# Patient Record
Sex: MALE | Race: WHITE | NOT HISPANIC OR LATINO | Employment: OTHER | URBAN - NONMETROPOLITAN AREA
[De-identification: names, ages, dates, MRNs, and addresses within clinical notes are randomized per-mention and may not be internally consistent; named-entity substitution may affect disease eponyms.]

---

## 2018-02-03 ENCOUNTER — HOSPITAL ENCOUNTER (EMERGENCY)
Facility: HOSPITAL | Age: 68
Discharge: HOME | End: 2018-02-03
Attending: EMERGENCY MEDICINE | Admitting: EMERGENCY MEDICINE

## 2018-02-03 VITALS
OXYGEN SATURATION: 98 % | WEIGHT: 160 LBS | DIASTOLIC BLOOD PRESSURE: 86 MMHG | HEIGHT: 67 IN | TEMPERATURE: 98.9 F | HEART RATE: 68 BPM | RESPIRATION RATE: 18 BRPM | BODY MASS INDEX: 25.11 KG/M2 | SYSTOLIC BLOOD PRESSURE: 127 MMHG

## 2018-02-03 DIAGNOSIS — F10.20 ALCOHOLISM (HCC): Primary | ICD-10-CM

## 2018-02-03 LAB
ALBUMIN SERPL-MCNC: 4 G/DL (ref 3.4–4.8)
ALBUMIN/GLOB SERPL: 1.5 G/DL (ref 1.5–2.5)
ALP SERPL-CCNC: 105 U/L (ref 40–129)
ALT SERPL W P-5'-P-CCNC: 87 U/L (ref 10–44)
ANION GAP SERPL CALCULATED.3IONS-SCNC: 12.9 MMOL/L (ref 3.6–11.2)
AST SERPL-CCNC: 142 U/L (ref 10–34)
BACTERIA UR QL AUTO: ABNORMAL /HPF
BASOPHILS # BLD AUTO: 0.03 10*3/MM3 (ref 0–0.3)
BASOPHILS NFR BLD AUTO: 0.5 % (ref 0–2)
BILIRUB SERPL-MCNC: 1.4 MG/DL (ref 0.2–1.8)
BILIRUB UR QL STRIP: ABNORMAL
BUN BLD-MCNC: 12 MG/DL (ref 7–21)
BUN/CREAT SERPL: 15.8 (ref 7–25)
CALCIUM SPEC-SCNC: 8.9 MG/DL (ref 7.7–10)
CHLORIDE SERPL-SCNC: 97 MMOL/L (ref 99–112)
CLARITY UR: ABNORMAL
CO2 SERPL-SCNC: 26.1 MMOL/L (ref 24.3–31.9)
COLOR UR: ABNORMAL
CREAT BLD-MCNC: 0.76 MG/DL (ref 0.43–1.29)
D-LACTATE SERPL-SCNC: 3 MMOL/L (ref 0.5–2)
DEPRECATED RDW RBC AUTO: 48.2 FL (ref 37–54)
EOSINOPHIL # BLD AUTO: 0.03 10*3/MM3 (ref 0–0.7)
EOSINOPHIL NFR BLD AUTO: 0.5 % (ref 0–7)
ERYTHROCYTE [DISTWIDTH] IN BLOOD BY AUTOMATED COUNT: 14.9 % (ref 11.5–14.5)
ETHANOL BLD-MCNC: 118 MG/DL
ETHANOL UR QL: 0.12 %
FLUAV AG NPH QL: NEGATIVE
FLUBV AG NPH QL IA: NEGATIVE
GFR SERPL CREATININE-BSD FRML MDRD: 102 ML/MIN/1.73
GLOBULIN UR ELPH-MCNC: 2.6 GM/DL
GLUCOSE BLD-MCNC: 104 MG/DL (ref 70–110)
GLUCOSE UR STRIP-MCNC: NEGATIVE MG/DL
HCT VFR BLD AUTO: 44.9 % (ref 42–52)
HGB BLD-MCNC: 15.7 G/DL (ref 14–18)
HGB UR QL STRIP.AUTO: ABNORMAL
HOLD SPECIMEN: NORMAL
HYALINE CASTS UR QL AUTO: ABNORMAL /LPF
IMM GRANULOCYTES # BLD: 0.01 10*3/MM3 (ref 0–0.03)
IMM GRANULOCYTES NFR BLD: 0.2 % (ref 0–0.5)
KETONES UR QL STRIP: ABNORMAL
LEUKOCYTE ESTERASE UR QL STRIP.AUTO: ABNORMAL
LYMPHOCYTES # BLD AUTO: 1.39 10*3/MM3 (ref 1–3)
LYMPHOCYTES NFR BLD AUTO: 25.3 % (ref 16–46)
MAGNESIUM SERPL-MCNC: 1.6 MG/DL (ref 1.7–2.6)
MCH RBC QN AUTO: 31.8 PG (ref 27–33)
MCHC RBC AUTO-ENTMCNC: 35 G/DL (ref 33–37)
MCV RBC AUTO: 90.9 FL (ref 80–94)
MONOCYTES # BLD AUTO: 0.7 10*3/MM3 (ref 0.1–0.9)
MONOCYTES NFR BLD AUTO: 12.8 % (ref 0–12)
NEUTROPHILS # BLD AUTO: 3.33 10*3/MM3 (ref 1.4–6.5)
NEUTROPHILS NFR BLD AUTO: 60.7 % (ref 40–75)
NITRITE UR QL STRIP: NEGATIVE
OSMOLALITY SERPL CALC.SUM OF ELEC: 272 MOSM/KG (ref 273–305)
PH UR STRIP.AUTO: 5.5 [PH] (ref 5–8)
PLATELET # BLD AUTO: 35 10*3/MM3 (ref 130–400)
PMV BLD AUTO: 13.2 FL (ref 6–10)
POTASSIUM BLD-SCNC: 3.5 MMOL/L (ref 3.5–5.3)
PROT SERPL-MCNC: 6.6 G/DL (ref 6–8)
PROT UR QL STRIP: ABNORMAL
RBC # BLD AUTO: 4.94 10*6/MM3 (ref 4.7–6.1)
RBC # UR: ABNORMAL /HPF
RBC MORPH BLD: NORMAL
REF LAB TEST METHOD: ABNORMAL
SMALL PLATELETS BLD QL SMEAR: NORMAL
SODIUM BLD-SCNC: 136 MMOL/L (ref 135–153)
SP GR UR STRIP: 1.02 (ref 1–1.03)
SQUAMOUS #/AREA URNS HPF: ABNORMAL /HPF
UROBILINOGEN UR QL STRIP: ABNORMAL
WBC NRBC COR # BLD: 5.49 10*3/MM3 (ref 4.5–12.5)
WBC UR QL AUTO: ABNORMAL /HPF

## 2018-02-03 PROCEDURE — 83735 ASSAY OF MAGNESIUM: CPT | Performed by: EMERGENCY MEDICINE

## 2018-02-03 PROCEDURE — 80053 COMPREHEN METABOLIC PANEL: CPT | Performed by: EMERGENCY MEDICINE

## 2018-02-03 PROCEDURE — 85025 COMPLETE CBC W/AUTO DIFF WBC: CPT | Performed by: EMERGENCY MEDICINE

## 2018-02-03 PROCEDURE — 99284 EMERGENCY DEPT VISIT MOD MDM: CPT

## 2018-02-03 PROCEDURE — 87804 INFLUENZA ASSAY W/OPTIC: CPT | Performed by: EMERGENCY MEDICINE

## 2018-02-03 PROCEDURE — 80307 DRUG TEST PRSMV CHEM ANLYZR: CPT | Performed by: EMERGENCY MEDICINE

## 2018-02-03 PROCEDURE — 96365 THER/PROPH/DIAG IV INF INIT: CPT

## 2018-02-03 PROCEDURE — 25010000002 THIAMINE PER 100 MG: Performed by: EMERGENCY MEDICINE

## 2018-02-03 PROCEDURE — 87040 BLOOD CULTURE FOR BACTERIA: CPT | Performed by: EMERGENCY MEDICINE

## 2018-02-03 PROCEDURE — 85007 BL SMEAR W/DIFF WBC COUNT: CPT | Performed by: EMERGENCY MEDICINE

## 2018-02-03 PROCEDURE — 83605 ASSAY OF LACTIC ACID: CPT | Performed by: EMERGENCY MEDICINE

## 2018-02-03 PROCEDURE — 87147 CULTURE TYPE IMMUNOLOGIC: CPT | Performed by: EMERGENCY MEDICINE

## 2018-02-03 PROCEDURE — 87077 CULTURE AEROBIC IDENTIFY: CPT | Performed by: EMERGENCY MEDICINE

## 2018-02-03 PROCEDURE — 25010000002 MAGNESIUM SULFATE PER 500 MG OF MAGNESIUM: Performed by: EMERGENCY MEDICINE

## 2018-02-03 PROCEDURE — 87186 SC STD MICRODIL/AGAR DIL: CPT | Performed by: EMERGENCY MEDICINE

## 2018-02-03 PROCEDURE — 81001 URINALYSIS AUTO W/SCOPE: CPT | Performed by: EMERGENCY MEDICINE

## 2018-02-03 RX ORDER — POTASSIUM CHLORIDE 20 MEQ/1
40 TABLET, EXTENDED RELEASE ORAL ONCE
Status: COMPLETED | OUTPATIENT
Start: 2018-02-03 | End: 2018-02-03

## 2018-02-03 RX ORDER — SODIUM CHLORIDE 0.9 % (FLUSH) 0.9 %
10 SYRINGE (ML) INJECTION AS NEEDED
Status: DISCONTINUED | OUTPATIENT
Start: 2018-02-03 | End: 2018-02-03 | Stop reason: HOSPADM

## 2018-02-03 RX ORDER — HYDROXYZINE HYDROCHLORIDE 25 MG/1
25 TABLET, FILM COATED ORAL ONCE
Status: COMPLETED | OUTPATIENT
Start: 2018-02-03 | End: 2018-02-03

## 2018-02-03 RX ADMIN — POTASSIUM CHLORIDE 40 MEQ: 1500 TABLET, EXTENDED RELEASE ORAL at 17:03

## 2018-02-03 RX ADMIN — HYDROXYZINE 25 MG: 25 TABLET, FILM COATED ORAL at 17:55

## 2018-02-03 RX ADMIN — THIAMINE HYDROCHLORIDE 1000 ML/HR: 100 INJECTION, SOLUTION INTRAMUSCULAR; INTRAVENOUS at 17:31

## 2018-02-03 NOTE — ED NOTES
Pt advised of the need for urine, given urinal and instructed to push call light when finished.      Temi Walker  02/03/18 1535

## 2018-02-03 NOTE — ED PROVIDER NOTES
Subjective   Patient is a 67 y.o. male presenting with flu symptoms.   Flu Symptoms   Presenting symptoms: fatigue and myalgias    Presenting symptoms: no fever    Severity:  Mild  Onset quality:  Gradual  Duration:  2 days  Chronicity:  New  Relieved by:  Nothing  Worsened by:  Movement  Ineffective treatments:  Certain positions  Associated symptoms: chills and nasal congestion        Review of Systems   Constitutional: Positive for chills and fatigue. Negative for fever.   HENT: Positive for congestion.    Respiratory: Negative.    Cardiovascular: Negative.  Negative for chest pain.   Gastrointestinal: Negative.  Negative for abdominal pain.   Endocrine: Negative.    Genitourinary: Negative.  Negative for dysuria.   Musculoskeletal: Positive for myalgias.   Skin: Negative.    Neurological: Negative.    Psychiatric/Behavioral: Negative.    All other systems reviewed and are negative.      History reviewed. No pertinent past medical history.    No Known Allergies    Past Surgical History:   Procedure Laterality Date   • BACK SURGERY         History reviewed. No pertinent family history.    Social History     Social History   • Marital status:      Spouse name: N/A   • Number of children: N/A   • Years of education: N/A     Social History Main Topics   • Smoking status: Current Every Day Smoker     Packs/day: 1.50   • Smokeless tobacco: None   • Alcohol use Yes   • Drug use: No   • Sexual activity: Defer     Other Topics Concern   • None     Social History Narrative   • None           Objective   Physical Exam   Constitutional: He is oriented to person, place, and time. He appears well-developed and well-nourished. No distress.   HENT:   Head: Normocephalic and atraumatic.   Right Ear: External ear normal.   Left Ear: External ear normal.   Nose: Nose normal.   Eyes: Conjunctivae and EOM are normal. Pupils are equal, round, and reactive to light.   Neck: Normal range of motion. Neck supple. No JVD present. No  tracheal deviation present.   Cardiovascular: Normal rate, regular rhythm and normal heart sounds.    No murmur heard.  Pulmonary/Chest: Effort normal and breath sounds normal. No respiratory distress. He has no wheezes.   Abdominal: Soft. Bowel sounds are normal. There is no tenderness.   Musculoskeletal: Normal range of motion. He exhibits no edema or deformity.   Neurological: He is alert and oriented to person, place, and time. No cranial nerve deficit.   Skin: Skin is warm and dry. No rash noted. He is not diaphoretic. No erythema. No pallor.   Psychiatric: He has a normal mood and affect. His behavior is normal. Thought content normal.   Nursing note and vitals reviewed.      Procedures         ED Course  ED Course                  MDM    Final diagnoses:   Alcoholism            Bang Castellon MD  02/03/18 8711

## 2018-02-09 LAB
BACTERIA SPEC AEROBE CULT: ABNORMAL
BACTERIA SPEC AEROBE CULT: ABNORMAL
GRAM STN SPEC: ABNORMAL
ISOLATED FROM: ABNORMAL

## 2019-05-06 ENCOUNTER — APPOINTMENT (OUTPATIENT)
Dept: GENERAL RADIOLOGY | Facility: HOSPITAL | Age: 69
End: 2019-05-06

## 2019-05-06 ENCOUNTER — HOSPITAL ENCOUNTER (INPATIENT)
Facility: HOSPITAL | Age: 69
LOS: 4 days | Discharge: HOME | End: 2019-05-10
Attending: EMERGENCY MEDICINE | Admitting: INTERNAL MEDICINE

## 2019-05-06 DIAGNOSIS — J18.9 PNEUMONIA OF BOTH LUNGS DUE TO INFECTIOUS ORGANISM, UNSPECIFIED PART OF LUNG: ICD-10-CM

## 2019-05-06 DIAGNOSIS — I48.91 ATRIAL FIBRILLATION WITH RVR (HCC): Primary | ICD-10-CM

## 2019-05-06 LAB
6-ACETYL MORPHINE: NEGATIVE
ACETONE BLD QL: NEGATIVE
ALBUMIN SERPL-MCNC: 3.59 G/DL (ref 3.5–5.2)
ALBUMIN/GLOB SERPL: 1.2 G/DL
ALP SERPL-CCNC: 81 U/L (ref 39–117)
ALT SERPL W P-5'-P-CCNC: 22 U/L (ref 1–41)
AMPHET+METHAMPHET UR QL: NEGATIVE
ANION GAP SERPL CALCULATED.3IONS-SCNC: 14.4 MMOL/L
APTT PPP: 33.8 SECONDS (ref 23.8–36.1)
APTT PPP: 48.7 SECONDS (ref 23.8–36.1)
AST SERPL-CCNC: 25 U/L (ref 1–40)
BARBITURATES UR QL SCN: NEGATIVE
BASOPHILS # BLD AUTO: 0.05 10*3/MM3 (ref 0–0.2)
BASOPHILS NFR BLD AUTO: 0.7 % (ref 0–1.5)
BENZODIAZ UR QL SCN: NEGATIVE
BILIRUB SERPL-MCNC: 2.3 MG/DL (ref 0.2–1.2)
BILIRUB UR QL STRIP: NEGATIVE
BUN BLD-MCNC: 17 MG/DL (ref 8–23)
BUN/CREAT SERPL: 23.6 (ref 7–25)
BUPRENORPHINE SERPL-MCNC: NEGATIVE NG/ML
CALCIUM SPEC-SCNC: 8.5 MG/DL (ref 8.6–10.5)
CANNABINOIDS SERPL QL: NEGATIVE
CHLORIDE SERPL-SCNC: 105 MMOL/L (ref 98–107)
CLARITY UR: CLEAR
CO2 SERPL-SCNC: 20.6 MMOL/L (ref 22–29)
COCAINE UR QL: NEGATIVE
COLOR UR: YELLOW
CREAT BLD-MCNC: 0.72 MG/DL (ref 0.76–1.27)
CRP SERPL-MCNC: 2.97 MG/DL (ref 0–0.5)
DEPRECATED RDW RBC AUTO: 46.1 FL (ref 37–54)
EOSINOPHIL # BLD AUTO: 0.16 10*3/MM3 (ref 0–0.4)
EOSINOPHIL NFR BLD AUTO: 2.2 % (ref 0.3–6.2)
ERYTHROCYTE [DISTWIDTH] IN BLOOD BY AUTOMATED COUNT: 14.2 % (ref 12.3–15.4)
ETHANOL BLD-MCNC: <10 MG/DL (ref 0–10)
ETHANOL UR QL: <0.01 %
GFR SERPL CREATININE-BSD FRML MDRD: 109 ML/MIN/1.73
GLOBULIN UR ELPH-MCNC: 2.9 GM/DL
GLUCOSE BLD-MCNC: 84 MG/DL (ref 65–99)
GLUCOSE UR STRIP-MCNC: NEGATIVE MG/DL
HCT VFR BLD AUTO: 46.2 % (ref 37.5–51)
HGB BLD-MCNC: 15.5 G/DL (ref 13–17.7)
HGB UR QL STRIP.AUTO: NEGATIVE
IMM GRANULOCYTES # BLD AUTO: 0.02 10*3/MM3 (ref 0–0.05)
IMM GRANULOCYTES NFR BLD AUTO: 0.3 % (ref 0–0.5)
INR PPP: 1.05 (ref 0.9–1.1)
INR PPP: 1.12 (ref 0.9–1.1)
KETONES UR QL STRIP: ABNORMAL
L PNEUMO1 AG UR QL IA: NEGATIVE
LEUKOCYTE ESTERASE UR QL STRIP.AUTO: NEGATIVE
LYMPHOCYTES # BLD AUTO: 1.75 10*3/MM3 (ref 0.7–3.1)
LYMPHOCYTES NFR BLD AUTO: 23.9 % (ref 19.6–45.3)
MAGNESIUM SERPL-MCNC: 1.7 MG/DL (ref 1.6–2.4)
MCH RBC QN AUTO: 30.8 PG (ref 26.6–33)
MCHC RBC AUTO-ENTMCNC: 33.5 G/DL (ref 31.5–35.7)
MCV RBC AUTO: 91.8 FL (ref 79–97)
METHADONE UR QL SCN: NEGATIVE
MONOCYTES # BLD AUTO: 1.05 10*3/MM3 (ref 0.1–0.9)
MONOCYTES NFR BLD AUTO: 14.3 % (ref 5–12)
NEUTROPHILS # BLD AUTO: 4.29 10*3/MM3 (ref 1.7–7)
NEUTROPHILS NFR BLD AUTO: 58.6 % (ref 42.7–76)
NITRITE UR QL STRIP: NEGATIVE
NT-PROBNP SERPL-MCNC: 4965 PG/ML (ref 5–900)
OPIATES UR QL: NEGATIVE
OXYCODONE UR QL SCN: NEGATIVE
PCP UR QL SCN: NEGATIVE
PH UR STRIP.AUTO: <=5 [PH] (ref 5–8)
PLATELET # BLD AUTO: 121 10*3/MM3 (ref 140–450)
PMV BLD AUTO: 12.2 FL (ref 6–12)
POTASSIUM BLD-SCNC: 4.1 MMOL/L (ref 3.5–5.2)
PROT SERPL-MCNC: 6.5 G/DL (ref 6–8.5)
PROT UR QL STRIP: NEGATIVE
PROTHROMBIN TIME: 14.2 SECONDS (ref 11–15.4)
PROTHROMBIN TIME: 15 SECONDS (ref 11–15.4)
RBC # BLD AUTO: 5.03 10*6/MM3 (ref 4.14–5.8)
SODIUM BLD-SCNC: 140 MMOL/L (ref 136–145)
SP GR UR STRIP: 1.03 (ref 1–1.03)
TROPONIN T SERPL-MCNC: <0.01 NG/ML (ref 0–0.03)
TROPONIN T SERPL-MCNC: <0.01 NG/ML (ref 0–0.03)
TSH SERPL DL<=0.05 MIU/L-ACNC: 3.15 MIU/ML (ref 0.27–4.2)
UROBILINOGEN UR QL STRIP: ABNORMAL
WBC NRBC COR # BLD: 7.32 10*3/MM3 (ref 3.4–10.8)

## 2019-05-06 PROCEDURE — 86738 MYCOPLASMA ANTIBODY: CPT | Performed by: NURSE PRACTITIONER

## 2019-05-06 PROCEDURE — 80307 DRUG TEST PRSMV CHEM ANLYZR: CPT | Performed by: PHYSICIAN ASSISTANT

## 2019-05-06 PROCEDURE — 25010000002 CEFTRIAXONE: Performed by: PHYSICIAN ASSISTANT

## 2019-05-06 PROCEDURE — 80053 COMPREHEN METABOLIC PANEL: CPT | Performed by: PHYSICIAN ASSISTANT

## 2019-05-06 PROCEDURE — 86140 C-REACTIVE PROTEIN: CPT | Performed by: NURSE PRACTITIONER

## 2019-05-06 PROCEDURE — 25010000002 AZITHROMYCIN: Performed by: PHYSICIAN ASSISTANT

## 2019-05-06 PROCEDURE — 83735 ASSAY OF MAGNESIUM: CPT | Performed by: INTERNAL MEDICINE

## 2019-05-06 PROCEDURE — 85610 PROTHROMBIN TIME: CPT | Performed by: INTERNAL MEDICINE

## 2019-05-06 PROCEDURE — 93005 ELECTROCARDIOGRAM TRACING: CPT | Performed by: PHYSICIAN ASSISTANT

## 2019-05-06 PROCEDURE — 80074 ACUTE HEPATITIS PANEL: CPT | Performed by: NURSE PRACTITIONER

## 2019-05-06 PROCEDURE — 87040 BLOOD CULTURE FOR BACTERIA: CPT | Performed by: PHYSICIAN ASSISTANT

## 2019-05-06 PROCEDURE — 85025 COMPLETE CBC W/AUTO DIFF WBC: CPT | Performed by: PHYSICIAN ASSISTANT

## 2019-05-06 PROCEDURE — 83880 ASSAY OF NATRIURETIC PEPTIDE: CPT | Performed by: PHYSICIAN ASSISTANT

## 2019-05-06 PROCEDURE — 71046 X-RAY EXAM CHEST 2 VIEWS: CPT | Performed by: RADIOLOGY

## 2019-05-06 PROCEDURE — 84443 ASSAY THYROID STIM HORMONE: CPT | Performed by: PHYSICIAN ASSISTANT

## 2019-05-06 PROCEDURE — 85730 THROMBOPLASTIN TIME PARTIAL: CPT | Performed by: INTERNAL MEDICINE

## 2019-05-06 PROCEDURE — 36415 COLL VENOUS BLD VENIPUNCTURE: CPT

## 2019-05-06 PROCEDURE — 85730 THROMBOPLASTIN TIME PARTIAL: CPT | Performed by: PHYSICIAN ASSISTANT

## 2019-05-06 PROCEDURE — 87899 AGENT NOS ASSAY W/OPTIC: CPT | Performed by: NURSE PRACTITIONER

## 2019-05-06 PROCEDURE — 93010 ELECTROCARDIOGRAM REPORT: CPT | Performed by: INTERNAL MEDICINE

## 2019-05-06 PROCEDURE — 85610 PROTHROMBIN TIME: CPT | Performed by: PHYSICIAN ASSISTANT

## 2019-05-06 PROCEDURE — 71046 X-RAY EXAM CHEST 2 VIEWS: CPT

## 2019-05-06 PROCEDURE — 99285 EMERGENCY DEPT VISIT HI MDM: CPT

## 2019-05-06 PROCEDURE — 25010000002 HEPARIN (PORCINE) PER 1000 UNITS: Performed by: PHYSICIAN ASSISTANT

## 2019-05-06 PROCEDURE — 84484 ASSAY OF TROPONIN QUANT: CPT | Performed by: PHYSICIAN ASSISTANT

## 2019-05-06 PROCEDURE — 81003 URINALYSIS AUTO W/O SCOPE: CPT | Performed by: PHYSICIAN ASSISTANT

## 2019-05-06 PROCEDURE — 99223 1ST HOSP IP/OBS HIGH 75: CPT | Performed by: INTERNAL MEDICINE

## 2019-05-06 PROCEDURE — 82009 KETONE BODYS QUAL: CPT | Performed by: NURSE PRACTITIONER

## 2019-05-06 RX ORDER — HEPARIN SODIUM 5000 [USP'U]/ML
60 INJECTION, SOLUTION INTRAVENOUS; SUBCUTANEOUS ONCE
Status: DISCONTINUED | OUTPATIENT
Start: 2019-05-07 | End: 2019-05-06 | Stop reason: SDUPTHER

## 2019-05-06 RX ORDER — HEPARIN SODIUM 10000 [USP'U]/100ML
12 INJECTION, SOLUTION INTRAVENOUS
Status: DISCONTINUED | OUTPATIENT
Start: 2019-05-07 | End: 2019-05-09

## 2019-05-06 RX ORDER — MAGNESIUM SULFATE HEPTAHYDRATE 40 MG/ML
2 INJECTION, SOLUTION INTRAVENOUS AS NEEDED
Status: DISCONTINUED | OUTPATIENT
Start: 2019-05-06 | End: 2019-05-10 | Stop reason: HOSPADM

## 2019-05-06 RX ORDER — DILTIAZEM HYDROCHLORIDE 5 MG/ML
10 INJECTION INTRAVENOUS ONCE
Status: COMPLETED | OUTPATIENT
Start: 2019-05-06 | End: 2019-05-06

## 2019-05-06 RX ORDER — SODIUM CHLORIDE 0.9 % (FLUSH) 0.9 %
3-10 SYRINGE (ML) INJECTION AS NEEDED
Status: DISCONTINUED | OUTPATIENT
Start: 2019-05-06 | End: 2019-05-10 | Stop reason: HOSPADM

## 2019-05-06 RX ORDER — HEPARIN SODIUM 5000 [USP'U]/ML
60 INJECTION, SOLUTION INTRAVENOUS; SUBCUTANEOUS AS NEEDED
Status: DISCONTINUED | OUTPATIENT
Start: 2019-05-06 | End: 2019-05-09

## 2019-05-06 RX ORDER — POTASSIUM CHLORIDE 7.45 MG/ML
10 INJECTION INTRAVENOUS
Status: DISCONTINUED | OUTPATIENT
Start: 2019-05-06 | End: 2019-05-10 | Stop reason: HOSPADM

## 2019-05-06 RX ORDER — GUAIFENESIN 600 MG/1
600 TABLET, EXTENDED RELEASE ORAL EVERY 12 HOURS SCHEDULED
Status: DISCONTINUED | OUTPATIENT
Start: 2019-05-07 | End: 2019-05-10 | Stop reason: HOSPADM

## 2019-05-06 RX ORDER — MAGNESIUM SULFATE HEPTAHYDRATE 40 MG/ML
4 INJECTION, SOLUTION INTRAVENOUS AS NEEDED
Status: DISCONTINUED | OUTPATIENT
Start: 2019-05-06 | End: 2019-05-10 | Stop reason: HOSPADM

## 2019-05-06 RX ORDER — METOPROLOL SUCCINATE 25 MG/1
25 TABLET, EXTENDED RELEASE ORAL
Status: DISCONTINUED | OUTPATIENT
Start: 2019-05-07 | End: 2019-05-07

## 2019-05-06 RX ORDER — POTASSIUM CHLORIDE 1.5 G/1.77G
40 POWDER, FOR SOLUTION ORAL AS NEEDED
Status: DISCONTINUED | OUTPATIENT
Start: 2019-05-06 | End: 2019-05-10 | Stop reason: HOSPADM

## 2019-05-06 RX ORDER — POTASSIUM CHLORIDE 20 MEQ/1
40 TABLET, EXTENDED RELEASE ORAL AS NEEDED
Status: DISCONTINUED | OUTPATIENT
Start: 2019-05-06 | End: 2019-05-10 | Stop reason: HOSPADM

## 2019-05-06 RX ORDER — HEPARIN SODIUM 5000 [USP'U]/ML
70 INJECTION, SOLUTION INTRAVENOUS; SUBCUTANEOUS ONCE
Status: COMPLETED | OUTPATIENT
Start: 2019-05-06 | End: 2019-05-06

## 2019-05-06 RX ORDER — SODIUM CHLORIDE 0.9 % (FLUSH) 0.9 %
3 SYRINGE (ML) INJECTION EVERY 12 HOURS SCHEDULED
Status: DISCONTINUED | OUTPATIENT
Start: 2019-05-07 | End: 2019-05-10 | Stop reason: HOSPADM

## 2019-05-06 RX ORDER — NITROGLYCERIN 0.4 MG/1
0.4 TABLET SUBLINGUAL
Status: DISCONTINUED | OUTPATIENT
Start: 2019-05-06 | End: 2019-05-10 | Stop reason: HOSPADM

## 2019-05-06 RX ORDER — HEPARIN SODIUM 10000 [USP'U]/100ML
12 INJECTION, SOLUTION INTRAVENOUS
Status: DISCONTINUED | OUTPATIENT
Start: 2019-05-06 | End: 2019-05-06

## 2019-05-06 RX ORDER — DILTIAZEM HCL/D5W 125 MG/125
5-15 PLASTIC BAG, INJECTION (ML) INTRAVENOUS
Status: DISCONTINUED | OUTPATIENT
Start: 2019-05-06 | End: 2019-05-06

## 2019-05-06 RX ORDER — HEPARIN SODIUM 5000 [USP'U]/ML
30 INJECTION, SOLUTION INTRAVENOUS; SUBCUTANEOUS AS NEEDED
Status: DISCONTINUED | OUTPATIENT
Start: 2019-05-06 | End: 2019-05-09

## 2019-05-06 RX ADMIN — AZITHROMYCIN MONOHYDRATE 500 MG: 500 INJECTION, POWDER, LYOPHILIZED, FOR SOLUTION INTRAVENOUS at 22:28

## 2019-05-06 RX ADMIN — HEPARIN SODIUM 5000 UNITS: 5000 INJECTION INTRAVENOUS; SUBCUTANEOUS at 18:01

## 2019-05-06 RX ADMIN — DILTIAZEM HYDROCHLORIDE 10 MG: 5 INJECTION INTRAVENOUS at 19:13

## 2019-05-06 RX ADMIN — HEPARIN SODIUM 12 UNITS/KG/HR: 10000 INJECTION, SOLUTION INTRAVENOUS at 18:02

## 2019-05-06 RX ADMIN — CEFTRIAXONE 1 G: 1 INJECTION, POWDER, FOR SOLUTION INTRAMUSCULAR; INTRAVENOUS at 21:07

## 2019-05-06 RX ADMIN — DILTIAZEM HYDROCHLORIDE 10 MG: 5 INJECTION INTRAVENOUS at 18:40

## 2019-05-06 NOTE — ED PROVIDER NOTES
Subjective   Patient has been having palpitations for 4 months, increased shortness of breath, and palpitations.  Patient states he is not having any chest pain.  Went to his family doctor today and they called an ambulance and had him come here.  No recent cardiac work-up no cardiac history.  Patient states he has not had any medical care in the last several years.        History provided by:  Patient   used: No    Palpitations   Palpitations quality:  Irregular  Onset quality:  Gradual  Duration:  12 weeks  Timing:  Intermittent  Progression:  Worsening  Chronicity:  New  Context: nicotine    Relieved by:  None tried  Worsened by:  Nothing  Ineffective treatments:  None tried  Associated symptoms: shortness of breath    Associated symptoms: no chest pain, no cough, no nausea and no vomiting    Risk factors: no hx of atrial fibrillation        Review of Systems   Constitutional: Negative for chills and fever.   HENT: Negative for congestion, ear pain and sore throat.    Respiratory: Positive for shortness of breath. Negative for cough and wheezing.    Cardiovascular: Positive for palpitations and leg swelling. Negative for chest pain.   Gastrointestinal: Negative for diarrhea, nausea and vomiting.   Genitourinary: Negative for dysuria and flank pain.   Skin: Negative for rash.   Neurological: Negative for headaches.   Psychiatric/Behavioral: The patient is not nervous/anxious.    All other systems reviewed and are negative.      History reviewed. No pertinent past medical history.    No Known Allergies    Past Surgical History:   Procedure Laterality Date   • BACK SURGERY         History reviewed. No pertinent family history.    Social History     Socioeconomic History   • Marital status:      Spouse name: Not on file   • Number of children: Not on file   • Years of education: Not on file   • Highest education level: Not on file   Tobacco Use   • Smoking status: Current Every Day Smoker      Packs/day: 1.50   • Smokeless tobacco: Never Used   Substance and Sexual Activity   • Alcohol use: Yes   • Drug use: No   • Sexual activity: Defer           Objective   Physical Exam   Constitutional: He is oriented to person, place, and time. He appears well-developed and well-nourished.   HENT:   Head: Normocephalic.   Mouth/Throat: Oropharynx is clear and moist.   Neck: Neck supple.   Cardiovascular: An irregularly irregular rhythm present. Tachycardia present.   Pulmonary/Chest: Effort normal and breath sounds normal.   Abdominal: Soft. Bowel sounds are normal. There is no tenderness.   Musculoskeletal: Normal range of motion.   Neurological: He is alert and oriented to person, place, and time.   Skin: Skin is warm and dry.   Psychiatric: He has a normal mood and affect. His behavior is normal. Judgment and thought content normal.   Nursing note and vitals reviewed.      Procedures           ED Course                  MDM      Final diagnoses:   None            Jeanne Castellon PA  05/06/19 1949

## 2019-05-07 ENCOUNTER — APPOINTMENT (OUTPATIENT)
Dept: CARDIOLOGY | Facility: HOSPITAL | Age: 69
End: 2019-05-07

## 2019-05-07 ENCOUNTER — APPOINTMENT (OUTPATIENT)
Dept: ULTRASOUND IMAGING | Facility: HOSPITAL | Age: 69
End: 2019-05-07

## 2019-05-07 LAB
ALBUMIN SERPL-MCNC: 3.27 G/DL (ref 3.5–5.2)
ALBUMIN/GLOB SERPL: 1.3 G/DL
ALP SERPL-CCNC: 77 U/L (ref 39–117)
ALT SERPL W P-5'-P-CCNC: 22 U/L (ref 1–41)
ANION GAP SERPL CALCULATED.3IONS-SCNC: 14.5 MMOL/L
APTT PPP: 60.3 SECONDS (ref 23.8–36.1)
APTT PPP: 72.8 SECONDS (ref 23.8–36.1)
AST SERPL-CCNC: 22 U/L (ref 1–40)
BASOPHILS # BLD AUTO: 0.04 10*3/MM3 (ref 0–0.2)
BASOPHILS NFR BLD AUTO: 0.5 % (ref 0–1.5)
BH CV ECHO MEAS - % IVS THICK: 66.4 %
BH CV ECHO MEAS - % LVPW THICK: 33.3 %
BH CV ECHO MEAS - ACS: 2.3 CM
BH CV ECHO MEAS - AI DEC SLOPE: 238.7 CM/SEC^2
BH CV ECHO MEAS - AI MAX PG: 53.5 MMHG
BH CV ECHO MEAS - AI MAX VEL: 365.8 CM/SEC
BH CV ECHO MEAS - AI P1/2T: 448.8 MSEC
BH CV ECHO MEAS - AO MAX PG: 3.9 MMHG
BH CV ECHO MEAS - AO MEAN PG: 2.4 MMHG
BH CV ECHO MEAS - AO ROOT AREA (BSA CORRECTED): 1.6
BH CV ECHO MEAS - AO ROOT AREA: 7 CM^2
BH CV ECHO MEAS - AO ROOT DIAM: 3 CM
BH CV ECHO MEAS - AO V2 MAX: 99.1 CM/SEC
BH CV ECHO MEAS - AO V2 MEAN: 71.6 CM/SEC
BH CV ECHO MEAS - AO V2 VTI: 17.1 CM
BH CV ECHO MEAS - BSA(HAYCOCK): 1.9 M^2
BH CV ECHO MEAS - BSA: 1.9 M^2
BH CV ECHO MEAS - BZI_BMI: 26 KILOGRAMS/M^2
BH CV ECHO MEAS - BZI_METRIC_HEIGHT: 170.2 CM
BH CV ECHO MEAS - BZI_METRIC_WEIGHT: 75.3 KG
BH CV ECHO MEAS - EDV(CUBED): 227.5 ML
BH CV ECHO MEAS - EDV(MOD-SP4): 100 ML
BH CV ECHO MEAS - EDV(TEICH): 187.3 ML
BH CV ECHO MEAS - EF(CUBED): 28.6 %
BH CV ECHO MEAS - EF(MOD-SP4): 35 %
BH CV ECHO MEAS - EF(TEICH): 22.7 %
BH CV ECHO MEAS - ESV(CUBED): 162.4 ML
BH CV ECHO MEAS - ESV(MOD-SP4): 65 ML
BH CV ECHO MEAS - ESV(TEICH): 144.7 ML
BH CV ECHO MEAS - FS: 10.6 %
BH CV ECHO MEAS - IVS/LVPW: 0.69
BH CV ECHO MEAS - IVSD: 0.68 CM
BH CV ECHO MEAS - IVSS: 1.1 CM
BH CV ECHO MEAS - LA DIMENSION: 3.8 CM
BH CV ECHO MEAS - LA/AO: 1.3
BH CV ECHO MEAS - LV DIASTOLIC VOL/BSA (35-75): 53.5 ML/M^2
BH CV ECHO MEAS - LV MASS(C)D: 200.2 GRAMS
BH CV ECHO MEAS - LV MASS(C)DI: 107.1 GRAMS/M^2
BH CV ECHO MEAS - LV MASS(C)S: 273.6 GRAMS
BH CV ECHO MEAS - LV MASS(C)SI: 146.5 GRAMS/M^2
BH CV ECHO MEAS - LV SYSTOLIC VOL/BSA (12-30): 34.8 ML/M^2
BH CV ECHO MEAS - LVIDD: 6.1 CM
BH CV ECHO MEAS - LVIDS: 5.5 CM
BH CV ECHO MEAS - LVLD AP4: 7.1 CM
BH CV ECHO MEAS - LVLS AP4: 6.7 CM
BH CV ECHO MEAS - LVOT AREA (M): 3.1 CM^2
BH CV ECHO MEAS - LVOT AREA: 3.3 CM^2
BH CV ECHO MEAS - LVOT DIAM: 2 CM
BH CV ECHO MEAS - LVPWD: 0.98 CM
BH CV ECHO MEAS - LVPWS: 1.3 CM
BH CV ECHO MEAS - MV A MAX VEL: 26.2 CM/SEC
BH CV ECHO MEAS - MV E MAX VEL: 97.7 CM/SEC
BH CV ECHO MEAS - MV E/A: 3.7
BH CV ECHO MEAS - PA ACC SLOPE: 1328 CM/SEC^2
BH CV ECHO MEAS - PA ACC TIME: 0.06 SEC
BH CV ECHO MEAS - PA PR(ACCEL): 50.5 MMHG
BH CV ECHO MEAS - RAP SYSTOLE: 10 MMHG
BH CV ECHO MEAS - RVSP: 43.4 MMHG
BH CV ECHO MEAS - SI(AO): 63.8 ML/M^2
BH CV ECHO MEAS - SI(CUBED): 34.9 ML/M^2
BH CV ECHO MEAS - SI(MOD-SP4): 18.7 ML/M^2
BH CV ECHO MEAS - SI(TEICH): 22.8 ML/M^2
BH CV ECHO MEAS - SV(AO): 119.2 ML
BH CV ECHO MEAS - SV(CUBED): 65.2 ML
BH CV ECHO MEAS - SV(MOD-SP4): 35 ML
BH CV ECHO MEAS - SV(TEICH): 42.6 ML
BH CV ECHO MEAS - TR MAX VEL: 288.8 CM/SEC
BILIRUB SERPL-MCNC: 1.8 MG/DL (ref 0.2–1.2)
BUN BLD-MCNC: 18 MG/DL (ref 8–23)
BUN/CREAT SERPL: 24 (ref 7–25)
CALCIUM SPEC-SCNC: 8.3 MG/DL (ref 8.6–10.5)
CHLORIDE SERPL-SCNC: 109 MMOL/L (ref 98–107)
CO2 SERPL-SCNC: 19.5 MMOL/L (ref 22–29)
CREAT BLD-MCNC: 0.75 MG/DL (ref 0.76–1.27)
DEPRECATED RDW RBC AUTO: 46.6 FL (ref 37–54)
EOSINOPHIL # BLD AUTO: 0.16 10*3/MM3 (ref 0–0.4)
EOSINOPHIL NFR BLD AUTO: 2.1 % (ref 0.3–6.2)
ERYTHROCYTE [DISTWIDTH] IN BLOOD BY AUTOMATED COUNT: 14.4 % (ref 12.3–15.4)
GFR SERPL CREATININE-BSD FRML MDRD: 104 ML/MIN/1.73
GLOBULIN UR ELPH-MCNC: 2.5 GM/DL
GLUCOSE BLD-MCNC: 94 MG/DL (ref 65–99)
HAV IGM SERPL QL IA: NORMAL
HBV CORE IGM SERPL QL IA: NORMAL
HBV SURFACE AG SERPL QL IA: NORMAL
HCT VFR BLD AUTO: 46.1 % (ref 37.5–51)
HCV AB SER DONR QL: NORMAL
HGB BLD-MCNC: 15.3 G/DL (ref 13–17.7)
IMM GRANULOCYTES # BLD AUTO: 0.03 10*3/MM3 (ref 0–0.05)
IMM GRANULOCYTES NFR BLD AUTO: 0.4 % (ref 0–0.5)
INR PPP: 1.13 (ref 0.9–1.1)
LYMPHOCYTES # BLD AUTO: 1.04 10*3/MM3 (ref 0.7–3.1)
LYMPHOCYTES NFR BLD AUTO: 13.3 % (ref 19.6–45.3)
M PNEUMO IGM SER QL: NEGATIVE
MAGNESIUM SERPL-MCNC: 1.7 MG/DL (ref 1.6–2.4)
MAXIMAL PREDICTED HEART RATE: 152 BPM
MCH RBC QN AUTO: 30.6 PG (ref 26.6–33)
MCHC RBC AUTO-ENTMCNC: 33.2 G/DL (ref 31.5–35.7)
MCV RBC AUTO: 92.2 FL (ref 79–97)
MONOCYTES # BLD AUTO: 0.94 10*3/MM3 (ref 0.1–0.9)
MONOCYTES NFR BLD AUTO: 12.1 % (ref 5–12)
NEUTROPHILS # BLD AUTO: 5.59 10*3/MM3 (ref 1.7–7)
NEUTROPHILS NFR BLD AUTO: 71.6 % (ref 42.7–76)
PHOSPHATE SERPL-MCNC: 3.6 MG/DL (ref 2.5–4.5)
PLATELET # BLD AUTO: 111 10*3/MM3 (ref 140–450)
PMV BLD AUTO: 12.6 FL (ref 6–12)
POTASSIUM BLD-SCNC: 3.9 MMOL/L (ref 3.5–5.2)
PROT SERPL-MCNC: 5.8 G/DL (ref 6–8.5)
PROTHROMBIN TIME: 15.1 SECONDS (ref 11–15.4)
RBC # BLD AUTO: 5 10*6/MM3 (ref 4.14–5.8)
S PNEUM AG SPEC QL LA: NEGATIVE
SODIUM BLD-SCNC: 143 MMOL/L (ref 136–145)
STRESS TARGET HR: 129 BPM
TSH SERPL DL<=0.05 MIU/L-ACNC: 4.83 MIU/ML (ref 0.27–4.2)
WBC NRBC COR # BLD: 7.8 10*3/MM3 (ref 3.4–10.8)

## 2019-05-07 PROCEDURE — 76705 ECHO EXAM OF ABDOMEN: CPT

## 2019-05-07 PROCEDURE — 94640 AIRWAY INHALATION TREATMENT: CPT

## 2019-05-07 PROCEDURE — 99233 SBSQ HOSP IP/OBS HIGH 50: CPT | Performed by: INTERNAL MEDICINE

## 2019-05-07 PROCEDURE — 93010 ELECTROCARDIOGRAM REPORT: CPT | Performed by: INTERNAL MEDICINE

## 2019-05-07 PROCEDURE — 93005 ELECTROCARDIOGRAM TRACING: CPT | Performed by: INTERNAL MEDICINE

## 2019-05-07 PROCEDURE — 93306 TTE W/DOPPLER COMPLETE: CPT | Performed by: INTERNAL MEDICINE

## 2019-05-07 PROCEDURE — 94799 UNLISTED PULMONARY SVC/PX: CPT

## 2019-05-07 PROCEDURE — 87899 AGENT NOS ASSAY W/OPTIC: CPT | Performed by: NURSE PRACTITIONER

## 2019-05-07 PROCEDURE — 85025 COMPLETE CBC W/AUTO DIFF WBC: CPT | Performed by: INTERNAL MEDICINE

## 2019-05-07 PROCEDURE — 84100 ASSAY OF PHOSPHORUS: CPT | Performed by: INTERNAL MEDICINE

## 2019-05-07 PROCEDURE — 99222 1ST HOSP IP/OBS MODERATE 55: CPT | Performed by: INTERNAL MEDICINE

## 2019-05-07 PROCEDURE — 76705 ECHO EXAM OF ABDOMEN: CPT | Performed by: RADIOLOGY

## 2019-05-07 PROCEDURE — 25010000002 HEPARIN (PORCINE) PER 1000 UNITS: Performed by: INTERNAL MEDICINE

## 2019-05-07 PROCEDURE — 92610 EVALUATE SWALLOWING FUNCTION: CPT

## 2019-05-07 PROCEDURE — 93005 ELECTROCARDIOGRAM TRACING: CPT | Performed by: NURSE PRACTITIONER

## 2019-05-07 PROCEDURE — 85610 PROTHROMBIN TIME: CPT | Performed by: INTERNAL MEDICINE

## 2019-05-07 PROCEDURE — 85730 THROMBOPLASTIN TIME PARTIAL: CPT | Performed by: INTERNAL MEDICINE

## 2019-05-07 PROCEDURE — 83735 ASSAY OF MAGNESIUM: CPT | Performed by: INTERNAL MEDICINE

## 2019-05-07 PROCEDURE — 25010000002 MAGNESIUM SULFATE IN D5W 1G/100ML (PREMIX) 1-5 GM/100ML-% SOLUTION: Performed by: INTERNAL MEDICINE

## 2019-05-07 PROCEDURE — 93306 TTE W/DOPPLER COMPLETE: CPT

## 2019-05-07 PROCEDURE — 84443 ASSAY THYROID STIM HORMONE: CPT | Performed by: INTERNAL MEDICINE

## 2019-05-07 PROCEDURE — 80053 COMPREHEN METABOLIC PANEL: CPT | Performed by: INTERNAL MEDICINE

## 2019-05-07 RX ORDER — ACETAMINOPHEN 325 MG/1
650 TABLET ORAL EVERY 6 HOURS PRN
Status: DISCONTINUED | OUTPATIENT
Start: 2019-05-07 | End: 2019-05-10 | Stop reason: HOSPADM

## 2019-05-07 RX ORDER — NICOTINE 21 MG/24HR
1 PATCH, TRANSDERMAL 24 HOURS TRANSDERMAL
Status: DISCONTINUED | OUTPATIENT
Start: 2019-05-07 | End: 2019-05-10 | Stop reason: HOSPADM

## 2019-05-07 RX ORDER — MAGNESIUM SULFATE 1 G/100ML
1 INJECTION INTRAVENOUS
Status: COMPLETED | OUTPATIENT
Start: 2019-05-07 | End: 2019-05-07

## 2019-05-07 RX ORDER — AMOXICILLIN AND CLAVULANATE POTASSIUM 875; 125 MG/1; MG/1
1 TABLET, FILM COATED ORAL EVERY 12 HOURS SCHEDULED
Status: DISCONTINUED | OUTPATIENT
Start: 2019-05-07 | End: 2019-05-10 | Stop reason: HOSPADM

## 2019-05-07 RX ADMIN — AMOXICILLIN AND CLAVULANATE POTASSIUM 1 TABLET: 875; 125 TABLET, FILM COATED ORAL at 20:23

## 2019-05-07 RX ADMIN — ACETAMINOPHEN 650 MG: 325 TABLET, FILM COATED ORAL at 17:45

## 2019-05-07 RX ADMIN — IPRATROPIUM BROMIDE 0.5 MG: 0.5 SOLUTION RESPIRATORY (INHALATION) at 12:55

## 2019-05-07 RX ADMIN — IPRATROPIUM BROMIDE 0.5 MG: 0.5 SOLUTION RESPIRATORY (INHALATION) at 18:55

## 2019-05-07 RX ADMIN — METOPROLOL SUCCINATE 25 MG: 25 TABLET, EXTENDED RELEASE ORAL at 01:52

## 2019-05-07 RX ADMIN — METRONIDAZOLE 500 MG: 500 INJECTION, SOLUTION INTRAVENOUS at 01:53

## 2019-05-07 RX ADMIN — MAGNESIUM SULFATE IN DEXTROSE 1 G: 10 INJECTION, SOLUTION INTRAVENOUS at 09:54

## 2019-05-07 RX ADMIN — HEPARIN SODIUM 2100 UNITS: 5000 INJECTION INTRAVENOUS; SUBCUTANEOUS at 00:20

## 2019-05-07 RX ADMIN — MAGNESIUM SULFATE IN DEXTROSE 1 G: 10 INJECTION, SOLUTION INTRAVENOUS at 15:21

## 2019-05-07 RX ADMIN — IPRATROPIUM BROMIDE 0.5 MG: 0.5 SOLUTION RESPIRATORY (INHALATION) at 00:28

## 2019-05-07 RX ADMIN — METOPROLOL TARTRATE 25 MG: 25 TABLET, FILM COATED ORAL at 11:57

## 2019-05-07 RX ADMIN — GUAIFENESIN 600 MG: 600 TABLET, EXTENDED RELEASE ORAL at 07:11

## 2019-05-07 RX ADMIN — MAGNESIUM SULFATE IN DEXTROSE 1 G: 10 INJECTION, SOLUTION INTRAVENOUS at 13:33

## 2019-05-07 RX ADMIN — METRONIDAZOLE 500 MG: 500 INJECTION, SOLUTION INTRAVENOUS at 07:11

## 2019-05-07 RX ADMIN — DOXYCYCLINE 100 MG: 100 INJECTION, POWDER, LYOPHILIZED, FOR SOLUTION INTRAVENOUS at 01:53

## 2019-05-07 RX ADMIN — SODIUM CHLORIDE, PRESERVATIVE FREE 3 ML: 5 INJECTION INTRAVENOUS at 20:23

## 2019-05-07 RX ADMIN — IPRATROPIUM BROMIDE 0.5 MG: 0.5 SOLUTION RESPIRATORY (INHALATION) at 06:28

## 2019-05-07 RX ADMIN — MAGNESIUM SULFATE IN DEXTROSE 1 G: 10 INJECTION, SOLUTION INTRAVENOUS at 11:40

## 2019-05-07 RX ADMIN — HEPARIN SODIUM 14.82 UNITS/KG/HR: 10000 INJECTION, SOLUTION INTRAVENOUS at 00:19

## 2019-05-07 RX ADMIN — GUAIFENESIN 600 MG: 600 TABLET, EXTENDED RELEASE ORAL at 20:23

## 2019-05-07 RX ADMIN — NICOTINE 1 PATCH: 21 PATCH, EXTENDED RELEASE TRANSDERMAL at 01:52

## 2019-05-07 RX ADMIN — HEPARIN SODIUM 14.83 UNITS/KG/HR: 10000 INJECTION, SOLUTION INTRAVENOUS at 22:26

## 2019-05-07 RX ADMIN — GUAIFENESIN 600 MG: 600 TABLET, EXTENDED RELEASE ORAL at 01:52

## 2019-05-07 RX ADMIN — METOPROLOL TARTRATE 25 MG: 25 TABLET, FILM COATED ORAL at 20:23

## 2019-05-07 NOTE — ED NOTES
Compatibility of Heparin and ceftriaxone verified on [employer]      Jace Lin, RN  05/06/19 0079

## 2019-05-07 NOTE — ED NOTES
Azithromycin and heparin compatibility verified on [employer]     Jace Lin, RN  05/06/19 3587

## 2019-05-07 NOTE — PLAN OF CARE
Problem: Fall Risk (Adult)  Goal: Identify Related Risk Factors and Signs and Symptoms  Outcome: Ongoing (interventions implemented as appropriate)    Goal: Absence of Fall  Outcome: Ongoing (interventions implemented as appropriate)      Problem: Patient Care Overview  Goal: Plan of Care Review  Outcome: Ongoing (interventions implemented as appropriate)    Goal: Individualization and Mutuality  Outcome: Ongoing (interventions implemented as appropriate)    Goal: Discharge Needs Assessment  Outcome: Ongoing (interventions implemented as appropriate)    Goal: Interprofessional Rounds/Family Conf  Outcome: Ongoing (interventions implemented as appropriate)      Problem: Arrhythmia/Dysrhythmia (Symptomatic) (Adult)  Goal: Signs and Symptoms of Listed Potential Problems Will be Absent, Minimized or Managed (Arrhythmia/Dysrhythmia)  Outcome: Ongoing (interventions implemented as appropriate)      Problem: Cardiac Output Decreased (Adult)  Goal: Identify Related Risk Factors and Signs and Symptoms  Outcome: Ongoing (interventions implemented as appropriate)    Goal: Effective Tissue Perfusion  Outcome: Ongoing (interventions implemented as appropriate)
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
## 2019-05-07 NOTE — PLAN OF CARE
Problem: Patient Care Overview  Goal: Plan of Care Review  Outcome: Outcome(s) achieved Date Met: 05/07/19 05/07/19 1412   Coping/Psychosocial   Plan of Care Reviewed With patient   Plan of Care Review   Progress improving   OTHER   Outcome Summary Clinical dysphagia assessment w/o overt s/s aspiration. No odynophagia reported. Recommend continue least restrictive po diet regular consistency, thin liquids. No further formal SLP f/u recommended.
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
## 2019-05-07 NOTE — PROGRESS NOTES
HOSPITALIST PROGRESS NOTE    Patient Identification:  Name:  Yared Mckeon  Age:  68 y.o.  Sex:  male  :  1950  MRN:  [id number]  Visit Number:  [id number]  Primary Care Provider:  Tiera Tan APRN    Length of stay:  1     HPI: 69 yo male being seen in follow up for newly diagnosed Afib    Subjective:  The patient was seen earlier today. He is resting in bed; no acute distress. Patient does report ongoing intermittent weakness/fatigue and dyspnea. The patient also complains of intermittent chest discomfort that is hard for him to further describe.     He denies any nausea or vomiting. Denies previous known history of CAD, DM, CHF.    Present during exam: ZAY Martinez and patient's significant other    Current Hospital Meds:    ceftriaxone 1 g Intravenous Q24H   doxycycline 100 mg Intravenous Q12H   guaiFENesin 600 mg Oral Q12H   ipratropium 0.5 mg Nebulization 4x Daily - RT   metoprolol tartrate 25 mg Oral Q12H   metroNIDAZOLE 500 mg Intravenous Q8H   nicotine 1 patch Transdermal Q24H   sodium chloride 3 mL Intravenous Q12H       heparin (porcine) 12 Units/kg/hr Last Rate: 14.83 Units/kg/hr (19 0711)       Vital Signs  Temp:  [97.1 °F (36.2 °C)-98.2 °F (36.8 °C)] 98 °F (36.7 °C)  Heart Rate:  [] 112  Resp:  [18-20] 20  BP: (104-132)/() 125/67      19  1634 19  2304   Weight: 70.8 kg (156 lb) 75.5 kg (166 lb 6.4 oz)     Body mass index is 26.06 kg/m².     Physical exam:  Physical Exam   Constitutional: He is oriented to person, place, and time. He appears well-developed and well-nourished. No distress.   HENT:   Head: Normocephalic and atraumatic.   Mouth/Throat: Oropharynx is clear and moist.   Eyes: Conjunctivae and EOM are normal. Pupils are equal, round, and reactive to light.   Neck: Neck supple. No tracheal deviation present. No thyromegaly present.   Cardiovascular: Normal rate. An irregularly irregular rhythm present. Exam reveals no gallop and no friction rub.    No murmur heard.  Pulses:       Dorsalis pedis pulses are 2+ on the right side, and 2+ on the left side.        Posterior tibial pulses are 2+ on the right side, and 2+ on the left side.   Pulmonary/Chest: Breath sounds normal. No respiratory distress. He has no wheezes. He has no rales.   Abdominal: Soft. Bowel sounds are normal. He exhibits no distension. There is no tenderness. There is no guarding.   Musculoskeletal: Normal range of motion. He exhibits no tenderness.   Neurological: He is alert and oriented to person, place, and time. No cranial nerve deficit.   Skin: Skin is warm and dry. No rash noted. No erythema.   Psychiatric: He has a normal mood and affect.      Telemetry: Atrial fibrillation 100s    Results Review:  Results from last 7 days   Lab Units 05/07/19  0655 05/06/19  1701   WBC 10*3/mm3 7.80 7.32   HEMOGLOBIN g/dL 15.3 15.5   HEMATOCRIT % 46.1 46.2   PLATELETS 10*3/mm3 111* 121*     Results from last 7 days   Lab Units 05/07/19  0655 05/06/19  1701   SODIUM mmol/L 143 140   POTASSIUM mmol/L 3.9 4.1   CHLORIDE mmol/L 109* 105   CO2 mmol/L 19.5* 20.6*   BUN mg/dL 18 17   CREATININE mg/dL 0.75* 0.72*   CALCIUM mg/dL 8.3* 8.5*   GLUCOSE mg/dL 94 84     Results from last 7 days   Lab Units 05/07/19  0655 05/06/19  1701   BILIRUBIN mg/dL 1.8* 2.3*   ALK PHOS U/L 77 81   AST (SGOT) U/L 22 25   ALT (SGPT) U/L 22 22     Results from last 7 days   Lab Units 05/07/19  0655 05/06/19 1957   MAGNESIUM mg/dL 1.7 1.7     Results from last 7 days   Lab Units 05/07/19  1133 05/06/19  2324 05/06/19  1753   INR  1.13* 1.12* 1.05     Results from last 7 days   Lab Units 05/06/19  1957 05/06/19  1701   TROPONIN T ng/mL <0.010 <0.010     Echocardiogram:  · Left ventricular systolic function is severely decreased. Estimated EF appears to be in the range of 26 - 30% with severe global hypokinesia.  · The left ventricular cavity is mildly dilated.  · Left ventricular diastolic dysfunction (grade II) consistent with  pseudonormalization.  · Mildly reduced right ventricular systolic function noted.  · Left atrial cavity size is mildly dilated.  · Moderate mitral valve regurgitation is present  · Moderate tricuspid valve regurgitation is present.  · Mild to moderate aortic valve regurgitation is present.  · Mild pulmonary hypertension is present  · There is no evidence of pericardial effusion.      Assessment/Plan     -New onset atrial fibrillation, presenting with rapid ventricular response: Continue heparin infusion and rate controlling agent. Patient to be NPO at MN for DARLING and possible cardioversion.     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 Continue heparin infusion. Patient was started on metoprolol succinate; this was changed to metoprolol tartrate by Cardiology; continue to monitor closely on telemetry.    -Prolonged QTc: May be due to electrolyte abnormalities. Continue to monitor on telemetry.     -SIRS criteria; chest xray negative for pneumonia: Will plan to convert antibiotics to PO to decrease volume. Continue scheduled atrovent.     -Mild thrombocytopenia: Will monitor closely on heparin infusion.     -Tobacco abuse: Continue nicotine patch;  regarding cessation.     -Acute hypomagnesemia: Continue to supplement per protocol.     The patient is high risk due to the following diagnoses/reasons:  New onset atrial fibrillation with combined CHF.    I discussed the patients findings and my recommendations with patient and nursing staff.    Disposition  Home when medically stable.    Rose Cervantes DO  05/07/19  1:38 PM
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
## 2019-05-08 ENCOUNTER — APPOINTMENT (OUTPATIENT)
Dept: NUCLEAR MEDICINE | Facility: HOSPITAL | Age: 69
End: 2019-05-08

## 2019-05-08 ENCOUNTER — APPOINTMENT (OUTPATIENT)
Dept: CARDIOLOGY | Facility: HOSPITAL | Age: 69
End: 2019-05-08

## 2019-05-08 LAB
ALBUMIN SERPL-MCNC: 3.28 G/DL (ref 3.5–5.2)
ALBUMIN/GLOB SERPL: 1.3 G/DL
ALP SERPL-CCNC: 74 U/L (ref 39–117)
ALT SERPL W P-5'-P-CCNC: 20 U/L (ref 1–41)
ANION GAP SERPL CALCULATED.3IONS-SCNC: 10.3 MMOL/L
APTT PPP: 48 SECONDS (ref 23.8–36.1)
APTT PPP: 57.2 SECONDS (ref 23.8–36.1)
APTT PPP: 71.9 SECONDS (ref 23.8–36.1)
APTT PPP: 87 SECONDS (ref 23.8–36.1)
AST SERPL-CCNC: 19 U/L (ref 1–40)
BASOPHILS # BLD AUTO: 0.08 10*3/MM3 (ref 0–0.2)
BASOPHILS NFR BLD AUTO: 1 % (ref 0–1.5)
BH CV NUCLEAR PRIOR STUDY: 3
BH CV STRESS BP STAGE 1: NORMAL
BH CV STRESS BP STAGE 2: NORMAL
BH CV STRESS COMMENTS STAGE 1: NORMAL
BH CV STRESS COMMENTS STAGE 2: NORMAL
BH CV STRESS DOSE REGADENOSON STAGE 1: 0.4
BH CV STRESS DURATION MIN STAGE 1: 0
BH CV STRESS DURATION MIN STAGE 2: 4
BH CV STRESS DURATION SEC STAGE 1: 10
BH CV STRESS DURATION SEC STAGE 2: 0
BH CV STRESS HR STAGE 1: 115
BH CV STRESS HR STAGE 2: 119
BH CV STRESS PROTOCOL 1: NORMAL
BH CV STRESS RECOVERY BP: NORMAL MMHG
BH CV STRESS RECOVERY HR: 119 BPM
BH CV STRESS STAGE 1: 1
BH CV STRESS STAGE 2: 2
BILIRUB SERPL-MCNC: 1.6 MG/DL (ref 0.2–1.2)
BUN BLD-MCNC: 21 MG/DL (ref 8–23)
BUN/CREAT SERPL: 23.9 (ref 7–25)
CALCIUM SPEC-SCNC: 8.2 MG/DL (ref 8.6–10.5)
CHLORIDE SERPL-SCNC: 110 MMOL/L (ref 98–107)
CO2 SERPL-SCNC: 19.7 MMOL/L (ref 22–29)
CREAT BLD-MCNC: 0.88 MG/DL (ref 0.76–1.27)
CRP SERPL-MCNC: 2.44 MG/DL (ref 0–0.5)
DEPRECATED RDW RBC AUTO: 47.1 FL (ref 37–54)
EOSINOPHIL # BLD AUTO: 0.28 10*3/MM3 (ref 0–0.4)
EOSINOPHIL NFR BLD AUTO: 3.5 % (ref 0.3–6.2)
ERYTHROCYTE [DISTWIDTH] IN BLOOD BY AUTOMATED COUNT: 14.5 % (ref 12.3–15.4)
GFR SERPL CREATININE-BSD FRML MDRD: 86 ML/MIN/1.73
GLOBULIN UR ELPH-MCNC: 2.6 GM/DL
GLUCOSE BLD-MCNC: 101 MG/DL (ref 65–99)
HCT VFR BLD AUTO: 44 % (ref 37.5–51)
HGB BLD-MCNC: 14.3 G/DL (ref 13–17.7)
IMM GRANULOCYTES # BLD AUTO: 0.03 10*3/MM3 (ref 0–0.05)
IMM GRANULOCYTES NFR BLD AUTO: 0.4 % (ref 0–0.5)
LV EF NUC BP: 30 %
LYMPHOCYTES # BLD AUTO: 2.15 10*3/MM3 (ref 0.7–3.1)
LYMPHOCYTES NFR BLD AUTO: 26.9 % (ref 19.6–45.3)
MAGNESIUM SERPL-MCNC: 2.4 MG/DL (ref 1.6–2.4)
MAXIMAL PREDICTED HEART RATE: 152 BPM
MCH RBC QN AUTO: 30.4 PG (ref 26.6–33)
MCHC RBC AUTO-ENTMCNC: 32.5 G/DL (ref 31.5–35.7)
MCV RBC AUTO: 93.4 FL (ref 79–97)
MONOCYTES # BLD AUTO: 1.05 10*3/MM3 (ref 0.1–0.9)
MONOCYTES NFR BLD AUTO: 13.1 % (ref 5–12)
NEUTROPHILS # BLD AUTO: 4.4 10*3/MM3 (ref 1.7–7)
NEUTROPHILS NFR BLD AUTO: 55.1 % (ref 42.7–76)
PERCENT MAX PREDICTED HR: 75.66 %
PLATELET # BLD AUTO: 117 10*3/MM3 (ref 140–450)
PMV BLD AUTO: 12.2 FL (ref 6–12)
POTASSIUM BLD-SCNC: 4.5 MMOL/L (ref 3.5–5.2)
PROT SERPL-MCNC: 5.9 G/DL (ref 6–8.5)
RBC # BLD AUTO: 4.71 10*6/MM3 (ref 4.14–5.8)
SODIUM BLD-SCNC: 140 MMOL/L (ref 136–145)
STRESS BASELINE BP: NORMAL MMHG
STRESS BASELINE HR: 109 BPM
STRESS PERCENT HR: 89 %
STRESS POST PEAK BP: NORMAL MMHG
STRESS POST PEAK HR: 115 BPM
STRESS TARGET HR: 129 BPM
WBC NRBC COR # BLD: 7.99 10*3/MM3 (ref 3.4–10.8)

## 2019-05-08 PROCEDURE — 25010000002 REGADENOSON 0.4 MG/5ML SOLUTION: Performed by: INTERNAL MEDICINE

## 2019-05-08 PROCEDURE — 85730 THROMBOPLASTIN TIME PARTIAL: CPT | Performed by: INTERNAL MEDICINE

## 2019-05-08 PROCEDURE — 78452 HT MUSCLE IMAGE SPECT MULT: CPT

## 2019-05-08 PROCEDURE — 78452 HT MUSCLE IMAGE SPECT MULT: CPT | Performed by: INTERNAL MEDICINE

## 2019-05-08 PROCEDURE — 83735 ASSAY OF MAGNESIUM: CPT | Performed by: INTERNAL MEDICINE

## 2019-05-08 PROCEDURE — 25010000002 HEPARIN (PORCINE) PER 1000 UNITS: Performed by: INTERNAL MEDICINE

## 2019-05-08 PROCEDURE — 94799 UNLISTED PULMONARY SVC/PX: CPT

## 2019-05-08 PROCEDURE — 85025 COMPLETE CBC W/AUTO DIFF WBC: CPT | Performed by: INTERNAL MEDICINE

## 2019-05-08 PROCEDURE — 93017 CV STRESS TEST TRACING ONLY: CPT

## 2019-05-08 PROCEDURE — 80053 COMPREHEN METABOLIC PANEL: CPT | Performed by: INTERNAL MEDICINE

## 2019-05-08 PROCEDURE — 99232 SBSQ HOSP IP/OBS MODERATE 35: CPT | Performed by: INTERNAL MEDICINE

## 2019-05-08 PROCEDURE — 86140 C-REACTIVE PROTEIN: CPT | Performed by: INTERNAL MEDICINE

## 2019-05-08 PROCEDURE — 0 TECHNETIUM SESTAMIBI: Performed by: INTERNAL MEDICINE

## 2019-05-08 PROCEDURE — A9500 TC99M SESTAMIBI: HCPCS | Performed by: INTERNAL MEDICINE

## 2019-05-08 PROCEDURE — 99233 SBSQ HOSP IP/OBS HIGH 50: CPT | Performed by: INTERNAL MEDICINE

## 2019-05-08 PROCEDURE — 93018 CV STRESS TEST I&R ONLY: CPT | Performed by: INTERNAL MEDICINE

## 2019-05-08 RX ORDER — ASPIRIN 81 MG/1
81 TABLET, CHEWABLE ORAL DAILY
Status: DISCONTINUED | OUTPATIENT
Start: 2019-05-08 | End: 2019-05-10 | Stop reason: HOSPADM

## 2019-05-08 RX ADMIN — TECHNETIUM TC 99M SESTAMIBI 1 DOSE: 1 INJECTION INTRAVENOUS at 10:45

## 2019-05-08 RX ADMIN — GUAIFENESIN 600 MG: 600 TABLET, EXTENDED RELEASE ORAL at 19:38

## 2019-05-08 RX ADMIN — METOPROLOL TARTRATE 37.5 MG: 25 TABLET, FILM COATED ORAL at 19:39

## 2019-05-08 RX ADMIN — AMOXICILLIN AND CLAVULANATE POTASSIUM 1 TABLET: 875; 125 TABLET, FILM COATED ORAL at 19:39

## 2019-05-08 RX ADMIN — HEPARIN SODIUM 14.83 UNITS/KG/HR: 10000 INJECTION, SOLUTION INTRAVENOUS at 23:13

## 2019-05-08 RX ADMIN — REGADENOSON 0.4 MG: 0.08 INJECTION, SOLUTION INTRAVENOUS at 12:18

## 2019-05-08 RX ADMIN — ASPIRIN 81 MG: 81 TABLET, CHEWABLE ORAL at 21:16

## 2019-05-08 RX ADMIN — METOPROLOL TARTRATE 37.5 MG: 25 TABLET, FILM COATED ORAL at 10:38

## 2019-05-08 RX ADMIN — TECHNETIUM TC 99M SESTAMIBI 1 DOSE: 1 INJECTION INTRAVENOUS at 12:18

## 2019-05-08 NOTE — PLAN OF CARE
Problem: Fall Risk (Adult)  Goal: Identify Related Risk Factors and Signs and Symptoms  Outcome: Ongoing (interventions implemented as appropriate)    Goal: Absence of Fall  Outcome: Outcome(s) achieved Date Met: 05/08/19      Problem: Patient Care Overview  Goal: Individualization and Mutuality  Outcome: Ongoing (interventions implemented as appropriate)    Goal: Discharge Needs Assessment  Outcome: Ongoing (interventions implemented as appropriate)    Goal: Interprofessional Rounds/Family Conf  Outcome: Ongoing (interventions implemented as appropriate)      Problem: Arrhythmia/Dysrhythmia (Symptomatic) (Adult)  Goal: Signs and Symptoms of Listed Potential Problems Will be Absent, Minimized or Managed (Arrhythmia/Dysrhythmia)  Outcome: Ongoing (interventions implemented as appropriate)      Problem: Cardiac Output Decreased (Adult)  Goal: Identify Related Risk Factors and Signs and Symptoms  Outcome: Outcome(s) achieved Date Met: 05/08/19    Goal: Effective Tissue Perfusion  Outcome: Ongoing (interventions implemented as appropriate)

## 2019-05-08 NOTE — PLAN OF CARE
Problem: Fall Risk (Adult)  Goal: Identify Related Risk Factors and Signs and Symptoms  Outcome: Ongoing (interventions implemented as appropriate)      Problem: Patient Care Overview  Goal: Individualization and Mutuality  Outcome: Ongoing (interventions implemented as appropriate)    Goal: Discharge Needs Assessment  Outcome: Ongoing (interventions implemented as appropriate)    Goal: Interprofessional Rounds/Family Conf  Outcome: Ongoing (interventions implemented as appropriate)      Problem: Arrhythmia/Dysrhythmia (Symptomatic) (Adult)  Goal: Signs and Symptoms of Listed Potential Problems Will be Absent, Minimized or Managed (Arrhythmia/Dysrhythmia)  Outcome: Ongoing (interventions implemented as appropriate)      Problem: Cardiac Output Decreased (Adult)  Goal: Effective Tissue Perfusion  Outcome: Ongoing (interventions implemented as appropriate)

## 2019-05-08 NOTE — PROGRESS NOTES
Patient Identification:  Name:  Yared Mckeon  Age:  68 y.o.  Sex:  male  :  1950  MRN:  [id number]  Visit Number:  [id number]    Chief Complaint:   Palpitations    Subjective: Patient seen and examined today.  Waiting for DARLING cardioversion  Had a stress test which showed mild apical infarct with no ischemia and EF of 30%.  Resting heart rate varying between 90-1 20.  ----------------------------------------------------------------------------------------------------------------------  Current Hospital Meds:    amoxicillin-clavulanate 1 tablet Oral Q12H   aspirin 81 mg Oral Daily   guaiFENesin 600 mg Oral Q12H   ipratropium 0.5 mg Nebulization 4x Daily - RT   metoprolol tartrate 37.5 mg Oral Q12H   nicotine 1 patch Transdermal Q24H   sodium chloride 3 mL Intravenous Q12H       heparin (porcine) 12 Units/kg/hr Last Rate: 14.83 Units/kg/hr (19 1128)     ----------------------------------------------------------------------------------------------------------------------  Vital Signs:  Temp:  [97.5 °F (36.4 °C)-98.7 °F (37.1 °C)] 98.6 °F (37 °C)  Heart Rate:  [100-116] 116  Resp:  [18-20] 20  BP: (103-119)/(63-80) 119/70      19  1634 19  2304 19  0300   Weight: 70.8 kg (156 lb) 75.5 kg (166 lb 6.4 oz) 75.3 kg (166 lb)     Body mass index is 26 kg/m².    Intake/Output Summary (Last 24 hours) at 2019  Last data filed at 2019 1717  Gross per 24 hour   Intake --   Output 900 ml   Net -900 ml     Diet Regular  ----------------------------------------------------------------------------------------------------------------------  Physical exam:  Constitutional:    HENT:  Head:  Normocephalic and atraumatic.    Eyes:  Conjunctivae and EOM are normal.  Pupils are equal, round, and reactive to light.  No scleral icterus.    Neck:  Neck supple.  No JVD present.    Cardiovascular: Normal rate, regular rhythm, S1 S2+, NO S3 / S4  Pulmonary/Chest:  Vesicular breath sounds B/L  Abdominal:   Soft.  Bowel sounds are normal.  No distension and no tenderness.      Neurological:  Alert and oriented to person, place, and time. No focal defecits  Skin:  Skin is warm and dry. No rash noted. No pallor.   Musculoskeletal:  No edema, no tenderness, and no deformity.  No red or swollen joints anywhere.   Peripheral vascular:  2+ Pulses B/L DP  ----------------------------------------------------------------------------------------------------------------------    ----------------------------------------------------------------------------------------------------------------------  Results from last 7 days   Lab Units 05/06/19 1957 05/06/19  1701   TROPONIN T ng/mL <0.010 <0.010     Results from last 7 days   Lab Units 05/08/19  0035 05/07/19  1133 05/07/19  0655 05/06/19  2324 05/06/19 1957 05/06/19  1753 05/06/19  1701   CRP mg/dL 2.44*  --   --   --  2.97*  --   --    WBC 10*3/mm3 7.99  --  7.80  --   --   --  7.32   HEMOGLOBIN g/dL 14.3  --  15.3  --   --   --  15.5   HEMATOCRIT % 44.0  --  46.1  --   --   --  46.2   MCV fL 93.4  --  92.2  --   --   --  91.8   MCHC g/dL 32.5  --  33.2  --   --   --  33.5   PLATELETS 10*3/mm3 117*  --  111*  --   --   --  121*   INR   --  1.13*  --  1.12*  --  1.05  --          Results from last 7 days   Lab Units 05/08/19  0035 05/07/19  0655 05/06/19  1957 05/06/19  1701   SODIUM mmol/L 140 143  --  140   POTASSIUM mmol/L 4.5 3.9  --  4.1   MAGNESIUM mg/dL 2.4 1.7 1.7  --    CHLORIDE mmol/L 110* 109*  --  105   CO2 mmol/L 19.7* 19.5*  --  20.6*   BUN mg/dL 21 18  --  17   CREATININE mg/dL 0.88 0.75*  --  0.72*   EGFR IF NONAFRICN AM mL/min/1.73 86 104  --  109   CALCIUM mg/dL 8.2* 8.3*  --  8.5*   GLUCOSE mg/dL 101* 94  --  84   ALBUMIN g/dL 3.28* 3.27*  --  3.59   BILIRUBIN mg/dL 1.6* 1.8*  --  2.3*   ALK PHOS U/L 74 77  --  81   AST (SGOT) U/L 19 22  --  25   ALT (SGPT) U/L 20 22  --  22   Estimated Creatinine Clearance: 85.6 mL/min (by C-G formula based on SCr of 0.88  mg/dL).    No results found for: AMMONIA      Blood Culture   Date Value Ref Range Status   05/06/2019 No growth at 24 hours  Preliminary   05/06/2019 No growth at 24 hours  Preliminary                I have personally looked at the labs and they are summarized above.  ----------------------------------------------------------------------------------------------------------------------  Imaging Results (last 24 hours)     ** No results found for the last 24 hours. **        ----------------------------------------------------------------------------------------------------------------------    Assessment:  Persistent atrial fibrillation  Dilated cardiomyopathy  Acute on chronic combined systolic and diastolic CHF.    Plan:  His sister showed a possibility of an apical infarct, small, this does not explain his severely reduced LV systolic function, his cardiomyopathy is likely nonischemic from prolonged A. fib.  Since he is not having any active chest pain and normal troponin I do not recommend cardiac cath for now.    Plan on DARLING, if no intracardiac thrombus then electrical cardioversion in the morning.       Jonas Cheek MD, [facility]  Interventional Cardiology        05/08/19  7:24 PM

## 2019-05-08 NOTE — PLAN OF CARE
Problem: Fall Risk (Adult)  Goal: Absence of Fall  Outcome: Ongoing (interventions implemented as appropriate)      Problem: Cardiac Output Decreased (Adult)  Goal: Effective Tissue Perfusion  Outcome: Ongoing (interventions implemented as appropriate)

## 2019-05-09 ENCOUNTER — APPOINTMENT (OUTPATIENT)
Dept: CARDIOLOGY | Facility: HOSPITAL | Age: 69
End: 2019-05-09

## 2019-05-09 ENCOUNTER — ANESTHESIA (OUTPATIENT)
Dept: CARDIOLOGY | Facility: HOSPITAL | Age: 69
End: 2019-05-09

## 2019-05-09 ENCOUNTER — ANESTHESIA EVENT (OUTPATIENT)
Dept: CARDIOLOGY | Facility: HOSPITAL | Age: 69
End: 2019-05-09

## 2019-05-09 ENCOUNTER — APPOINTMENT (OUTPATIENT)
Dept: GENERAL RADIOLOGY | Facility: HOSPITAL | Age: 69
End: 2019-05-09

## 2019-05-09 VITALS — TEMPERATURE: 98 F | SYSTOLIC BLOOD PRESSURE: 110 MMHG | DIASTOLIC BLOOD PRESSURE: 85 MMHG | OXYGEN SATURATION: 94 %

## 2019-05-09 LAB
ALBUMIN SERPL-MCNC: 3.45 G/DL (ref 3.5–5.2)
ALBUMIN/GLOB SERPL: 1.2 G/DL
ALP SERPL-CCNC: 75 U/L (ref 39–117)
ALT SERPL W P-5'-P-CCNC: 17 U/L (ref 1–41)
ANION GAP SERPL CALCULATED.3IONS-SCNC: 10 MMOL/L
APTT PPP: 64.5 SECONDS (ref 23.8–36.1)
APTT PPP: 77.5 SECONDS (ref 23.8–36.1)
AST SERPL-CCNC: 15 U/L (ref 1–40)
BASOPHILS # BLD AUTO: 0.05 10*3/MM3 (ref 0–0.2)
BASOPHILS NFR BLD AUTO: 0.6 % (ref 0–1.5)
BH CV ECHO MEAS - BSA(HAYCOCK): 1.9 M^2
BH CV ECHO MEAS - BSA: 1.9 M^2
BH CV ECHO MEAS - BZI_BMI: 26 KILOGRAMS/M^2
BH CV ECHO MEAS - BZI_METRIC_HEIGHT: 170.2 CM
BH CV ECHO MEAS - BZI_METRIC_WEIGHT: 75.3 KG
BH CV ECHO MEAS - RAP SYSTOLE: 10 MMHG
BH CV ECHO MEAS - RVSP: 28.2 MMHG
BH CV ECHO MEAS - TR MAX VEL: 213.2 CM/SEC
BILIRUB SERPL-MCNC: 1.2 MG/DL (ref 0.2–1.2)
BUN BLD-MCNC: 17 MG/DL (ref 8–23)
BUN/CREAT SERPL: 19.5 (ref 7–25)
CALCIUM SPEC-SCNC: 8.5 MG/DL (ref 8.6–10.5)
CHLORIDE SERPL-SCNC: 106 MMOL/L (ref 98–107)
CHOLEST SERPL-MCNC: 129 MG/DL (ref 0–200)
CO2 SERPL-SCNC: 21 MMOL/L (ref 22–29)
CREAT BLD-MCNC: 0.87 MG/DL (ref 0.76–1.27)
DEPRECATED RDW RBC AUTO: 48.7 FL (ref 37–54)
EOSINOPHIL # BLD AUTO: 0.15 10*3/MM3 (ref 0–0.4)
EOSINOPHIL NFR BLD AUTO: 1.7 % (ref 0.3–6.2)
ERYTHROCYTE [DISTWIDTH] IN BLOOD BY AUTOMATED COUNT: 14.3 % (ref 12.3–15.4)
GFR SERPL CREATININE-BSD FRML MDRD: 87 ML/MIN/1.73
GLOBULIN UR ELPH-MCNC: 2.9 GM/DL
GLUCOSE BLD-MCNC: 130 MG/DL (ref 65–99)
GLUCOSE BLDC GLUCOMTR-MCNC: 124 MG/DL (ref 70–130)
HCT VFR BLD AUTO: 43.5 % (ref 37.5–51)
HDLC SERPL-MCNC: 49 MG/DL (ref 40–60)
HGB BLD-MCNC: 14.4 G/DL (ref 13–17.7)
IMM GRANULOCYTES # BLD AUTO: 0.03 10*3/MM3 (ref 0–0.05)
IMM GRANULOCYTES NFR BLD AUTO: 0.3 % (ref 0–0.5)
LDLC SERPL CALC-MCNC: 60 MG/DL (ref 0–100)
LDLC/HDLC SERPL: 1.23 {RATIO}
LYMPHOCYTES # BLD AUTO: 2.01 10*3/MM3 (ref 0.7–3.1)
LYMPHOCYTES NFR BLD AUTO: 22.7 % (ref 19.6–45.3)
MCH RBC QN AUTO: 31 PG (ref 26.6–33)
MCHC RBC AUTO-ENTMCNC: 33.1 G/DL (ref 31.5–35.7)
MCV RBC AUTO: 93.8 FL (ref 79–97)
MONOCYTES # BLD AUTO: 1.48 10*3/MM3 (ref 0.1–0.9)
MONOCYTES NFR BLD AUTO: 16.7 % (ref 5–12)
NEUTROPHILS # BLD AUTO: 5.14 10*3/MM3 (ref 1.7–7)
NEUTROPHILS NFR BLD AUTO: 58 % (ref 42.7–76)
NT-PROBNP SERPL-MCNC: 4458 PG/ML (ref 5–900)
PLATELET # BLD AUTO: 129 10*3/MM3 (ref 140–450)
PMV BLD AUTO: 12.9 FL (ref 6–12)
POTASSIUM BLD-SCNC: 4.4 MMOL/L (ref 3.5–5.2)
PROT SERPL-MCNC: 6.3 G/DL (ref 6–8.5)
RBC # BLD AUTO: 4.64 10*6/MM3 (ref 4.14–5.8)
SODIUM BLD-SCNC: 137 MMOL/L (ref 136–145)
TRIGL SERPL-MCNC: 99 MG/DL (ref 0–150)
TROPONIN T SERPL-MCNC: <0.01 NG/ML (ref 0–0.03)
VLDLC SERPL-MCNC: 19.8 MG/DL
WBC NRBC COR # BLD: 8.86 10*3/MM3 (ref 3.4–10.8)

## 2019-05-09 PROCEDURE — 93325 DOPPLER ECHO COLOR FLOW MAPG: CPT | Performed by: INTERNAL MEDICINE

## 2019-05-09 PROCEDURE — 93312 ECHO TRANSESOPHAGEAL: CPT | Performed by: INTERNAL MEDICINE

## 2019-05-09 PROCEDURE — 25010000002 FUROSEMIDE PER 20 MG: Performed by: PHYSICIAN ASSISTANT

## 2019-05-09 PROCEDURE — 93005 ELECTROCARDIOGRAM TRACING: CPT | Performed by: PHYSICIAN ASSISTANT

## 2019-05-09 PROCEDURE — 93010 ELECTROCARDIOGRAM REPORT: CPT | Performed by: INTERNAL MEDICINE

## 2019-05-09 PROCEDURE — 93321 DOPPLER ECHO F-UP/LMTD STD: CPT

## 2019-05-09 PROCEDURE — 93312 ECHO TRANSESOPHAGEAL: CPT

## 2019-05-09 PROCEDURE — 85730 THROMBOPLASTIN TIME PARTIAL: CPT | Performed by: INTERNAL MEDICINE

## 2019-05-09 PROCEDURE — 80061 LIPID PANEL: CPT | Performed by: INTERNAL MEDICINE

## 2019-05-09 PROCEDURE — 25010000002 PROPOFOL 10 MG/ML EMULSION: Performed by: NURSE ANESTHETIST, CERTIFIED REGISTERED

## 2019-05-09 PROCEDURE — 82962 GLUCOSE BLOOD TEST: CPT

## 2019-05-09 PROCEDURE — 83880 ASSAY OF NATRIURETIC PEPTIDE: CPT | Performed by: PHYSICIAN ASSISTANT

## 2019-05-09 PROCEDURE — 93005 ELECTROCARDIOGRAM TRACING: CPT | Performed by: INTERNAL MEDICINE

## 2019-05-09 PROCEDURE — 99232 SBSQ HOSP IP/OBS MODERATE 35: CPT | Performed by: INTERNAL MEDICINE

## 2019-05-09 PROCEDURE — 5A2204Z RESTORATION OF CARDIAC RHYTHM, SINGLE: ICD-10-PCS | Performed by: INTERNAL MEDICINE

## 2019-05-09 PROCEDURE — 93325 DOPPLER ECHO COLOR FLOW MAPG: CPT

## 2019-05-09 PROCEDURE — 94799 UNLISTED PULMONARY SVC/PX: CPT

## 2019-05-09 PROCEDURE — 92960 CARDIOVERSION ELECTRIC EXT: CPT

## 2019-05-09 PROCEDURE — 84484 ASSAY OF TROPONIN QUANT: CPT | Performed by: PHYSICIAN ASSISTANT

## 2019-05-09 PROCEDURE — 71045 X-RAY EXAM CHEST 1 VIEW: CPT | Performed by: RADIOLOGY

## 2019-05-09 PROCEDURE — 93321 DOPPLER ECHO F-UP/LMTD STD: CPT | Performed by: INTERNAL MEDICINE

## 2019-05-09 PROCEDURE — 85025 COMPLETE CBC W/AUTO DIFF WBC: CPT | Performed by: PHYSICIAN ASSISTANT

## 2019-05-09 PROCEDURE — 80053 COMPREHEN METABOLIC PANEL: CPT | Performed by: PHYSICIAN ASSISTANT

## 2019-05-09 PROCEDURE — 71045 X-RAY EXAM CHEST 1 VIEW: CPT

## 2019-05-09 RX ORDER — PROPOFOL 10 MG/ML
VIAL (ML) INTRAVENOUS AS NEEDED
Status: DISCONTINUED | OUTPATIENT
Start: 2019-05-09 | End: 2019-05-09 | Stop reason: SURG

## 2019-05-09 RX ORDER — SODIUM CHLORIDE 0.9 % (FLUSH) 0.9 %
3 SYRINGE (ML) INJECTION EVERY 12 HOURS SCHEDULED
Status: DISCONTINUED | OUTPATIENT
Start: 2019-05-09 | End: 2019-05-10 | Stop reason: HOSPADM

## 2019-05-09 RX ORDER — SODIUM CHLORIDE 9 MG/ML
INJECTION, SOLUTION INTRAVENOUS CONTINUOUS PRN
Status: DISCONTINUED | OUTPATIENT
Start: 2019-05-09 | End: 2019-05-09 | Stop reason: SURG

## 2019-05-09 RX ORDER — LISINOPRIL 2.5 MG/1
2.5 TABLET ORAL
Status: DISCONTINUED | OUTPATIENT
Start: 2019-05-09 | End: 2019-05-09

## 2019-05-09 RX ORDER — FUROSEMIDE 10 MG/ML
20 INJECTION INTRAMUSCULAR; INTRAVENOUS ONCE
Status: COMPLETED | OUTPATIENT
Start: 2019-05-09 | End: 2019-05-09

## 2019-05-09 RX ORDER — SODIUM CHLORIDE, SODIUM LACTATE, POTASSIUM CHLORIDE, CALCIUM CHLORIDE 600; 310; 30; 20 MG/100ML; MG/100ML; MG/100ML; MG/100ML
125 INJECTION, SOLUTION INTRAVENOUS CONTINUOUS
Status: DISCONTINUED | OUTPATIENT
Start: 2019-05-09 | End: 2019-05-09

## 2019-05-09 RX ORDER — AMIODARONE HYDROCHLORIDE 200 MG/1
400 TABLET ORAL EVERY 12 HOURS SCHEDULED
Status: DISCONTINUED | OUTPATIENT
Start: 2019-05-09 | End: 2019-05-10 | Stop reason: HOSPADM

## 2019-05-09 RX ORDER — LISINOPRIL 2.5 MG/1
2.5 TABLET ORAL
Status: DISCONTINUED | OUTPATIENT
Start: 2019-05-10 | End: 2019-05-10 | Stop reason: HOSPADM

## 2019-05-09 RX ORDER — SODIUM CHLORIDE 0.9 % (FLUSH) 0.9 %
3-10 SYRINGE (ML) INJECTION AS NEEDED
Status: DISCONTINUED | OUTPATIENT
Start: 2019-05-09 | End: 2019-05-10 | Stop reason: HOSPADM

## 2019-05-09 RX ADMIN — PROPOFOL 90 MG: 10 INJECTION, EMULSION INTRAVENOUS at 09:59

## 2019-05-09 RX ADMIN — PROPOFOL 30 MG: 10 INJECTION, EMULSION INTRAVENOUS at 10:08

## 2019-05-09 RX ADMIN — NICOTINE 1 PATCH: 21 PATCH, EXTENDED RELEASE TRANSDERMAL at 11:31

## 2019-05-09 RX ADMIN — ASPIRIN 81 MG: 81 TABLET, CHEWABLE ORAL at 11:30

## 2019-05-09 RX ADMIN — IPRATROPIUM BROMIDE 0.5 MG: 0.5 SOLUTION RESPIRATORY (INHALATION) at 14:00

## 2019-05-09 RX ADMIN — FUROSEMIDE 20 MG: 10 INJECTION, SOLUTION INTRAMUSCULAR; INTRAVENOUS at 21:30

## 2019-05-09 RX ADMIN — GUAIFENESIN 600 MG: 600 TABLET, EXTENDED RELEASE ORAL at 20:59

## 2019-05-09 RX ADMIN — AMOXICILLIN AND CLAVULANATE POTASSIUM 1 TABLET: 875; 125 TABLET, FILM COATED ORAL at 20:59

## 2019-05-09 RX ADMIN — AMIODARONE HYDROCHLORIDE 400 MG: 200 TABLET ORAL at 20:59

## 2019-05-09 RX ADMIN — SODIUM CHLORIDE: 9 INJECTION, SOLUTION INTRAVENOUS at 09:56

## 2019-05-09 RX ADMIN — IPRATROPIUM BROMIDE 0.5 MG: 0.5 SOLUTION RESPIRATORY (INHALATION) at 06:40

## 2019-05-09 RX ADMIN — GUAIFENESIN 600 MG: 600 TABLET, EXTENDED RELEASE ORAL at 11:30

## 2019-05-09 RX ADMIN — PROPOFOL 30 MG: 10 INJECTION, EMULSION INTRAVENOUS at 10:01

## 2019-05-09 RX ADMIN — APIXABAN 5 MG: 5 TABLET, FILM COATED ORAL at 15:51

## 2019-05-09 RX ADMIN — APIXABAN 5 MG: 5 TABLET, FILM COATED ORAL at 20:59

## 2019-05-09 RX ADMIN — AMOXICILLIN AND CLAVULANATE POTASSIUM 1 TABLET: 875; 125 TABLET, FILM COATED ORAL at 11:30

## 2019-05-09 RX ADMIN — AMIODARONE HYDROCHLORIDE 400 MG: 200 TABLET ORAL at 11:30

## 2019-05-09 NOTE — PROGRESS NOTES
"HOSPITALIST PROGRESS NOTE    Patient Identification:  Name:  Yared Mckeon  Age:  68 y.o.  Sex:  male  :  1950  MRN:  [id number]  Visit Number:  [id number]  Primary Care Provider:  Tiera Tan APRN    Length of stay:  3     HPI: 69 yo male being seen in follow up for newly diagnosed Afib    Subjective:  The patient was seen earlier today. He is resting in bed; no acute distress. Patient continues to report some \"smothering\" but states that it is overall improving. No chest pressure today. He reports occasional cough; triggered by his inhalents.     Patient denies any nausea, abdominal pain and/or vomiting.     Patient underwent DARLING and Cardioversion today; he required 2 shocks but remains in NSR at this time.    Present during exam: ZAY Kelley and patient's significant other    Current Hospital Meds:    amiodarone 400 mg Oral Q12H   amoxicillin-clavulanate 1 tablet Oral Q12H   apixaban 5 mg Oral Q12H   aspirin 81 mg Oral Daily   guaiFENesin 600 mg Oral Q12H   ipratropium 0.5 mg Nebulization 4x Daily - RT   metoprolol tartrate 37.5 mg Oral Q12H   nicotine 1 patch Transdermal Q24H   sodium chloride 3 mL Intravenous Q12H   sodium chloride 3 mL Intravenous Q12H       lactated ringers 125 mL/hr Last Rate: 125 mL/hr (19 1234)       Vital Signs  Temp:  [97.4 °F (36.3 °C)-98.6 °F (37 °C)] 97.6 °F (36.4 °C)  Heart Rate:  [] 98  Resp:  [17-22] 18  BP: ()/(45-97) 103/73      19  2304 19  0300 19  0305   Weight: 75.5 kg (166 lb 6.4 oz) 75.3 kg (166 lb) 74.9 kg (165 lb 3.2 oz)     Body mass index is 25.87 kg/m².     Physical exam:  Physical Exam   Constitutional: He is oriented to person, place, and time. He appears well-developed and well-nourished. No distress.   HENT:   Head: Normocephalic and atraumatic.   Mouth/Throat: Oropharynx is clear and moist.   Eyes: Conjunctivae and EOM are normal. Pupils are equal, round, and reactive to light.   Neck: Neck supple. No tracheal " deviation present. No thyromegaly present.   Cardiovascular: Normal rate and regular rhythm. Exam reveals no gallop and no friction rub.   No murmur heard.  Pulses:       Dorsalis pedis pulses are 2+ on the right side, and 2+ on the left side.        Posterior tibial pulses are 2+ on the right side, and 2+ on the left side.   Pulmonary/Chest: Breath sounds normal. No respiratory distress. He has no wheezes. He has no rales.   Abdominal: Soft. Bowel sounds are normal. He exhibits no distension. There is no tenderness. There is no guarding.   Musculoskeletal: Normal range of motion. He exhibits no tenderness.   Neurological: He is alert and oriented to person, place, and time. No cranial nerve deficit.   Skin: Skin is warm and dry. No rash noted. No erythema.   Psychiatric: He has a normal mood and affect.      Telemetry: sinus rhythm with occasional PVCs    Results Review:  Results from last 7 days   Lab Units 05/08/19 0035 05/07/19 0655 05/06/19  1701   WBC 10*3/mm3 7.99 7.80 7.32   HEMOGLOBIN g/dL 14.3 15.3 15.5   HEMATOCRIT % 44.0 46.1 46.2   PLATELETS 10*3/mm3 117* 111* 121*     Results from last 7 days   Lab Units 05/08/19 0035 05/07/19  0655 05/06/19  1701   SODIUM mmol/L 140 143 140   POTASSIUM mmol/L 4.5 3.9 4.1   CHLORIDE mmol/L 110* 109* 105   CO2 mmol/L 19.7* 19.5* 20.6*   BUN mg/dL 21 18 17   CREATININE mg/dL 0.88 0.75* 0.72*   CALCIUM mg/dL 8.2* 8.3* 8.5*   GLUCOSE mg/dL 101* 94 84     Results from last 7 days   Lab Units 05/08/19  0035 05/07/19  0655 05/06/19  1701   BILIRUBIN mg/dL 1.6* 1.8* 2.3*   ALK PHOS U/L 74 77 81   AST (SGOT) U/L 19 22 25   ALT (SGPT) U/L 20 22 22     Results from last 7 days   Lab Units 05/08/19 0035 05/07/19 0655 05/06/19  1957   MAGNESIUM mg/dL 2.4 1.7 1.7     Results from last 7 days   Lab Units 05/07/19  1133 05/06/19  2324 05/06/19  1753   INR  1.13* 1.12* 1.05     Results from last 7 days   Lab Units 05/06/19  1957 05/06/19  1701   TROPONIN T ng/mL <0.010 <0.010      Echocardiogram:  · Left ventricular systolic function is severely decreased. Estimated EF appears to be in the range of 26 - 30% with severe global hypokinesia.  · The left ventricular cavity is mildly dilated.  · Left ventricular diastolic dysfunction (grade II) consistent with pseudonormalization.  · Mildly reduced right ventricular systolic function noted.  · Left atrial cavity size is mildly dilated.  · Moderate mitral valve regurgitation is present  · Moderate tricuspid valve regurgitation is present.  · Mild to moderate aortic valve regurgitation is present.  · Mild pulmonary hypertension is present  · There is no evidence of pericardial effusion.      Assessment/Plan     -New onset atrial fibrillation, presenting with rapid ventricular response, resolved with cardioversion: Continue with amiodarone per Cardiology. Continue metoprolol. Heparin infusion has been converted to eliquis. Continue to monitor closely on telemetry.    -Acute combined systolic and diastolic CHF with dilated cardiomyopathy likely non-ischemic from atrial fibrillation: Continue with metoprolol and eliquis. Will discuss with cardiology regarding maximal medical management. Dr. Lynne does not advise a life vest at discharge as his cardiomyopathy is not likely ischemic.     -Prolonged QTc: Continue to monitor on telemetry.     -SIRS criteria; chest xray negative for pneumonia: Continue augmentin and make atrovent as needed.     -Mild thrombocytopenia: Will monitor closely on anticoagulation.     -Tobacco abuse: Continue nicotine patch;  regarding cessation.     -Acute hypomagnesemia: Resolved; continue to supplement per protocol as needed.    The patient is high risk due to the following diagnoses/reasons:  New onset atrial fibrillation with combined CHF.    I discussed the patients findings and my recommendations with patient, significant other and nursing staff.    Disposition  Home when medically stable; possibly  tomorrow.    Rose Cervantes DO  05/09/19  4:27 PM

## 2019-05-09 NOTE — PLAN OF CARE
Problem: Fall Risk (Adult)  Goal: Identify Related Risk Factors and Signs and Symptoms  Outcome: Ongoing (interventions implemented as appropriate)      Problem: Patient Care Overview  Goal: Individualization and Mutuality  Outcome: Ongoing (interventions implemented as appropriate)    Goal: Discharge Needs Assessment  Outcome: Ongoing (interventions implemented as appropriate)      Problem: Arrhythmia/Dysrhythmia (Symptomatic) (Adult)  Goal: Signs and Symptoms of Listed Potential Problems Will be Absent, Minimized or Managed (Arrhythmia/Dysrhythmia)  Outcome: Ongoing (interventions implemented as appropriate)      Problem: Cardiac Output Decreased (Adult)  Goal: Effective Tissue Perfusion  Outcome: Ongoing (interventions implemented as appropriate)

## 2019-05-09 NOTE — PROGRESS NOTES
Patient Identification:  Name:  Yared Mckeon  Age:  68 y.o.  Sex:  male  :  1950  MRN:  [id number]  Visit Number:  [id number]    Chief Complaint:   Palpitations    Subjective:    Patient will follow from tomorrow morning which showed normal hemoglobin of the appropriate operative thrombus, left atrium was moderately dilated.  He had a cardioversion x2 with 200 J with conversion to sinus rhythm.  ----------------------------------------------------------------------------------------------------------------------  Current Hospital Meds:    amiodarone 400 mg Oral Q12H   amoxicillin-clavulanate 1 tablet Oral Q12H   apixaban 5 mg Oral Q12H   aspirin 81 mg Oral Daily   guaiFENesin 600 mg Oral Q12H   ipratropium 0.5 mg Nebulization 4x Daily - RT   metoprolol tartrate 37.5 mg Oral Q12H   nicotine 1 patch Transdermal Q24H   sodium chloride 3 mL Intravenous Q12H        ----------------------------------------------------------------------------------------------------------------------  Vital Signs:  Temp:  [97.9 °F (36.6 °C)-98.7 °F (37.1 °C)] 97.9 °F (36.6 °C)  Heart Rate:  [] 81  Resp:  [17-22] 18  BP: ()/(45-97) 107/79      19  2304 19  0300 19  0305   Weight: 75.5 kg (166 lb 6.4 oz) 75.3 kg (166 lb) 74.9 kg (165 lb 3.2 oz)     Body mass index is 25.87 kg/m².    Intake/Output Summary (Last 24 hours) at 2019 1146  Last data filed at 2019 1017  Gross per 24 hour   Intake 320 ml   Output 800 ml   Net -480 ml     Diet Regular  ----------------------------------------------------------------------------------------------------------------------  Physical exam:  Constitutional:    HENT:  Head:  Normocephalic and atraumatic.    Eyes:  Conjunctivae and EOM are normal.  Pupils are equal, round, and reactive to light.  No scleral icterus.    Neck:  Neck supple.  No JVD present.    Cardiovascular: Normal rate, regular rhythm, S1 S2+, NO S3 / S4  Pulmonary/Chest:  Vesicular breath  sounds B/L  Abdominal:  Soft.  Bowel sounds are normal.  No distension and no tenderness.      Neurological:  Alert and oriented to person, place, and time. No focal defecits  Skin:  Skin is warm and dry. No rash noted. No pallor.   Musculoskeletal:  No edema, no tenderness, and no deformity.  No red or swollen joints anywhere.   Peripheral vascular:  2+ Pulses B/L DP  ----------------------------------------------------------------------------------------------------------------------    ----------------------------------------------------------------------------------------------------------------------  Results from last 7 days   Lab Units 05/06/19 1957 05/06/19  1701   TROPONIN T ng/mL <0.010 <0.010     Results from last 7 days   Lab Units 05/08/19  0035 05/07/19  1133 05/07/19  0655 05/06/19  2324 05/06/19 1957 05/06/19  1753 05/06/19  1701   CRP mg/dL 2.44*  --   --   --  2.97*  --   --    WBC 10*3/mm3 7.99  --  7.80  --   --   --  7.32   HEMOGLOBIN g/dL 14.3  --  15.3  --   --   --  15.5   HEMATOCRIT % 44.0  --  46.1  --   --   --  46.2   MCV fL 93.4  --  92.2  --   --   --  91.8   MCHC g/dL 32.5  --  33.2  --   --   --  33.5   PLATELETS 10*3/mm3 117*  --  111*  --   --   --  121*   INR   --  1.13*  --  1.12*  --  1.05  --          Results from last 7 days   Lab Units 05/08/19  0035 05/07/19  0655 05/06/19  1957 05/06/19  1701   SODIUM mmol/L 140 143  --  140   POTASSIUM mmol/L 4.5 3.9  --  4.1   MAGNESIUM mg/dL 2.4 1.7 1.7  --    CHLORIDE mmol/L 110* 109*  --  105   CO2 mmol/L 19.7* 19.5*  --  20.6*   BUN mg/dL 21 18  --  17   CREATININE mg/dL 0.88 0.75*  --  0.72*   EGFR IF NONAFRICN AM mL/min/1.73 86 104  --  109   CALCIUM mg/dL 8.2* 8.3*  --  8.5*   GLUCOSE mg/dL 101* 94  --  84   ALBUMIN g/dL 3.28* 3.27*  --  3.59   BILIRUBIN mg/dL 1.6* 1.8*  --  2.3*   ALK PHOS U/L 74 77  --  81   AST (SGOT) U/L 19 22  --  25   ALT (SGPT) U/L 20 22  --  22   Estimated Creatinine Clearance: 85.1 mL/min (by C-G formula  based on SCr of 0.88 mg/dL).    No results found for: AMMONIA  Results from last 7 days   Lab Units 05/09/19  0017   CHOLESTEROL mg/dL 129   TRIGLYCERIDES mg/dL 99   HDL CHOL mg/dL 49   LDL CHOL mg/dL 60     Blood Culture   Date Value Ref Range Status   05/06/2019 No growth at 2 days  Preliminary   05/06/2019 No growth at 2 days  Preliminary                I have personally looked at the labs and they are summarized above.  ----------------------------------------------------------------------------------------------------------------------  Imaging Results (last 24 hours)     ** No results found for the last 24 hours. **        ----------------------------------------------------------------------------------------------------------------------    Assessment:  Persistent atrial fibrillation requiring electrical cardioversion with successful conversion to normal sinus rhythm.  Dilated cardiomyopathy, likely nonischemic, EF 30 to 35% with severe global hypokinesia.  CAD, his stress test showed apical infarct with no significant ischemia and EF of 30%.  Dyslipidemia, LDL 60    Plan:  We will initiate antiarrhythmic medication, it will be hard to keep him in sinus rhythm given cardiomyopathy and left atrial dilatation, he will be initiated on amiodarone 400 mg twice daily for 1 week and then 400 mg daily after that.  We will stop IV heparin and continue with Eliquis 5 mg twice daily for anticoagulation.  If he maintains sinus rhythm then he can be probably discharged home tomorrow.  If he converts back into A. fib then I would recommend IV amiodarone for 24 hours.    Regarding his cardiomyopathy, since there is no enough data for LifeVest for nonischemic cardiomyopathy and he has not had any episodes of nonsustained VT or frequent PVCs, I do not think he needs a LifeVest until we decide on ICD.  We will also initiate a referral to EP for possible A. fib ablation.  Reevaluate his EF and 3 months, if his EF remains less  than 35 then he might need ICD evaluation.  I do not think his cardiomyopathy is caused by his CAD, his stress test showed a good anterior and inferior perfusion, and his wall motion was more global hypokinesia.  His troponins were negative during the hospital stay and his EKG did not show any acute ST-T changes.  I did talk to him regarding if he ever develops chest pain or has any other objective evidence for MI then we will do recommend a cardiac cath for further evaluation.    We will follow-up in my clinic in 2 to 4 weeks.        Jonas Cheek MD, [facility]  Interventional Cardiology        05/09/19  11:46 AM

## 2019-05-09 NOTE — ANESTHESIA PREPROCEDURE EVALUATION
Anesthesia Evaluation     Patient summary reviewed and Nursing notes reviewed   no history of anesthetic complications:  NPO Solid Status: > 8 hours  NPO Liquid Status: > 8 hours           Airway   Mallampati: II  TM distance: >3 FB  Neck ROM: full  No difficulty expected  Dental    (+) edentulous    Pulmonary     breath sounds clear to auscultation  (+) a smoker Current Abstained day of surgery,   Cardiovascular     ECG reviewed  Rhythm: irregular  Rate: abnormal    (+) dysrhythmias Atrial Fib,       Neuro/Psych- negative ROS  GI/Hepatic/Renal/Endo - negative ROS     Musculoskeletal (-) negative ROS    Abdominal    Substance History - negative use     OB/GYN negative ob/gyn ROS         Other - negative ROS                       Anesthesia Plan    ASA 3     general   total IV anesthesia  intravenous induction   Anesthetic plan, all risks, benefits, and alternatives have been provided, discussed and informed consent has been obtained with: patient.    Plan discussed with CRNA.

## 2019-05-09 NOTE — ANESTHESIA POSTPROCEDURE EVALUATION
Patient: Yared Mckeon    Procedure Summary     Date:  05/09/19 Room / Location:  [facility] NONINVASIVE LAB    Anesthesia Start:  0956 Anesthesia Stop:  1017    Procedure:  CARDIOVERSION EXTERNAL IN CARDIOLOGY DEPARTMENT Diagnosis:  (Afib with RVR)    Scheduled Providers:   Provider:  Pierre Abbott DO    Anesthesia Type:  general ASA Status:  3          Anesthesia Type: general  Last vitals  BP   102/74 (05/09/19 1030)   Temp   97.9 °F (36.6 °C) (05/09/19 0607)   Pulse   78 (05/09/19 1030)   Resp   20 (05/09/19 1030)     SpO2   95 % (05/09/19 1030)     Post Anesthesia Care and Evaluation    Patient location during evaluation: PHASE II  Patient participation: complete - patient participated  Level of consciousness: awake and alert and sleepy but conscious  Pain score: 0  Pain management: adequate  Airway patency: patent  Anesthetic complications: No anesthetic complications  PONV Status: controlled  Cardiovascular status: acceptable  Respiratory status: acceptable and nasal cannula  Hydration status: acceptable  No anesthesia care post op

## 2019-05-09 NOTE — PROGRESS NOTES
HOSPITALIST PROGRESS NOTE    Patient Identification:  Name:  Yared Mckeon  Age:  68 y.o.  Sex:  male  :  1950  MRN:  [id number]  Visit Number:  [id number]  Primary Care Provider:  Tiera Tan APRN    Length of stay:  2     HPI: 69 yo male being seen in follow up for newly diagnosed Afib    Subjective:  The patient was seen earlier today. He is resting in bed; no acute distress. Patient does report ongoing intermittent weakness/fatigue and dyspnea but improved from yesterday.     No chest pain today.    We discussed the results of his stress test.    Present during exam: ZAY Holt and patient's significant other    Current Hospital Meds:    amoxicillin-clavulanate 1 tablet Oral Q12H   aspirin 81 mg Oral Daily   guaiFENesin 600 mg Oral Q12H   ipratropium 0.5 mg Nebulization 4x Daily - RT   metoprolol tartrate 37.5 mg Oral Q12H   nicotine 1 patch Transdermal Q24H   sodium chloride 3 mL Intravenous Q12H       heparin (porcine) 12 Units/kg/hr Last Rate: 14.83 Units/kg/hr (19 1128)       Vital Signs  Temp:  [97.5 °F (36.4 °C)-98.7 °F (37.1 °C)] 98.6 °F (37 °C)  Heart Rate:  [100-116] 116  Resp:  [18-20] 20  BP: (103-119)/(63-80) 119/70      19  1634 19  2304 19  0300   Weight: 70.8 kg (156 lb) 75.5 kg (166 lb 6.4 oz) 75.3 kg (166 lb)     Body mass index is 26 kg/m².     Physical exam:  Physical Exam   Constitutional: He is oriented to person, place, and time. He appears well-developed and well-nourished. No distress.   HENT:   Head: Normocephalic and atraumatic.   Mouth/Throat: Oropharynx is clear and moist.   Eyes: Conjunctivae and EOM are normal. Pupils are equal, round, and reactive to light.   Neck: Neck supple. No tracheal deviation present. No thyromegaly present.   Cardiovascular: Normal rate. An irregularly irregular rhythm present. Exam reveals no gallop and no friction rub.   No murmur heard.  Pulses:       Dorsalis pedis pulses are 2+ on the right side, and 2+ on the left  side.        Posterior tibial pulses are 2+ on the right side, and 2+ on the left side.   Pulmonary/Chest: Breath sounds normal. No respiratory distress. He has no wheezes. He has no rales.   Abdominal: Soft. Bowel sounds are normal. He exhibits no distension. There is no tenderness. There is no guarding.   Musculoskeletal: Normal range of motion. He exhibits no tenderness.   Neurological: He is alert and oriented to person, place, and time. No cranial nerve deficit.   Skin: Skin is warm and dry. No rash noted. No erythema.   Psychiatric: He has a normal mood and affect.      Telemetry: Atrial fibrillation 100s-1 teens    Results Review:  Results from last 7 days   Lab Units 05/08/19  0035 05/07/19  0655 05/06/19  1701   WBC 10*3/mm3 7.99 7.80 7.32   HEMOGLOBIN g/dL 14.3 15.3 15.5   HEMATOCRIT % 44.0 46.1 46.2   PLATELETS 10*3/mm3 117* 111* 121*     Results from last 7 days   Lab Units 05/08/19  0035 05/07/19  0655 05/06/19  1701   SODIUM mmol/L 140 143 140   POTASSIUM mmol/L 4.5 3.9 4.1   CHLORIDE mmol/L 110* 109* 105   CO2 mmol/L 19.7* 19.5* 20.6*   BUN mg/dL 21 18 17   CREATININE mg/dL 0.88 0.75* 0.72*   CALCIUM mg/dL 8.2* 8.3* 8.5*   GLUCOSE mg/dL 101* 94 84     Results from last 7 days   Lab Units 05/08/19  0035 05/07/19  0655 05/06/19  1701   BILIRUBIN mg/dL 1.6* 1.8* 2.3*   ALK PHOS U/L 74 77 81   AST (SGOT) U/L 19 22 25   ALT (SGPT) U/L 20 22 22     Results from last 7 days   Lab Units 05/08/19 0035 05/07/19  0655 05/06/19 1957   MAGNESIUM mg/dL 2.4 1.7 1.7     Results from last 7 days   Lab Units 05/07/19  1133 05/06/19  2324 05/06/19  1753   INR  1.13* 1.12* 1.05     Results from last 7 days   Lab Units 05/06/19 1957 05/06/19  1701   TROPONIN T ng/mL <0.010 <0.010     Echocardiogram:  · Left ventricular systolic function is severely decreased. Estimated EF appears to be in the range of 26 - 30% with severe global hypokinesia.  · The left ventricular cavity is mildly dilated.  · Left ventricular  diastolic dysfunction (grade II) consistent with pseudonormalization.  · Mildly reduced right ventricular systolic function noted.  · Left atrial cavity size is mildly dilated.  · Moderate mitral valve regurgitation is present  · Moderate tricuspid valve regurgitation is present.  · Mild to moderate aortic valve regurgitation is present.  · Mild pulmonary hypertension is present  · There is no evidence of pericardial effusion.      Assessment/Plan     -New onset atrial fibrillation, presenting with rapid ventricular response: Continue heparin infusion and rate controlling agent. Dose of metoprolol was increased by Cardiology; HR improving. Plan for NPO at [facility] for DARLING and possible cardioversion.    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 NPO at [facility] as noted above for DARLING and possible cardioversion.    -Prolonged QTc: Continue to monitor on telemetry.     -SIRS criteria; chest xray negative for pneumonia: Will plan to convert antibiotics to PO to decrease volume. Continue scheduled atrovent.     -Mild thrombocytopenia: Will monitor closely on heparin infusion.     -Tobacco abuse: Continue nicotine patch;  regarding cessation.     -Acute hypomagnesemia: Resolved; continue to supplement per protocol as needed.    The patient is high risk due to the following diagnoses/reasons:  New onset atrial fibrillation with combined CHF.    I discussed the patients findings and my recommendations with patient and nursing staff.    Disposition  Home when medically stable.    Rose Cervantes DO  05/08/19  9:22 PM

## 2019-05-10 VITALS
WEIGHT: 161.9 LBS | OXYGEN SATURATION: 94 % | TEMPERATURE: 98.2 F | BODY MASS INDEX: 25.41 KG/M2 | SYSTOLIC BLOOD PRESSURE: 133 MMHG | DIASTOLIC BLOOD PRESSURE: 90 MMHG | HEART RATE: 86 BPM | HEIGHT: 67 IN | RESPIRATION RATE: 18 BRPM

## 2019-05-10 LAB
ALBUMIN SERPL-MCNC: 3.43 G/DL (ref 3.5–5.2)
ALBUMIN/GLOB SERPL: 1.2 G/DL
ALP SERPL-CCNC: 74 U/L (ref 39–117)
ALT SERPL W P-5'-P-CCNC: 16 U/L (ref 1–41)
ANION GAP SERPL CALCULATED.3IONS-SCNC: 12.6 MMOL/L
AST SERPL-CCNC: 17 U/L (ref 1–40)
BASOPHILS # BLD AUTO: 0.05 10*3/MM3 (ref 0–0.2)
BASOPHILS NFR BLD AUTO: 0.5 % (ref 0–1.5)
BILIRUB SERPL-MCNC: 1.5 MG/DL (ref 0.2–1.2)
BUN BLD-MCNC: 16 MG/DL (ref 8–23)
BUN/CREAT SERPL: 16.3 (ref 7–25)
CALCIUM SPEC-SCNC: 8.9 MG/DL (ref 8.6–10.5)
CHLORIDE SERPL-SCNC: 105 MMOL/L (ref 98–107)
CO2 SERPL-SCNC: 20.4 MMOL/L (ref 22–29)
CREAT BLD-MCNC: 0.98 MG/DL (ref 0.76–1.27)
DEPRECATED RDW RBC AUTO: 46.2 FL (ref 37–54)
EOSINOPHIL # BLD AUTO: 0.28 10*3/MM3 (ref 0–0.4)
EOSINOPHIL NFR BLD AUTO: 2.8 % (ref 0.3–6.2)
ERYTHROCYTE [DISTWIDTH] IN BLOOD BY AUTOMATED COUNT: 14.2 % (ref 12.3–15.4)
GFR SERPL CREATININE-BSD FRML MDRD: 76 ML/MIN/1.73
GLOBULIN UR ELPH-MCNC: 2.9 GM/DL
GLUCOSE BLD-MCNC: 99 MG/DL (ref 65–99)
HCT VFR BLD AUTO: 44.4 % (ref 37.5–51)
HGB BLD-MCNC: 14.7 G/DL (ref 13–17.7)
IMM GRANULOCYTES # BLD AUTO: 0.03 10*3/MM3 (ref 0–0.05)
IMM GRANULOCYTES NFR BLD AUTO: 0.3 % (ref 0–0.5)
LYMPHOCYTES # BLD AUTO: 2.09 10*3/MM3 (ref 0.7–3.1)
LYMPHOCYTES NFR BLD AUTO: 20.7 % (ref 19.6–45.3)
MAGNESIUM SERPL-MCNC: 1.7 MG/DL (ref 1.6–2.4)
MCH RBC QN AUTO: 31.1 PG (ref 26.6–33)
MCHC RBC AUTO-ENTMCNC: 33.1 G/DL (ref 31.5–35.7)
MCV RBC AUTO: 93.9 FL (ref 79–97)
MONOCYTES # BLD AUTO: 1.59 10*3/MM3 (ref 0.1–0.9)
MONOCYTES NFR BLD AUTO: 15.8 % (ref 5–12)
NEUTROPHILS # BLD AUTO: 6.04 10*3/MM3 (ref 1.7–7)
NEUTROPHILS NFR BLD AUTO: 59.9 % (ref 42.7–76)
PLATELET # BLD AUTO: 122 10*3/MM3 (ref 140–450)
PMV BLD AUTO: 12.7 FL (ref 6–12)
POTASSIUM BLD-SCNC: 4.3 MMOL/L (ref 3.5–5.2)
PROT SERPL-MCNC: 6.3 G/DL (ref 6–8.5)
RBC # BLD AUTO: 4.73 10*6/MM3 (ref 4.14–5.8)
SODIUM BLD-SCNC: 138 MMOL/L (ref 136–145)
WBC NRBC COR # BLD: 10.08 10*3/MM3 (ref 3.4–10.8)

## 2019-05-10 PROCEDURE — 80053 COMPREHEN METABOLIC PANEL: CPT | Performed by: INTERNAL MEDICINE

## 2019-05-10 PROCEDURE — 83735 ASSAY OF MAGNESIUM: CPT | Performed by: INTERNAL MEDICINE

## 2019-05-10 PROCEDURE — 99239 HOSP IP/OBS DSCHRG MGMT >30: CPT | Performed by: INTERNAL MEDICINE

## 2019-05-10 PROCEDURE — 85025 COMPLETE CBC W/AUTO DIFF WBC: CPT | Performed by: INTERNAL MEDICINE

## 2019-05-10 RX ORDER — SPIRONOLACTONE 25 MG/1
12.5 TABLET ORAL DAILY
Qty: 30 TABLET | Refills: 0 | Status: ON HOLD | OUTPATIENT
Start: 2019-05-10 | End: 2020-05-18

## 2019-05-10 RX ORDER — AMIODARONE HYDROCHLORIDE 200 MG/1
400 TABLET ORAL EVERY 12 HOURS SCHEDULED
Qty: 28 TABLET | Refills: 0 | Status: SHIPPED | OUTPATIENT
Start: 2019-05-10 | End: 2019-05-17

## 2019-05-10 RX ORDER — NICOTINE 21 MG/24HR
1 PATCH, TRANSDERMAL 24 HOURS TRANSDERMAL EVERY 24 HOURS
Qty: 30 PATCH | Refills: 0 | Status: SHIPPED | OUTPATIENT
Start: 2019-05-10 | End: 2019-06-09

## 2019-05-10 RX ORDER — AMOXICILLIN AND CLAVULANATE POTASSIUM 875; 125 MG/1; MG/1
1 TABLET, FILM COATED ORAL EVERY 12 HOURS SCHEDULED
Qty: 6 TABLET | Refills: 0 | Status: SHIPPED | OUTPATIENT
Start: 2019-05-10 | End: 2019-05-13

## 2019-05-10 RX ORDER — FUROSEMIDE 20 MG/1
20 TABLET ORAL DAILY PRN
Status: DISCONTINUED | OUTPATIENT
Start: 2019-05-10 | End: 2019-05-10 | Stop reason: HOSPADM

## 2019-05-10 RX ORDER — MAGNESIUM SULFATE HEPTAHYDRATE 40 MG/ML
4 INJECTION, SOLUTION INTRAVENOUS ONCE
Status: COMPLETED | OUTPATIENT
Start: 2019-05-10 | End: 2019-05-10

## 2019-05-10 RX ORDER — LISINOPRIL 2.5 MG/1
2.5 TABLET ORAL
Qty: 30 TABLET | Refills: 0 | Status: ON HOLD | OUTPATIENT
Start: 2019-05-11 | End: 2020-05-18

## 2019-05-10 RX ORDER — FUROSEMIDE 20 MG/1
20 TABLET ORAL DAILY PRN
Qty: 14 TABLET | Refills: 0 | Status: ON HOLD | OUTPATIENT
Start: 2019-05-10 | End: 2020-05-18

## 2019-05-10 RX ORDER — METOPROLOL TARTRATE 75 MG/1
37.5 TABLET, FILM COATED ORAL EVERY 12 HOURS SCHEDULED
Qty: 60 TABLET | Refills: 0 | Status: ON HOLD | OUTPATIENT
Start: 2019-05-10 | End: 2020-05-18

## 2019-05-10 RX ORDER — SPIRONOLACTONE 25 MG/1
12.5 TABLET ORAL DAILY
Status: DISCONTINUED | OUTPATIENT
Start: 2019-05-10 | End: 2019-05-10 | Stop reason: HOSPADM

## 2019-05-10 RX ORDER — AMIODARONE HYDROCHLORIDE 200 MG/1
400 TABLET ORAL DAILY
Qty: 60 TABLET | Refills: 0 | Status: SHIPPED | OUTPATIENT
Start: 2019-05-18 | End: 2019-05-24

## 2019-05-10 RX ORDER — CA/D3/MAG OX/ZINC/COP/MANG/BOR 600 MG-800
1 TABLET,CHEWABLE ORAL DAILY
Qty: 3 CAPSULE | Refills: 0 | Status: SHIPPED | OUTPATIENT
Start: 2019-05-10 | End: 2019-05-13

## 2019-05-10 RX ADMIN — LISINOPRIL 2.5 MG: 2.5 TABLET ORAL at 09:13

## 2019-05-10 RX ADMIN — GUAIFENESIN 600 MG: 600 TABLET, EXTENDED RELEASE ORAL at 09:13

## 2019-05-10 RX ADMIN — AMIODARONE HYDROCHLORIDE 400 MG: 200 TABLET ORAL at 09:13

## 2019-05-10 RX ADMIN — METOPROLOL TARTRATE 37.5 MG: 25 TABLET, FILM COATED ORAL at 09:13

## 2019-05-10 RX ADMIN — NICOTINE 1 PATCH: 21 PATCH, EXTENDED RELEASE TRANSDERMAL at 09:17

## 2019-05-10 RX ADMIN — MAGNESIUM SULFATE HEPTAHYDRATE 4 G: 40 INJECTION, SOLUTION INTRAVENOUS at 06:29

## 2019-05-10 RX ADMIN — ASPIRIN 81 MG: 81 TABLET, CHEWABLE ORAL at 09:13

## 2019-05-10 RX ADMIN — APIXABAN 5 MG: 5 TABLET, FILM COATED ORAL at 09:13

## 2019-05-10 RX ADMIN — AMOXICILLIN AND CLAVULANATE POTASSIUM 1 TABLET: 875; 125 TABLET, FILM COATED ORAL at 09:13

## 2019-05-10 NOTE — PHARMACY PATIENT ASSISTANCE
Pharmacy assessed the patient's co-pay of Eliquis and found it to be $3.80. There are no further issues anticipated.      Thanks,     Monico Macdonald, Pharmacy Intern  05/10/19  11:37 AM

## 2019-05-10 NOTE — PROGRESS NOTES
Smoking Cessation Consult    Pharmacy received a consult on smoking cessation. Upon discharge, the patient received nicotine patch prescriptions. Discussed with the patient about the nicotine replacement patches including side effects, duration of therapy, and other potential options if he isn't happy with this option later on. Asked the patient how if felt with the patches at this time from an inpatient aspect, he stated he hadn't had any issues or complaints at this time.    Thank you,  Natalia Willams PharmD  Pharmacy Resident    5/10/2019  4:25 PM
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
## 2019-05-10 NOTE — PROGRESS NOTES
Discharge Planning Assessment   [place]     Patient Name: Yared Mckeon  MRN: [id number]  Today's Date: 5/10/2019    Admit Date: 5/6/2019    Discharge Needs Assessment    No documentation.       Discharge Plan     Row Name 05/10/19 0945       Plan    Plan  CM follow up visit: Pt lying awake in bed, very pleasant and in good spirits. He underwent DARLING / CV yesterday and currently in NSR. Pt was initated on Amio.po & Eliquis. Pt reports he feels much better since admission. He has ambulated to restroom and [place] well.  Pt will return home at [place]. Sister Amber will likely provide transportation home at [place].  Pt praising staff for care received during his visit. Pt currently denies any needs. CM will follow.    Patient/Family in Agreement with Plan  yes        Destination      No service coordination in this encounter.      Durable Medical Equipment      No service coordination in this encounter.      Dialysis/Infusion      No service coordination in this encounter.      Home Medical Care      No service coordination in this encounter.      Therapy      No service coordination in this encounter.      Community Resources      No service coordination in this encounter.        Expected Discharge Date and Time     Expected Discharge Date Expected Discharge Time    May 9, 2019         Demographic Summary    No documentation.       Functional Status    No documentation.       Psychosocial    No documentation.       Abuse/Neglect    No documentation.       Legal    No documentation.       Substance Abuse    No documentation.       Patient Forms    No documentation.           Stephanie Elkins RN
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
## 2019-05-10 NOTE — PLAN OF CARE
Problem: Fall Risk (Adult)  Goal: Identify Related Risk Factors and Signs and Symptoms  Outcome: Ongoing (interventions implemented as appropriate)      Problem: Cardiac Output Decreased (Adult)  Goal: Effective Tissue Perfusion  Outcome: Ongoing (interventions implemented as appropriate)

## 2019-05-11 ENCOUNTER — READMISSION MANAGEMENT (OUTPATIENT)
Dept: CALL CENTER | Facility: HOSPITAL | Age: 69
End: 2019-05-11

## 2019-05-11 LAB
BACTERIA SPEC AEROBE CULT: NORMAL
BACTERIA SPEC AEROBE CULT: NORMAL

## 2019-05-11 NOTE — OUTREACH NOTE
Prep Survey      Responses   Facility patient discharged from?  [facility]   Is patient eligible?  Yes   Discharge diagnosis  AFIB   Does the patient have one of the following disease processes/diagnoses(primary or secondary)?  Other   Does the patient have Home health ordered?  No   Is there a DME ordered?  No   Comments regarding appointments  see AVS   Medication alerts for this patient  pacerone, eliquis, lasix, zestril, lopressor, aldactone   Prep survey completed?  Yes          Jaja Hui RN

## 2019-05-13 ENCOUNTER — READMISSION MANAGEMENT (OUTPATIENT)
Dept: CALL CENTER | Facility: HOSPITAL | Age: 69
End: 2019-05-13

## 2019-05-13 NOTE — OUTREACH NOTE
Medical Week 1 Survey      Responses   Facility patient discharged from?  [place]   Does the patient have one of the following disease processes/diagnoses(primary or secondary)?  Other   Is there a successful TCM telephone encounter documented?  No   Week 1 attempt successful?  Yes   Call start time  1008   Call end time  1016   Discharge diagnosis  AFIB   Medication alerts for this patient  pacerone, eliquis, lasix, zestril, lopressor, aldactone   Meds reviewed with patient/caregiver?  Yes   Is the patient having any side effects they believe may be caused by any medication additions or changes?  Yes   Side effects comments   Feeling alot of fatigue   Does the patient have all medications ordered at discharge?  Yes   Is the patient taking all medications as directed (includes completed medication regime)?  No   What is preventing the patient from taking all medications as directed?  Other   Nursing Interventions  Nurse provided patient education, Advised patient to call provider   Medication comments  Pt states he is tired. He is not using the nicotine patches. He is back to smoking 1 PPD after he got home from the hospital. I have also enc him to check his HR and B/P with his machine.   Does the patient have a primary care provider?   Yes   Does the patient have an appointment with their PCP within 7 days of discharge?  Yes   Has the patient kept scheduled appointments due by today?  N/A   Has home health visited the patient within 72 hours of discharge?  N/A   Psychosocial issues?  No   Did the patient receive a copy of their discharge instructions?  Yes   Nursing interventions  Reviewed instructions with patient   What is the patient's perception of their health status since discharge?  Same   Is the patient/caregiver able to teach back signs and symptoms related to disease process for when to call PCP?  Yes   Is the patient/caregiver able to teach back signs and symptoms related to disease process for when to  call 911?  Yes   Is the patient/caregiver able to teach back the hierarchy of who to call/visit for symptoms/problems? PCP, Specialist, Home health nurse, Urgent Care, ED, 911  Yes   If the patient is a current smoker, are they able to teach back resources for cessation?  Smoking cessation medications   Additional teach back comments  Enc pt to stop smoking and also follow his 1500 cc fluid restriction, avoid caffeine and monitor his B/P and HR. Also enc. pt to weigh daily to avoid fluid overload.    Week 1 call completed?  Yes          Letty Sterling RN

## 2019-05-21 ENCOUNTER — READMISSION MANAGEMENT (OUTPATIENT)
Dept: CALL CENTER | Facility: HOSPITAL | Age: 69
End: 2019-05-21

## 2019-05-21 NOTE — OUTREACH NOTE
Medical Week 2 Survey      Responses   Facility patient discharged from?  [place]   Does the patient have one of the following disease processes/diagnoses(primary or secondary)?  Other [CHF and AFib]   Week 2 attempt successful?  Yes   Call start time  1719   Discharge diagnosis  AFIB,  CHF   Call end time  1734   Is patient permission given to speak with other caregiver?  No   Meds reviewed with patient/caregiver?  Yes   Is the patient having any side effects they believe may be caused by any medication additions or changes?  Yes [patient reports they upset his stomach. He reported upset stomach to PCP. ]   Does the patient have all medications ordered at discharge?  Yes   Is the patient taking all medications as directed (includes completed medication regime)?  Yes   Does the patient have a primary care provider?   Yes   Comments regarding PCP  Tiera Tan-has seen since D/C   Has the patient kept scheduled appointments due by today?  Yes   Comments  Patient can teach back cardio appointment on 6/3/19   Psychosocial issues?  No   Did the patient receive a copy of their discharge instructions?  Yes   Nursing interventions  Reviewed instructions with patient   What is the patient's perception of their health status since discharge?  Improving   Is the patient/caregiver able to teach back signs and symptoms related to disease process for when to call PCP?  Yes   Is the patient/caregiver able to teach back signs and symptoms related to disease process for when to call 911?  Yes   Is the patient/caregiver able to teach back the hierarchy of who to call/visit for symptoms/problems? PCP, Specialist, Home health nurse, Urgent Care, ED, 911  Yes   Additional teach back comments  Advised patient to keep a daily written log of daily wt, BP, and HR to take to all healthcare appointments. Advised patient to contact MD with wt gain >2 lbs per day and or >5 lbs per week   Week 2 Call Completed?  Yes          Stormy Martin,  RN

## 2019-05-22 ENCOUNTER — TELEPHONE (OUTPATIENT)
Dept: CARDIOLOGY | Facility: CLINIC | Age: 69
End: 2019-05-22

## 2019-05-22 NOTE — TELEPHONE ENCOUNTER
Patient's PCP, Tiera Tan, called.  Pt was in her office and his heart rate was in the 40s.  He was complaining of SOA, fatigue.  She stated that he was seen by her one week ago and his heart rate was in the 90s and he was in a fib.      She sent the EKGs to our office.  They are difficult to read,but corroborate her account.      Dr Cheek stated that he would see the patient on Friday at 11 am.      I advised Tiera that Dr Lynne would see Yared on Friday.  I asked her to check with Yared regarding his amiodarone.  He should be on 200 mg daily.  I also advised her to have him check his pulse before taking metoprolol.  If his pulse is 60 or less he should not take his dose of metoprolol.

## 2019-05-24 ENCOUNTER — OFFICE VISIT (OUTPATIENT)
Dept: CARDIOLOGY | Facility: CLINIC | Age: 69
End: 2019-05-24

## 2019-05-24 VITALS
OXYGEN SATURATION: 95 % | HEIGHT: 67 IN | BODY MASS INDEX: 24.55 KG/M2 | HEART RATE: 65 BPM | WEIGHT: 156.4 LBS | DIASTOLIC BLOOD PRESSURE: 82 MMHG | SYSTOLIC BLOOD PRESSURE: 111 MMHG

## 2019-05-24 DIAGNOSIS — I48.19 PERSISTENT ATRIAL FIBRILLATION (HCC): Primary | ICD-10-CM

## 2019-05-24 PROCEDURE — 93005 ELECTROCARDIOGRAM TRACING: CPT | Performed by: INTERNAL MEDICINE

## 2019-05-24 PROCEDURE — 99214 OFFICE O/P EST MOD 30 MIN: CPT | Performed by: INTERNAL MEDICINE

## 2019-05-24 RX ORDER — AMIODARONE HYDROCHLORIDE 200 MG/1
400 TABLET ORAL DAILY
COMMUNITY
End: 2019-05-24 | Stop reason: SDUPTHER
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
## 2019-05-26 RX ORDER — AMIODARONE HYDROCHLORIDE 200 MG/1
400 TABLET ORAL DAILY
Qty: 60 TABLET | Refills: 3 | Status: ON HOLD | OUTPATIENT
Start: 2019-05-26 | End: 2020-05-18

## 2019-05-30 ENCOUNTER — READMISSION MANAGEMENT (OUTPATIENT)
Dept: CALL CENTER | Facility: HOSPITAL | Age: 69
End: 2019-05-30

## 2019-05-30 NOTE — OUTREACH NOTE
Medical Week 3 Survey      Responses   Facility patient discharged from?  [place]   Does the patient have one of the following disease processes/diagnoses(primary or secondary)?  Other   Week 3 attempt successful?  Yes   Call start time  1700   Call end time  1702   Discharge diagnosis  AFIB,  CHF   Medication alerts for this patient  pacerone, eliquis, lasix, zestril, lopressor, aldactone   Meds reviewed with patient/caregiver?  Yes   Is the patient having any side effects they believe may be caused by any medication additions or changes?  Yes   Does the patient have all medications ordered at discharge?  Yes   Is the patient taking all medications as directed (includes completed medication regime)?  Yes   Medication comments  He is still smoking.   Does the patient have a primary care provider?   Yes   Does the patient have an appointment with their PCP within 7 days of discharge?  Yes   Has the patient kept scheduled appointments due by today?  Yes   Has home health visited the patient within 72 hours of discharge?  N/A   Psychosocial issues?  No   Did the patient receive a copy of their discharge instructions?  Yes   Nursing interventions  Reviewed instructions with patient   What is the patient's perception of their health status since discharge?  Improving   Is the patient/caregiver able to teach back signs and symptoms related to disease process for when to call PCP?  Yes   Is the patient/caregiver able to teach back signs and symptoms related to disease process for when to call 911?  Yes   Is the patient/caregiver able to teach back the hierarchy of who to call/visit for symptoms/problems? PCP, Specialist, Home health nurse, Urgent Care, ED, 911  Yes   If the patient is a current smoker, are they able to teach back resources for cessation?  Smoking cessation medications   Additional teach back comments  Weight is staying the same he says.   Week 3 Call Completed?  Yes   Graduated  Yes   Did the patient feel  the follow up calls were helpful during their recovery period?  Yes   Was the number of calls appropriate?  Yes          Vanessa Obregon RN

## 2019-07-12 ENCOUNTER — CONSULT (OUTPATIENT)
Dept: CARDIOLOGY | Facility: CLINIC | Age: 69
End: 2019-07-12

## 2019-07-12 VITALS
OXYGEN SATURATION: 96 % | HEIGHT: 67 IN | BODY MASS INDEX: 24.64 KG/M2 | SYSTOLIC BLOOD PRESSURE: 124 MMHG | HEART RATE: 49 BPM | DIASTOLIC BLOOD PRESSURE: 74 MMHG | WEIGHT: 157 LBS

## 2019-07-12 DIAGNOSIS — Z79.899 LONG TERM CURRENT USE OF ANTIARRHYTHMIC MEDICAL THERAPY: ICD-10-CM

## 2019-07-12 DIAGNOSIS — I49.5 TACHY-BRADY SYNDROME (HCC): ICD-10-CM

## 2019-07-12 DIAGNOSIS — I50.22 CHRONIC SYSTOLIC CONGESTIVE HEART FAILURE (HCC): ICD-10-CM

## 2019-07-12 DIAGNOSIS — I48.0 PAROXYSMAL ATRIAL FIBRILLATION (HCC): Primary | ICD-10-CM

## 2019-07-12 PROCEDURE — 93000 ELECTROCARDIOGRAM COMPLETE: CPT | Performed by: INTERNAL MEDICINE

## 2019-07-12 PROCEDURE — 99204 OFFICE O/P NEW MOD 45 MIN: CPT | Performed by: INTERNAL MEDICINE
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
## 2019-07-16 PROBLEM — I49.5 TACHY-BRADY SYNDROME: Status: ACTIVE | Noted: 2019-07-16

## 2019-07-16 PROBLEM — Z79.899 LONG TERM CURRENT USE OF ANTIARRHYTHMIC MEDICAL THERAPY: Status: ACTIVE | Noted: 2019-07-16

## 2020-05-18 ENCOUNTER — APPOINTMENT (OUTPATIENT)
Dept: GENERAL RADIOLOGY | Facility: HOSPITAL | Age: 70
End: 2020-05-18

## 2020-05-18 ENCOUNTER — HOSPITAL ENCOUNTER (INPATIENT)
Facility: HOSPITAL | Age: 70
LOS: 4 days | Discharge: HOME | End: 2020-05-22
Attending: EMERGENCY MEDICINE | Admitting: INTERNAL MEDICINE

## 2020-05-18 ENCOUNTER — APPOINTMENT (OUTPATIENT)
Dept: CT IMAGING | Facility: HOSPITAL | Age: 70
End: 2020-05-18

## 2020-05-18 DIAGNOSIS — I48.91 ATRIAL FIBRILLATION WITH RVR (HCC): Primary | ICD-10-CM

## 2020-05-18 DIAGNOSIS — I50.9 ACUTE CONGESTIVE HEART FAILURE, UNSPECIFIED HEART FAILURE TYPE (HCC): ICD-10-CM

## 2020-05-18 LAB
A-A DO2: 27.6 MMHG (ref 0–300)
A-A DO2: 63.4 MMHG (ref 0–300)
ALBUMIN SERPL-MCNC: 3.52 G/DL (ref 3.5–5.2)
ALBUMIN/GLOB SERPL: 1.3 G/DL
ALP SERPL-CCNC: 142 U/L (ref 39–117)
ALT SERPL W P-5'-P-CCNC: 59 U/L (ref 1–41)
ANION GAP SERPL CALCULATED.3IONS-SCNC: 14.5 MMOL/L (ref 5–15)
APTT PPP: 33.8 SECONDS (ref 23.8–36.1)
APTT PPP: 67.5 SECONDS (ref 23.8–36.1)
ARTERIAL PATENCY WRIST A: ABNORMAL
ARTERIAL PATENCY WRIST A: POSITIVE
AST SERPL-CCNC: 58 U/L (ref 1–40)
ATMOSPHERIC PRESS: 720 MMHG
ATMOSPHERIC PRESS: 721 MMHG
BASE EXCESS BLDA CALC-SCNC: -1.3 MMOL/L (ref 0–2)
BASE EXCESS BLDA CALC-SCNC: -5.8 MMOL/L (ref 0–2)
BASOPHILS # BLD AUTO: 0.07 10*3/MM3 (ref 0–0.2)
BASOPHILS NFR BLD AUTO: 0.9 % (ref 0–1.5)
BDY SITE: ABNORMAL
BDY SITE: ABNORMAL
BILIRUB SERPL-MCNC: 2.5 MG/DL (ref 0.2–1.2)
BODY TEMPERATURE: 0 C
BODY TEMPERATURE: 0 C
BUN BLD-MCNC: 23 MG/DL (ref 8–23)
BUN/CREAT SERPL: 21.7 (ref 7–25)
CALCIUM SPEC-SCNC: 9.1 MG/DL (ref 8.6–10.5)
CHLORIDE SERPL-SCNC: 103 MMOL/L (ref 98–107)
CO2 BLDA-SCNC: 17.5 MMOL/L (ref 22–33)
CO2 BLDA-SCNC: 22.5 MMOL/L (ref 22–33)
CO2 SERPL-SCNC: 21.5 MMOL/L (ref 22–29)
COHGB MFR BLD: 1.7 % (ref 0–5)
COHGB MFR BLD: 2.1 % (ref 0–5)
CREAT BLD-MCNC: 1.06 MG/DL (ref 0.76–1.27)
D-LACTATE SERPL-SCNC: 1.6 MMOL/L (ref 0.5–2)
DEPRECATED RDW RBC AUTO: 53.7 FL (ref 37–54)
EOSINOPHIL # BLD AUTO: 0.1 10*3/MM3 (ref 0–0.4)
EOSINOPHIL NFR BLD AUTO: 1.2 % (ref 0.3–6.2)
ERYTHROCYTE [DISTWIDTH] IN BLOOD BY AUTOMATED COUNT: 15.2 % (ref 12.3–15.4)
GFR SERPL CREATININE-BSD FRML MDRD: 69 ML/MIN/1.73
GLOBULIN UR ELPH-MCNC: 2.8 GM/DL
GLUCOSE BLD-MCNC: 101 MG/DL (ref 65–99)
HCO3 BLDA-SCNC: 16.7 MMOL/L (ref 20–26)
HCO3 BLDA-SCNC: 21.6 MMOL/L (ref 20–26)
HCT VFR BLD AUTO: 47.8 % (ref 37.5–51)
HCT VFR BLD CALC: 46.4 % (ref 38–51)
HCT VFR BLD CALC: 47 % (ref 38–51)
HGB BLD-MCNC: 15.6 G/DL (ref 13–17.7)
HGB BLDA-MCNC: 15.1 G/DL (ref 14–18)
HGB BLDA-MCNC: 15.3 G/DL (ref 14–18)
HOLD SPECIMEN: NORMAL
HOLD SPECIMEN: NORMAL
HOROWITZ INDEX BLD+IHG-RTO: 21 %
HOROWITZ INDEX BLD+IHG-RTO: 28 %
IMM GRANULOCYTES # BLD AUTO: 0.06 10*3/MM3 (ref 0–0.05)
IMM GRANULOCYTES NFR BLD AUTO: 0.7 % (ref 0–0.5)
INR PPP: 1.1 (ref 0.9–1.1)
LYMPHOCYTES # BLD AUTO: 2.09 10*3/MM3 (ref 0.7–3.1)
LYMPHOCYTES NFR BLD AUTO: 26 % (ref 19.6–45.3)
Lab: ABNORMAL
Lab: ABNORMAL
MAGNESIUM SERPL-MCNC: 1.9 MG/DL (ref 1.6–2.4)
MCH RBC QN AUTO: 31.7 PG (ref 26.6–33)
MCHC RBC AUTO-ENTMCNC: 32.6 G/DL (ref 31.5–35.7)
MCV RBC AUTO: 97.2 FL (ref 79–97)
METHGB BLD QL: 0 % (ref 0–3)
METHGB BLD QL: 0.1 % (ref 0–3)
MODALITY: ABNORMAL
MODALITY: ABNORMAL
MONOCYTES # BLD AUTO: 1.55 10*3/MM3 (ref 0.1–0.9)
MONOCYTES NFR BLD AUTO: 19.3 % (ref 5–12)
NEUTROPHILS # BLD AUTO: 4.18 10*3/MM3 (ref 1.7–7)
NEUTROPHILS NFR BLD AUTO: 51.9 % (ref 42.7–76)
NOTE: ABNORMAL
NOTE: ABNORMAL
NOTIFIED BY: ABNORMAL
NOTIFIED WHO: ABNORMAL
NRBC BLD AUTO-RTO: 0 /100 WBC (ref 0–0.2)
NT-PROBNP SERPL-MCNC: ABNORMAL PG/ML (ref 5–900)
OXYHGB MFR BLDV: 93.7 % (ref 94–99)
OXYHGB MFR BLDV: 95.9 % (ref 94–99)
PCO2 BLDA: 25.5 MM HG (ref 35–45)
PCO2 BLDA: 30.9 MM HG (ref 35–45)
PCO2 TEMP ADJ BLD: ABNORMAL MM[HG]
PCO2 TEMP ADJ BLD: ABNORMAL MM[HG]
PH BLDA: 7.42 PH UNITS (ref 7.35–7.45)
PH BLDA: 7.45 PH UNITS (ref 7.35–7.45)
PH, TEMP CORRECTED: ABNORMAL
PH, TEMP CORRECTED: ABNORMAL
PLATELET # BLD AUTO: 78 10*3/MM3 (ref 140–450)
PMV BLD AUTO: 14.6 FL (ref 6–12)
PO2 BLDA: 79.1 MM HG (ref 83–108)
PO2 BLDA: 96.7 MM HG (ref 83–108)
PO2 TEMP ADJ BLD: ABNORMAL MM[HG]
PO2 TEMP ADJ BLD: ABNORMAL MM[HG]
POTASSIUM BLD-SCNC: 4.3 MMOL/L (ref 3.5–5.2)
PROT SERPL-MCNC: 6.3 G/DL (ref 6–8.5)
PROTHROMBIN TIME: 14.7 SECONDS (ref 11–15.4)
RBC # BLD AUTO: 4.92 10*6/MM3 (ref 4.14–5.8)
SAO2 % BLDCOA: 95.8 % (ref 94–99)
SAO2 % BLDCOA: 97.6 % (ref 94–99)
SODIUM BLD-SCNC: 139 MMOL/L (ref 136–145)
TROPONIN T SERPL-MCNC: <0.01 NG/ML (ref 0–0.03)
VENTILATOR MODE: ABNORMAL
VENTILATOR MODE: ABNORMAL
WBC NRBC COR # BLD: 8.05 10*3/MM3 (ref 3.4–10.8)
WHOLE BLOOD HOLD SPECIMEN: NORMAL
WHOLE BLOOD HOLD SPECIMEN: NORMAL

## 2020-05-18 PROCEDURE — 71275 CT ANGIOGRAPHY CHEST: CPT | Performed by: RADIOLOGY

## 2020-05-18 PROCEDURE — 80053 COMPREHEN METABOLIC PANEL: CPT | Performed by: EMERGENCY MEDICINE

## 2020-05-18 PROCEDURE — 99223 1ST HOSP IP/OBS HIGH 75: CPT | Performed by: INTERNAL MEDICINE

## 2020-05-18 PROCEDURE — 94640 AIRWAY INHALATION TREATMENT: CPT

## 2020-05-18 PROCEDURE — 25010000002 AZITHROMYCIN 500 MG/250 ML: Performed by: EMERGENCY MEDICINE

## 2020-05-18 PROCEDURE — 71045 X-RAY EXAM CHEST 1 VIEW: CPT | Performed by: RADIOLOGY

## 2020-05-18 PROCEDURE — 25010000002 CEFTRIAXONE PER 250 MG: Performed by: EMERGENCY MEDICINE

## 2020-05-18 PROCEDURE — 85025 COMPLETE CBC W/AUTO DIFF WBC: CPT | Performed by: EMERGENCY MEDICINE

## 2020-05-18 PROCEDURE — 94799 UNLISTED PULMONARY SVC/PX: CPT

## 2020-05-18 PROCEDURE — 87040 BLOOD CULTURE FOR BACTERIA: CPT | Performed by: EMERGENCY MEDICINE

## 2020-05-18 PROCEDURE — 84484 ASSAY OF TROPONIN QUANT: CPT | Performed by: INTERNAL MEDICINE

## 2020-05-18 PROCEDURE — 71275 CT ANGIOGRAPHY CHEST: CPT

## 2020-05-18 PROCEDURE — 93010 ELECTROCARDIOGRAM REPORT: CPT | Performed by: INTERNAL MEDICINE

## 2020-05-18 PROCEDURE — 99284 EMERGENCY DEPT VISIT MOD MDM: CPT

## 2020-05-18 PROCEDURE — 25010000002 HEPARIN (PORCINE) PER 1000 UNITS: Performed by: EMERGENCY MEDICINE

## 2020-05-18 PROCEDURE — 83050 HGB METHEMOGLOBIN QUAN: CPT

## 2020-05-18 PROCEDURE — 83880 ASSAY OF NATRIURETIC PEPTIDE: CPT | Performed by: EMERGENCY MEDICINE

## 2020-05-18 PROCEDURE — 36600 WITHDRAWAL OF ARTERIAL BLOOD: CPT

## 2020-05-18 PROCEDURE — 83605 ASSAY OF LACTIC ACID: CPT | Performed by: EMERGENCY MEDICINE

## 2020-05-18 PROCEDURE — 0 IOVERSOL 74 % SOLUTION: Performed by: EMERGENCY MEDICINE

## 2020-05-18 PROCEDURE — 71045 X-RAY EXAM CHEST 1 VIEW: CPT

## 2020-05-18 PROCEDURE — 85730 THROMBOPLASTIN TIME PARTIAL: CPT | Performed by: INTERNAL MEDICINE

## 2020-05-18 PROCEDURE — 83735 ASSAY OF MAGNESIUM: CPT | Performed by: INTERNAL MEDICINE

## 2020-05-18 PROCEDURE — 82375 ASSAY CARBOXYHB QUANT: CPT

## 2020-05-18 PROCEDURE — 82805 BLOOD GASES W/O2 SATURATION: CPT

## 2020-05-18 PROCEDURE — 85730 THROMBOPLASTIN TIME PARTIAL: CPT | Performed by: EMERGENCY MEDICINE

## 2020-05-18 PROCEDURE — 93005 ELECTROCARDIOGRAM TRACING: CPT | Performed by: EMERGENCY MEDICINE

## 2020-05-18 PROCEDURE — 85610 PROTHROMBIN TIME: CPT | Performed by: EMERGENCY MEDICINE

## 2020-05-18 PROCEDURE — 84484 ASSAY OF TROPONIN QUANT: CPT | Performed by: EMERGENCY MEDICINE

## 2020-05-18 RX ORDER — SODIUM CHLORIDE 9 MG/ML
125 INJECTION, SOLUTION INTRAVENOUS CONTINUOUS
Status: DISCONTINUED | OUTPATIENT
Start: 2020-05-18 | End: 2020-05-18

## 2020-05-18 RX ORDER — HEPARIN SODIUM 5000 [USP'U]/ML
30 INJECTION, SOLUTION INTRAVENOUS; SUBCUTANEOUS AS NEEDED
Status: DISCONTINUED | OUTPATIENT
Start: 2020-05-18 | End: 2020-05-19

## 2020-05-18 RX ORDER — SODIUM CHLORIDE 0.9 % (FLUSH) 0.9 %
10 SYRINGE (ML) INJECTION AS NEEDED
Status: DISCONTINUED | OUTPATIENT
Start: 2020-05-18 | End: 2020-05-22 | Stop reason: HOSPADM

## 2020-05-18 RX ORDER — HEPARIN SODIUM 10000 [USP'U]/100ML
12 INJECTION, SOLUTION INTRAVENOUS
Status: DISCONTINUED | OUTPATIENT
Start: 2020-05-18 | End: 2020-05-19

## 2020-05-18 RX ORDER — HEPARIN SODIUM 5000 [USP'U]/ML
60 INJECTION, SOLUTION INTRAVENOUS; SUBCUTANEOUS ONCE
Status: COMPLETED | OUTPATIENT
Start: 2020-05-18 | End: 2020-05-18

## 2020-05-18 RX ORDER — SODIUM CHLORIDE 0.9 % (FLUSH) 0.9 %
10 SYRINGE (ML) INJECTION EVERY 12 HOURS SCHEDULED
Status: DISCONTINUED | OUTPATIENT
Start: 2020-05-18 | End: 2020-05-22 | Stop reason: HOSPADM

## 2020-05-18 RX ORDER — FUROSEMIDE 10 MG/ML
40 INJECTION INTRAMUSCULAR; INTRAVENOUS ONCE
Status: DISCONTINUED | OUTPATIENT
Start: 2020-05-18 | End: 2020-05-18

## 2020-05-18 RX ORDER — PANTOPRAZOLE SODIUM 40 MG/1
40 TABLET, DELAYED RELEASE ORAL
Status: DISCONTINUED | OUTPATIENT
Start: 2020-05-19 | End: 2020-05-22 | Stop reason: HOSPADM

## 2020-05-18 RX ORDER — ASPIRIN 81 MG/1
324 TABLET, CHEWABLE ORAL ONCE
Status: COMPLETED | OUTPATIENT
Start: 2020-05-18 | End: 2020-05-18

## 2020-05-18 RX ORDER — HEPARIN SODIUM 5000 [USP'U]/ML
60 INJECTION, SOLUTION INTRAVENOUS; SUBCUTANEOUS AS NEEDED
Status: DISCONTINUED | OUTPATIENT
Start: 2020-05-18 | End: 2020-05-19

## 2020-05-18 RX ADMIN — IOVERSOL 100 ML: 741 INJECTION INTRA-ARTERIAL; INTRAVENOUS at 16:17

## 2020-05-18 RX ADMIN — ASPIRIN 324 MG: 81 TABLET, CHEWABLE ORAL at 15:11

## 2020-05-18 RX ADMIN — AZITHROMYCIN MONOHYDRATE 500 MG: 500 INJECTION, POWDER, LYOPHILIZED, FOR SOLUTION INTRAVENOUS at 14:32

## 2020-05-18 RX ADMIN — SODIUM CHLORIDE, PRESERVATIVE FREE 10 ML: 5 INJECTION INTRAVENOUS at 20:46

## 2020-05-18 RX ADMIN — CEFTRIAXONE 1 G: 1 INJECTION, POWDER, FOR SOLUTION INTRAMUSCULAR; INTRAVENOUS at 13:49

## 2020-05-18 RX ADMIN — SODIUM CHLORIDE 5 MG/HR: 900 INJECTION, SOLUTION INTRAVENOUS at 13:28

## 2020-05-18 RX ADMIN — IPRATROPIUM BROMIDE 0.5 MG: 0.5 SOLUTION RESPIRATORY (INHALATION) at 21:25

## 2020-05-18 RX ADMIN — HEPARIN SODIUM 12 UNITS/KG/HR: 10000 INJECTION, SOLUTION INTRAVENOUS at 16:35

## 2020-05-18 RX ADMIN — SODIUM CHLORIDE 1000 ML: 9 INJECTION, SOLUTION INTRAVENOUS at 14:32

## 2020-05-18 RX ADMIN — SODIUM CHLORIDE 125 ML/HR: 9 INJECTION, SOLUTION INTRAVENOUS at 14:32

## 2020-05-18 RX ADMIN — HEPARIN SODIUM 4200 UNITS: 5000 INJECTION INTRAVENOUS; SUBCUTANEOUS at 16:33

## 2020-05-18 NOTE — PROGRESS NOTES
Discharge Planning Assessment   [place]     Patient Name: Yared Mckeon  MRN: [id number]  Today's Date: 5/18/2020    Admit Date: 5/18/2020    Discharge Needs Assessment     Row Name 05/18/20 1529       Living Environment    Lives With  spouse    Name(s) of Who Lives With Patient  lives with spouse Ludmila    Current Living Arrangements  home/apartment/condo    Primary Care Provided by  self    Family Caregiver if Needed  spouse    Family Caregiver Names  spouse Ludmila    Quality of Family Relationships  helpful;involved;supportive    Able to Return to Prior Arrangements  yes       Resource/Environmental Concerns    Resource/Environmental Concerns  none       Transition Planning    Patient/Family Anticipates Transition to  home with family    Patient/Family Anticipated Services at Transition  none    Transportation Anticipated  family or friend will provide       Discharge Needs Assessment    Readmission Within the Last 30 Days  no previous admission in last 30 days    Concerns to be Addressed  no discharge needs identified;denies needs/concerns at this time    Equipment Currently Used at Home  none    Anticipated Changes Related to Illness  none    Equipment Needed After Discharge  none        Discharge Plan     Row Name 05/18/20 1529       Plan    Plan  Pt lives at home with spouse Ludmila who is at bedside and plans to return home upon discharge, family to provide transportation. Pcp is Tiera Tan, he uses [facility] and has "[employer] Medicare Replacement. Pt denies any issues with meds or copays. Pt does not have a poa or advanced directives. Pt is independent with adl's and does not use any dme's, home health or home o2. No needs identifed at this time.    Patient/Family in Agreement with Plan  yes        Destination      Coordination has not been started for this encounter.      Durable Medical Equipment      Coordination has not been started for this encounter.      Dialysis/Infusion      Coordination has not  been started for this encounter.      Home Medical Care      Coordination has not been started for this encounter.      Therapy      Coordination has not been started for this encounter.      Community Resources      Coordination has not been started for this encounter.          Demographic Summary     Row Name 05/18/20 1528       General Information    Admission Type  inpatient    Arrived From  home    Referral Source  emergency department    Reason for Consult  discharge planning    Preferred Language  English        Functional Status     Row Name 05/18/20 1528       Functional Status    Usual Activity Tolerance  moderate    Current Activity Tolerance  moderate       Mental Status    General Appearance WDL  WDL        Psychosocial     Row Name 05/18/20 1529       Behavior WDL    Behavior WDL  WDL       Emotion Mood WDL    Emotion/Mood/Affect WDL  WDL       Speech WDL    Speech WDL  WDL        Abuse/Neglect    No documentation.       Legal    No documentation.       Substance Abuse    No documentation.       Patient Forms    No documentation.           Twila Avelar RN
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
## 2020-05-19 ENCOUNTER — APPOINTMENT (OUTPATIENT)
Dept: CARDIOLOGY | Facility: HOSPITAL | Age: 70
End: 2020-05-19

## 2020-05-19 LAB
ALBUMIN SERPL-MCNC: 3.24 G/DL (ref 3.5–5.2)
ALBUMIN/GLOB SERPL: 1.3 G/DL
ALP SERPL-CCNC: 133 U/L (ref 39–117)
ALT SERPL W P-5'-P-CCNC: 54 U/L (ref 1–41)
ANION GAP SERPL CALCULATED.3IONS-SCNC: 14.2 MMOL/L (ref 5–15)
ANISOCYTOSIS BLD QL: ABNORMAL
APTT PPP: 52.6 SECONDS (ref 23.8–36.1)
AST SERPL-CCNC: 52 U/L (ref 1–40)
BH CV ECHO MEAS - % IVS THICK: 38.4 %
BH CV ECHO MEAS - % LVPW THICK: 74.2 %
BH CV ECHO MEAS - ACS: 2.2 CM
BH CV ECHO MEAS - AI DEC SLOPE: 197.5 CM/SEC^2
BH CV ECHO MEAS - AI MAX PG: 45 MMHG
BH CV ECHO MEAS - AI MAX VEL: 335 CM/SEC
BH CV ECHO MEAS - AI P1/2T: 496.8 MSEC
BH CV ECHO MEAS - AO ROOT AREA (BSA CORRECTED): 1.7
BH CV ECHO MEAS - AO ROOT AREA: 8 CM^2
BH CV ECHO MEAS - AO ROOT DIAM: 3.2 CM
BH CV ECHO MEAS - BSA(HAYCOCK): 1.8 M^2
BH CV ECHO MEAS - BSA: 1.8 M^2
BH CV ECHO MEAS - BZI_BMI: 24.7 KILOGRAMS/M^2
BH CV ECHO MEAS - BZI_METRIC_HEIGHT: 170.2 CM
BH CV ECHO MEAS - BZI_METRIC_WEIGHT: 71.7 KG
BH CV ECHO MEAS - EDV(CUBED): 287.5 ML
BH CV ECHO MEAS - EDV(MOD-SP4): 41.8 ML
BH CV ECHO MEAS - EDV(TEICH): 223.6 ML
BH CV ECHO MEAS - EF(CUBED): 53.6 %
BH CV ECHO MEAS - EF(MOD-SP4): 46.9 %
BH CV ECHO MEAS - EF(TEICH): 44.4 %
BH CV ECHO MEAS - ESV(CUBED): 133.4 ML
BH CV ECHO MEAS - ESV(MOD-SP4): 22.2 ML
BH CV ECHO MEAS - ESV(TEICH): 124.4 ML
BH CV ECHO MEAS - FS: 22.6 %
BH CV ECHO MEAS - IVS/LVPW: 0.79
BH CV ECHO MEAS - IVSD: 0.73 CM
BH CV ECHO MEAS - IVSS: 1 CM
BH CV ECHO MEAS - LA DIMENSION: 4.6 CM
BH CV ECHO MEAS - LA/AO: 1.4
BH CV ECHO MEAS - LV DIASTOLIC VOL/BSA (35-75): 22.9 ML/M^2
BH CV ECHO MEAS - LV MASS(C)D: 229.3 GRAMS
BH CV ECHO MEAS - LV MASS(C)DI: 125.3 GRAMS/M^2
BH CV ECHO MEAS - LV MASS(C)S: 273.9 GRAMS
BH CV ECHO MEAS - LV MASS(C)SI: 149.7 GRAMS/M^2
BH CV ECHO MEAS - LV SYSTOLIC VOL/BSA (12-30): 12.1 ML/M^2
BH CV ECHO MEAS - LVIDD: 6.6 CM
BH CV ECHO MEAS - LVIDS: 5.1 CM
BH CV ECHO MEAS - LVLD AP4: 5.5 CM
BH CV ECHO MEAS - LVLS AP4: 5.7 CM
BH CV ECHO MEAS - LVOT AREA (M): 3.5 CM^2
BH CV ECHO MEAS - LVOT AREA: 3.5 CM^2
BH CV ECHO MEAS - LVOT DIAM: 2.1 CM
BH CV ECHO MEAS - LVPWD: 0.92 CM
BH CV ECHO MEAS - LVPWS: 1.6 CM
BH CV ECHO MEAS - MV A MAX VEL: 38.1 CM/SEC
BH CV ECHO MEAS - MV E MAX VEL: 82.7 CM/SEC
BH CV ECHO MEAS - MV E/A: 2.2
BH CV ECHO MEAS - PA ACC TIME: 0.06 SEC
BH CV ECHO MEAS - PA PR(ACCEL): 52 MMHG
BH CV ECHO MEAS - RAP SYSTOLE: 10 MMHG
BH CV ECHO MEAS - RVDD: 1.2 CM
BH CV ECHO MEAS - RVSP: 30.3 MMHG
BH CV ECHO MEAS - SI(CUBED): 84.2 ML/M^2
BH CV ECHO MEAS - SI(MOD-SP4): 10.7 ML/M^2
BH CV ECHO MEAS - SI(TEICH): 54.2 ML/M^2
BH CV ECHO MEAS - SV(CUBED): 154.1 ML
BH CV ECHO MEAS - SV(MOD-SP4): 19.6 ML
BH CV ECHO MEAS - SV(TEICH): 99.2 ML
BH CV ECHO MEAS - TR MAX VEL: 225 CM/SEC
BILIRUB SERPL-MCNC: 1.9 MG/DL (ref 0.2–1.2)
BUN BLD-MCNC: 25 MG/DL (ref 8–23)
BUN/CREAT SERPL: 25.3 (ref 7–25)
CALCIUM SPEC-SCNC: 8.5 MG/DL (ref 8.6–10.5)
CHLORIDE SERPL-SCNC: 108 MMOL/L (ref 98–107)
CO2 SERPL-SCNC: 16.8 MMOL/L (ref 22–29)
CREAT BLD-MCNC: 0.99 MG/DL (ref 0.76–1.27)
DEPRECATED RDW RBC AUTO: 54.3 FL (ref 37–54)
EOSINOPHIL # BLD MANUAL: 0.13 10*3/MM3 (ref 0–0.4)
EOSINOPHIL NFR BLD MANUAL: 2 % (ref 0.3–6.2)
ERYTHROCYTE [DISTWIDTH] IN BLOOD BY AUTOMATED COUNT: 15.4 % (ref 12.3–15.4)
FOLATE SERPL-MCNC: 7.16 NG/ML (ref 4.78–24.2)
GFR SERPL CREATININE-BSD FRML MDRD: 75 ML/MIN/1.73
GLOBULIN UR ELPH-MCNC: 2.5 GM/DL
GLUCOSE BLD-MCNC: 121 MG/DL (ref 65–99)
GLUCOSE BLDC GLUCOMTR-MCNC: 140 MG/DL (ref 70–130)
HAV IGM SERPL QL IA: NORMAL
HBV CORE IGM SERPL QL IA: NORMAL
HBV SURFACE AG SERPL QL IA: NORMAL
HCT VFR BLD AUTO: 45.3 % (ref 37.5–51)
HCV AB SER DONR QL: NORMAL
HGB BLD-MCNC: 14.7 G/DL (ref 13–17.7)
HOLD SPECIMEN: NORMAL
LARGE PLATELETS: ABNORMAL
LYMPHOCYTES # BLD MANUAL: 1.5 10*3/MM3 (ref 0.7–3.1)
LYMPHOCYTES NFR BLD MANUAL: 17 % (ref 5–12)
LYMPHOCYTES NFR BLD MANUAL: 23 % (ref 19.6–45.3)
MACROCYTES BLD QL SMEAR: ABNORMAL
MAXIMAL PREDICTED HEART RATE: 151 BPM
MCH RBC QN AUTO: 31.5 PG (ref 26.6–33)
MCHC RBC AUTO-ENTMCNC: 32.5 G/DL (ref 31.5–35.7)
MCV RBC AUTO: 97 FL (ref 79–97)
MONOCYTES # BLD AUTO: 1.11 10*3/MM3 (ref 0.1–0.9)
NEUTROPHILS # BLD AUTO: 3.79 10*3/MM3 (ref 1.7–7)
NEUTROPHILS NFR BLD MANUAL: 58 % (ref 42.7–76)
PLATELET # BLD AUTO: 69 10*3/MM3 (ref 140–450)
PMV BLD AUTO: ABNORMAL FL
POTASSIUM BLD-SCNC: 4.7 MMOL/L (ref 3.5–5.2)
PROT SERPL-MCNC: 5.7 G/DL (ref 6–8.5)
RBC # BLD AUTO: 4.67 10*6/MM3 (ref 4.14–5.8)
SCAN SLIDE: NORMAL
SMALL PLATELETS BLD QL SMEAR: ABNORMAL
SODIUM BLD-SCNC: 139 MMOL/L (ref 136–145)
STRESS TARGET HR: 128 BPM
T4 FREE SERPL-MCNC: 1.24 NG/DL (ref 0.93–1.7)
VIT B12 BLD-MCNC: 610 PG/ML (ref 211–946)
WBC NRBC COR # BLD: 6.53 10*3/MM3 (ref 3.4–10.8)

## 2020-05-19 PROCEDURE — 99233 SBSQ HOSP IP/OBS HIGH 50: CPT | Performed by: INTERNAL MEDICINE

## 2020-05-19 PROCEDURE — 93005 ELECTROCARDIOGRAM TRACING: CPT | Performed by: INTERNAL MEDICINE

## 2020-05-19 PROCEDURE — 82962 GLUCOSE BLOOD TEST: CPT

## 2020-05-19 PROCEDURE — 85060 BLOOD SMEAR INTERPRETATION: CPT | Performed by: INTERNAL MEDICINE

## 2020-05-19 PROCEDURE — 25010000002 AMIODARONE IN DEXTROSE 5% 360-4.14 MG/200ML-% SOLUTION: Performed by: INTERNAL MEDICINE

## 2020-05-19 PROCEDURE — 94799 UNLISTED PULMONARY SVC/PX: CPT

## 2020-05-19 PROCEDURE — 80053 COMPREHEN METABOLIC PANEL: CPT | Performed by: INTERNAL MEDICINE

## 2020-05-19 PROCEDURE — 80074 ACUTE HEPATITIS PANEL: CPT | Performed by: INTERNAL MEDICINE

## 2020-05-19 PROCEDURE — 93306 TTE W/DOPPLER COMPLETE: CPT | Performed by: INTERNAL MEDICINE

## 2020-05-19 PROCEDURE — 85730 THROMBOPLASTIN TIME PARTIAL: CPT | Performed by: INTERNAL MEDICINE

## 2020-05-19 PROCEDURE — 85007 BL SMEAR W/DIFF WBC COUNT: CPT | Performed by: INTERNAL MEDICINE

## 2020-05-19 PROCEDURE — 25010000002 FUROSEMIDE PER 20 MG: Performed by: INTERNAL MEDICINE

## 2020-05-19 PROCEDURE — 25010000002 DIGOXIN PER 500 MCG: Performed by: INTERNAL MEDICINE

## 2020-05-19 PROCEDURE — 84439 ASSAY OF FREE THYROXINE: CPT | Performed by: INTERNAL MEDICINE

## 2020-05-19 PROCEDURE — 82607 VITAMIN B-12: CPT | Performed by: INTERNAL MEDICINE

## 2020-05-19 PROCEDURE — 93306 TTE W/DOPPLER COMPLETE: CPT

## 2020-05-19 PROCEDURE — 82746 ASSAY OF FOLIC ACID SERUM: CPT | Performed by: INTERNAL MEDICINE

## 2020-05-19 PROCEDURE — 85025 COMPLETE CBC W/AUTO DIFF WBC: CPT | Performed by: INTERNAL MEDICINE

## 2020-05-19 PROCEDURE — 99222 1ST HOSP IP/OBS MODERATE 55: CPT | Performed by: INTERNAL MEDICINE

## 2020-05-19 RX ORDER — FUROSEMIDE 10 MG/ML
20 INJECTION INTRAMUSCULAR; INTRAVENOUS ONCE
Status: COMPLETED | OUTPATIENT
Start: 2020-05-19 | End: 2020-05-19

## 2020-05-19 RX ORDER — FUROSEMIDE 10 MG/ML
40 INJECTION INTRAMUSCULAR; INTRAVENOUS EVERY 12 HOURS
Status: DISCONTINUED | OUTPATIENT
Start: 2020-05-19 | End: 2020-05-22 | Stop reason: HOSPADM

## 2020-05-19 RX ORDER — DIGOXIN 0.25 MG/ML
250 INJECTION INTRAMUSCULAR; INTRAVENOUS ONCE
Status: COMPLETED | OUTPATIENT
Start: 2020-05-19 | End: 2020-05-19

## 2020-05-19 RX ADMIN — AMIODARONE HYDROCHLORIDE 1 MG/MIN: 1.8 INJECTION, SOLUTION INTRAVENOUS at 13:11

## 2020-05-19 RX ADMIN — AMIODARONE HYDROCHLORIDE 1 MG/MIN: 1.8 INJECTION, SOLUTION INTRAVENOUS at 19:13

## 2020-05-19 RX ADMIN — PANTOPRAZOLE SODIUM 40 MG: 40 TABLET, DELAYED RELEASE ORAL at 05:00

## 2020-05-19 RX ADMIN — SODIUM CHLORIDE 5 MG/HR: 900 INJECTION, SOLUTION INTRAVENOUS at 04:51

## 2020-05-19 RX ADMIN — DIGOXIN 250 MCG: 0.25 INJECTION INTRAMUSCULAR; INTRAVENOUS at 19:19

## 2020-05-19 RX ADMIN — SODIUM CHLORIDE, PRESERVATIVE FREE 10 ML: 5 INJECTION INTRAVENOUS at 21:04

## 2020-05-19 RX ADMIN — APIXABAN 2.5 MG: 2.5 TABLET, FILM COATED ORAL at 13:46

## 2020-05-19 RX ADMIN — SODIUM CHLORIDE, PRESERVATIVE FREE 10 ML: 5 INJECTION INTRAVENOUS at 09:14

## 2020-05-19 RX ADMIN — APIXABAN 2.5 MG: 2.5 TABLET, FILM COATED ORAL at 21:03

## 2020-05-19 RX ADMIN — FUROSEMIDE 20 MG: 10 INJECTION, SOLUTION INTRAMUSCULAR; INTRAVENOUS at 09:13

## 2020-05-19 RX ADMIN — FUROSEMIDE 40 MG: 10 INJECTION, SOLUTION INTRAMUSCULAR; INTRAVENOUS at 21:03

## 2020-05-19 NOTE — PROGRESS NOTES
[facility] CCU Note    Patient Identification:  Name:  Yared Mckeon  Age:  69 y.o.  Sex:  male  :  1950  MRN:  [id number]  Visit number:  [id number]  Primary Care Provider:  Tiera Tan APRN    1    Drips: Cardizem, Heparin (to be discontinued)  Antibiotics: None  Lines: Peripheral  Acevedo: No    Procedures:  None    HPI:  68 yo male being seen in follow up for atrial fibrillation with RVR    Subjective     Patient seen and examined this morning. He is pleasant and resting in bed.     He continues to complain of shortness of breath which he states is slightly improved from admission. He reports cough that is exacerbated by the breathing treatment he received earlier. He denies chest pains; no report of palpitations.    He reports poor sleep last night and states that he does not sleep well at home.    He reports that he drinks approximately 3-4 shots of whiskey per month.     History taken from: chart RN   Patient unable to give history due to: N/A  Present during visit: ZAY Holman    Objective     Vital Signs  Temp:  [97.7 °F (36.5 °C)-98.1 °F (36.7 °C)] 97.9 °F (36.6 °C)  Heart Rate:  [] 94  Resp:  [11-20] 16  BP: ()/(42-95) 95/81  Body mass index is 24.75 kg/m².    Intake/Output Summary (Last 24 hours) at 2020 0826  Last data filed at 2020 0434  Gross per 24 hour   Intake 1762.87 ml   Output 850 ml   Net 912.87 ml       Physical Exam:    Physical Exam   Constitutional: He is oriented to person, place, and time. He appears well-developed and well-nourished. No distress.   HENT:   Head: Normocephalic and atraumatic.   Mouth/Throat: Oropharynx is clear and moist.   Eyes: Pupils are equal, round, and reactive to light. Conjunctivae and EOM are normal.   Neck: Neck supple. JVD present. No tracheal deviation present.   Cardiovascular: Normal rate. An irregularly irregular rhythm present. Exam reveals no gallop and no friction rub.   No murmur  heard.  Pulses:       Posterior tibial pulses are 2+ on the right side, and 2+ on the left side.   Pulmonary/Chest: Breath sounds normal. No respiratory distress. He has no wheezes. He has no rales.   Coarse bilateral breath sounds; improves upon coughing.   Abdominal: Soft. Bowel sounds are normal. He exhibits no distension. There is no tenderness. There is no guarding.   Musculoskeletal: Normal range of motion. He exhibits no tenderness.   Trace-1+ edema B/L ankles.   Neurological: He is alert and oriented to person, place, and time. No cranial nerve deficit.   Skin: Skin is warm and dry. No rash noted. No erythema.   Psychiatric: He has a normal mood and affect.      Results Review:     Telemetry: Atrial fibrillation 90s-110    I reviewed the patient's new imaging results and agree with the interpretation.  I reviewed the patient's other test results and agree with the interpretation.    Results from last 7 days   Lab Units 05/19/20  0012 05/18/20  1322   WBC 10*3/mm3 6.53 8.05   HEMOGLOBIN g/dL 14.7 15.6   PLATELETS 10*3/mm3 69* 78*     Results from last 7 days   Lab Units 05/19/20  0012 05/18/20  1322   SODIUM mmol/L 139 139   POTASSIUM mmol/L 4.7 4.3   CHLORIDE mmol/L 108* 103   CO2 mmol/L 16.8* 21.5*   BUN mg/dL 25* 23   CREATININE mg/dL 0.99 1.06   CALCIUM mg/dL 8.5* 9.1   GLUCOSE mg/dL 121* 101*     Results from last 7 days   Lab Units 05/19/20  0012 05/18/20  1322   BILIRUBIN mg/dL 1.9* 2.5*   ALK PHOS U/L 133* 142*   AST (SGOT) U/L 52* 58*   ALT (SGPT) U/L 54* 59*     Results from last 7 days   Lab Units 05/18/20  1508   MAGNESIUM mg/dL 1.9     Results from last 7 days   Lab Units 05/18/20  1255   INR  1.10     Current Hospital Medications:    furosemide 20 mg Intravenous Once   pantoprazole 40 mg Oral Q AM   sodium chloride 10 mL Intravenous Q12H       dilTIAZem 5-15 mg/hr Last Rate: 5 mg/hr (05/19/20 0451)       Assessment/Plan     -Atrial fibrillation presenting with rapid ventricular response: Overall  patient remains in atrial fibrillation but heart rates are improved.  Continue with the low-dose Cardizem infusion while awaiting cardiology evaluation.  Patient's magnesium level was 1.9.  I will repeat this in a.m.  I will hold on his heparin infusion due to to slightly worsened thrombocytopenia.  I have ordered an echocardiogram which is pending at this time.  Patient's TSH was slightly elevated at 4.830 but his free T4 is normal at 1.24.  Further recommendations pending cardiology evaluation.    -Congestive heart failure and acute exacerbation with combined systolic and diastolic dysfunction: I will give the patient a one-time dose of IV Lasix this morning as overall his blood pressures have improved.  Repeat echocardiogram is ordered and pending in addition to a cardiology consult.  Further medication recommendations to be made thereafter.    -Thrombocytopenia: Patient's platelet count has decreased to 69,000 today.  He has no signs or symptoms of active bleeding.  I have requested a vitamin B12 and folate level and have ordered a peripheral blood smear.  Consider ultrasound imaging of the abdomen for further evaluation.  Patient with an abdominal ultrasound dated May 2019 that was unremarkable.  Patient's INR within normal limits at 1.10.    -Acute, mild transaminitis that could be due to passive congestion from his acute CHF exacerbation or due to some alcohol use/mild underlying liver disease: Overall his ALT and AST have a flat trend at 54 and 52 respectively.  I ordered an acute hepatitis panel that was negative.  Continue to monitor and trend.    -Mild hyperbilirubinemia that has improved 1.9: Continue to monitor for now.    -Alcohol use: Patient reports minimal alcohol use that he states is sporadic.  Patient certainly does not have any symptoms to suggest acute alcohol withdrawal.  Continue to monitor at this time.    -Mild hypoalbuminemia causing pseudo-hypocalcemia that is mild: Calcium level falsely  low today at 8.5.  Continue to monitor.    -Non-anion gap metabolic acidosis of unclear etiology: Patient's IV fluids were discontinued yesterday.  His CO2 was 16.8.  We will trend for now and repeat his chemistry panel in a.m.  Patient denies any diarrhea.    -Tobacco abuse: Counseled cessation. Declines NRT at this time.     -COPD, not in an acute exacerbation at this time: Patient with coarse breath sounds/wheezing on exam that improve with cough. Will hold on steroids. Continue with PRN Atrovent.    Patient is considered high risk due to: Afib, CHF, thrombocytopenia, medical non-compliance    I discussed the patients findings and my recommendations with patient and nursing staff.    Rose Cervantes DO  05/19/20  08:26
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
## 2020-05-19 NOTE — PLAN OF CARE
Problem: Fall Risk (Adult)  Goal: Absence of Fall  Outcome: Ongoing (interventions implemented as appropriate)  Flowsheets (Taken 5/19/2020 3820)  Absence of Fall: making progress toward outcome  Note:   Pt ambulating had one episode of dizziness and orthostatics done

## 2020-05-19 NOTE — PLAN OF CARE
Problem: Patient Care Overview  Goal: Plan of Care Review  Outcome: Ongoing (interventions implemented as appropriate)     Problem: Patient Care Overview  Goal: Individualization and Mutuality  Outcome: Ongoing (interventions implemented as appropriate)     Problem: Patient Care Overview  Goal: Discharge Needs Assessment  Outcome: Ongoing (interventions implemented as appropriate)     Problem: Patient Care Overview  Goal: Interprofessional Rounds/Family Conf  Outcome: Ongoing (interventions implemented as appropriate)     Problem: Pain, Chronic (Adult)  Goal: Identify Related Risk Factors and Signs and Symptoms  Outcome: Ongoing (interventions implemented as appropriate)     Problem: Pain, Chronic (Adult)  Goal: Acceptable Pain/Comfort Level and Functional Ability  Outcome: Ongoing (interventions implemented as appropriate)     Problem: Skin Injury Risk (Adult)  Goal: Identify Related Risk Factors and Signs and Symptoms  Outcome: Ongoing (interventions implemented as appropriate)     Problem: Skin Injury Risk (Adult)  Goal: Skin Health and Integrity  Outcome: Ongoing (interventions implemented as appropriate)     Problem: Fall Risk (Adult)  Goal: Identify Related Risk Factors and Signs and Symptoms  Outcome: Ongoing (interventions implemented as appropriate)     Problem: Fall Risk (Adult)  Goal: Absence of Fall  Outcome: Ongoing (interventions implemented as appropriate)

## 2020-05-19 NOTE — PAYOR COMM NOTE
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 Pain = Pain Score of 7-10, CPOT 5-8     1511             azithromycin 500 MG/250 ML 0.9% NS IVPB (MBP)   Dose: 500 mg  Freq: Once Route: IV  Indications of Use: PNEUMONIA  Last Dose: Stopped (05/18/20 1532)  Start: 05/18/20 1304 End: 05/18/20 1532    Admin Instructions:   Break seal and mix to activate vial before use     1432   1532           cefTRIAXone (ROCEPHIN) 1 g/100 mL 0.9% NS (MBP)   Dose: 1 g  Freq: Once Route: IV  Indications of Use: PNEUMONIA  Last Dose: Stopped (05/18/20 1419)  Start: 05/18/20 1304 End: 05/18/20 1419    Admin Instructions:   Caution: Look alike/sound alike drug alert. Break seal and mix to activiate vial before use.     1349   1419           dilTIAZem (CARDIZEM) bolus from bag 1 mg/mL 20 mg   Dose: 20 mg  Freq: Once Route: IV  Start: 05/18/20 1304 End: 05/18/20 1328    Admin Instructions:   May repeat initial bolus within 30 min with inadequate response, call MD for any later inadequate response  Caution: Look alike/sound alike drug alert.     1328             furosemide (LASIX) injection 20 mg   Dose: 20 mg  Freq: Once Route: IV  Start: 05/19/20 0830 End: 05/19/20 0913      0913            furosemide  (LASIX) injection 40 mg   Dose: 40 mg  Freq: Once Route: IV  Start: 05/18/20 1444 End: 05/18/20 1526     1458 [C]   1526-D/C'd         heparin (porcine) 5000 UNIT/ML injection 4,200 Units   Dose: 60 Units/kg  Weight Dosing Info: 70.8 kg  Freq: Once Route: IV  Indications of Use: ATRIAL FIBRILLATION  Start: 05/18/20 1602 End: 05/18/20 1633    Admin Instructions:   **Max Dose of 5,000 units**     1633             Ioversol (OPTIRAY 350) injection 100 mL   Dose: 100 mL  Freq: Once in Imaging Route: IV  Start: 05/18/20 1617 End: 05/18/20 1617     1617             pantoprazole (PROTONIX) EC tablet 40 mg   Dose: 40 mg  Freq: Every Early Morning Route: PO  Start: 05/19/20 0600    Admin Instructions:   Swallow whole; do not crush, split, or chew.      0500            sodium chloride 0.9 % bolus 1,000 mL   Dose: 1,000 mL  Freq: Once Route: IV  Last Dose: Stopped (05/18/20 1530)  Start: 05/18/20 1423 End: 05/18/20 1530     1432   1530           sodium chloride 0.9 % flush 10 mL   Dose: 10 mL  Freq: Every 12 Hours Scheduled Route: IV  Start: 05/18/20 2100     2046          0914   2100          Medications 05/17/20 05/18/20 05/19/20       Continuous Meds Sorted by Name   for Yared Mckeon as of 5/17/20 through 5/19/20    Legend:                           Discontinued    Completed    Future    MAR Hold     Other Encounter    Linked           Medications 05/17/20 05/18/20 05/19/20   dilTIAZem (CARDIZEM) 100 mg in 100 mL NS (1 mg/mL) infusion (ADV)   Rate: 5-15 mL/hr Dose: 5-15 mg/hr  Freq: Continuous Route: IV  Last Dose: 5 mg/hr (05/19/20 0451)  Start: 05/18/20 1304    Admin Instructions:   2For heart rate - To maintain HR less than 120, initiate infusion at 5 mg/hr. Titrate 5 mg/hr every 15 minutes to use the lowest dose possible. Maximum infusion rate of 15 mg/hr. Hold for SBP less than 120 mmHg or heart rate less than 60. Contact provider if unable to maintain HR less than 120 on maximum dose. Once SBP target achieved obtain  vitals a minimum of every 30 minutes. For patients not in critical care areas - obtain vitals a minimum of every 4 hours.  Caution: Look alike/sound alike drug alert.     1328   1458   1507     1530   2124        0451            heparin 09868 units/250 mL (100 units/mL) in 0.45 % NaCl infusion   Rate: 8.49 mL/hr Dose: 12 Units/kg/hr  Weight Dosing Info: 70.8 kg  Freq: Titrated Route: IV  Indications of Use: ATRIAL FIBRILLATION  Last Dose: Stopped (05/19/20 0745)  Start: 05/18/20 1602 End: 05/19/20 0737    Admin Instructions:   Weight Based Heparin Nomogram:    PTT  Less Than 45 sec:  Bolus 60 units/kg (Maximum of 5000 units) and Increase Rate By 4 units/kg/hr.  PTT 45-55 sec: Bolus 30 units/kg (Maximum of 2500 units) and Increase Rate By 2 units/kg/hr.  PTT 56-80 sec: No Bolus, No Rate Change.  PTT  sec: No Bolus, Decrease Rate by 2 units/kg/hr.  PTT Greater Than 100 sec:  No Bolus.  Hold Infusion 1 hour.  Decrease Rate By 3 units/kg/hr.     1635          0737-D/C'd  0745            sodium chloride 0.9 % infusion   Rate: 125 mL/hr Dose: 125 mL/hr  Freq: Continuous Route: IV  Last Dose: Stopped (05/18/20 1845)  Start: 05/18/20 1423 End: 05/18/20 1840     1432   1840-D/C'd  1845               PRN Meds Sorted by Name   for MckeonYared as of 5/17/20 through 5/19/20    Legend:                           Discontinued    Completed    Future    MAR Hold     Other Encounter    Linked           Medications 05/17/20 05/18/20 05/19/20   heparin (porcine) 5000 UNIT/ML injection 2,100 Units   Dose: 30 Units/kg  Weight Dosing Info: 70.8 kg  Freq: As Needed Route: IV  PRN Comment: Per Heparin Nomogram For PTT 45-55  Indications of Use: ATRIAL FIBRILLATION  Start: 05/18/20 1558 End: 05/19/20 0737    Admin Instructions:   **Max Dose of 2,500 units**      0737-D/C'd          heparin (porcine) 5000 UNIT/ML injection 4,200 Units   Dose: 60 Units/kg  Weight Dosing Info: 70.8 kg  Freq: As Needed Route: IV  PRN Comment: Per Heparin  Nomogram For PTT Less Than 45  Indications of Use: ATRIAL FIBRILLATION  Start: 05/18/20 1558 End: 05/19/20 0737    Admin Instructions:   **Max Dose of 5,000 units**      0737-D/C'd          ipratropium (ATROVENT) nebulizer solution 0.5 mg   Dose: 0.5 mg  Freq: Every 6 Hours PRN Route: NEBULIZATION  PRN Reason: Shortness of Air  Start: 05/18/20 2002    Admin Instructions:        2125             sodium chloride 0.9 % flush 10 mL   Dose: 10 mL  Freq: As Needed Route: IV  PRN Reason: Line Care  PRN Comment: After Medication Administration or Blood Draw  Start: 05/18/20 1802         sodium chloride 0.9 % flush 10 mL   Dose: 10 mL  Freq: As Needed Route: IV  PRN Reason: Line Care  Start: 05/18/20 1300         Medications 05/17/20 05/18/20 05/19/20

## 2020-05-19 NOTE — NURSING NOTE
Progressive Mobility    Date: 05/19/20     Mobility Initiation Contradictions: None    Day of Mobility Tuesday Activity Performed: up to toilet and in chair    Patient: tolerated    Assisted by 1 person/persons    Device(s) used: Gait Belt    Zoila Ridley RN   05/19/20

## 2020-05-20 LAB
ALBUMIN SERPL-MCNC: 3.56 G/DL (ref 3.5–5.2)
ALBUMIN/GLOB SERPL: 1.1 G/DL
ALP SERPL-CCNC: 137 U/L (ref 39–117)
ALT SERPL W P-5'-P-CCNC: 50 U/L (ref 1–41)
ANION GAP SERPL CALCULATED.3IONS-SCNC: 17 MMOL/L (ref 5–15)
AST SERPL-CCNC: 30 U/L (ref 1–40)
BASOPHILS # BLD AUTO: 0.05 10*3/MM3 (ref 0–0.2)
BASOPHILS NFR BLD AUTO: 0.7 % (ref 0–1.5)
BILIRUB SERPL-MCNC: 2.1 MG/DL (ref 0.2–1.2)
BUN BLD-MCNC: 26 MG/DL (ref 8–23)
BUN/CREAT SERPL: 23 (ref 7–25)
CALCIUM SPEC-SCNC: 9 MG/DL (ref 8.6–10.5)
CHLORIDE SERPL-SCNC: 99 MMOL/L (ref 98–107)
CO2 SERPL-SCNC: 21 MMOL/L (ref 22–29)
CREAT BLD-MCNC: 1.13 MG/DL (ref 0.76–1.27)
DEPRECATED RDW RBC AUTO: 53.9 FL (ref 37–54)
EOSINOPHIL # BLD AUTO: 0.06 10*3/MM3 (ref 0–0.4)
EOSINOPHIL NFR BLD AUTO: 0.8 % (ref 0.3–6.2)
ERYTHROCYTE [DISTWIDTH] IN BLOOD BY AUTOMATED COUNT: 15.4 % (ref 12.3–15.4)
GFR SERPL CREATININE-BSD FRML MDRD: 64 ML/MIN/1.73
GLOBULIN UR ELPH-MCNC: 3.1 GM/DL
GLUCOSE BLD-MCNC: 114 MG/DL (ref 65–99)
HCT VFR BLD AUTO: 48.5 % (ref 37.5–51)
HGB BLD-MCNC: 15.9 G/DL (ref 13–17.7)
HIV1+2 AB SER QL: NORMAL
IMM GRANULOCYTES # BLD AUTO: 0.06 10*3/MM3 (ref 0–0.05)
IMM GRANULOCYTES NFR BLD AUTO: 0.8 % (ref 0–0.5)
LYMPHOCYTES # BLD AUTO: 1.45 10*3/MM3 (ref 0.7–3.1)
LYMPHOCYTES NFR BLD AUTO: 20 % (ref 19.6–45.3)
MCH RBC QN AUTO: 31.7 PG (ref 26.6–33)
MCHC RBC AUTO-ENTMCNC: 32.8 G/DL (ref 31.5–35.7)
MCV RBC AUTO: 96.6 FL (ref 79–97)
MONOCYTES # BLD AUTO: 1.36 10*3/MM3 (ref 0.1–0.9)
MONOCYTES NFR BLD AUTO: 18.8 % (ref 5–12)
NEUTROPHILS # BLD AUTO: 4.26 10*3/MM3 (ref 1.7–7)
NEUTROPHILS NFR BLD AUTO: 58.9 % (ref 42.7–76)
NRBC BLD AUTO-RTO: 0.3 /100 WBC (ref 0–0.2)
PLATELET # BLD AUTO: 85 10*3/MM3 (ref 140–450)
PMV BLD AUTO: 13.6 FL (ref 6–12)
POTASSIUM BLD-SCNC: 4.2 MMOL/L (ref 3.5–5.2)
PROT SERPL-MCNC: 6.7 G/DL (ref 6–8.5)
RBC # BLD AUTO: 5.02 10*6/MM3 (ref 4.14–5.8)
SODIUM BLD-SCNC: 137 MMOL/L (ref 136–145)
WBC NRBC COR # BLD: 7.24 10*3/MM3 (ref 3.4–10.8)

## 2020-05-20 PROCEDURE — G0432 EIA HIV-1/HIV-2 SCREEN: HCPCS | Performed by: INTERNAL MEDICINE

## 2020-05-20 PROCEDURE — 99232 SBSQ HOSP IP/OBS MODERATE 35: CPT | Performed by: INTERNAL MEDICINE

## 2020-05-20 PROCEDURE — 80053 COMPREHEN METABOLIC PANEL: CPT | Performed by: INTERNAL MEDICINE

## 2020-05-20 PROCEDURE — 85025 COMPLETE CBC W/AUTO DIFF WBC: CPT | Performed by: INTERNAL MEDICINE

## 2020-05-20 PROCEDURE — 25010000002 FUROSEMIDE PER 20 MG: Performed by: INTERNAL MEDICINE

## 2020-05-20 PROCEDURE — 94799 UNLISTED PULMONARY SVC/PX: CPT

## 2020-05-20 PROCEDURE — 25010000002 AMIODARONE IN DEXTROSE 5% 360-4.14 MG/200ML-% SOLUTION: Performed by: INTERNAL MEDICINE

## 2020-05-20 RX ORDER — METOPROLOL SUCCINATE 25 MG/1
12.5 TABLET, EXTENDED RELEASE ORAL
Status: DISCONTINUED | OUTPATIENT
Start: 2020-05-20 | End: 2020-05-21

## 2020-05-20 RX ADMIN — SODIUM CHLORIDE, PRESERVATIVE FREE 10 ML: 5 INJECTION INTRAVENOUS at 22:07

## 2020-05-20 RX ADMIN — AMIODARONE HYDROCHLORIDE 1 MG/MIN: 1.8 INJECTION, SOLUTION INTRAVENOUS at 08:39

## 2020-05-20 RX ADMIN — SODIUM CHLORIDE, PRESERVATIVE FREE 10 ML: 5 INJECTION INTRAVENOUS at 09:45

## 2020-05-20 RX ADMIN — AMIODARONE HYDROCHLORIDE 1 MG/MIN: 1.8 INJECTION, SOLUTION INTRAVENOUS at 01:42

## 2020-05-20 RX ADMIN — APIXABAN 2.5 MG: 2.5 TABLET, FILM COATED ORAL at 20:46

## 2020-05-20 RX ADMIN — APIXABAN 2.5 MG: 2.5 TABLET, FILM COATED ORAL at 11:17

## 2020-05-20 RX ADMIN — FUROSEMIDE 40 MG: 10 INJECTION, SOLUTION INTRAMUSCULAR; INTRAVENOUS at 20:46

## 2020-05-20 RX ADMIN — AMIODARONE HYDROCHLORIDE 1 MG/MIN: 1.8 INJECTION, SOLUTION INTRAVENOUS at 23:11

## 2020-05-20 RX ADMIN — FUROSEMIDE 40 MG: 10 INJECTION, SOLUTION INTRAMUSCULAR; INTRAVENOUS at 08:39

## 2020-05-20 RX ADMIN — METOPROLOL SUCCINATE 12.5 MG: 25 TABLET, EXTENDED RELEASE ORAL at 09:44

## 2020-05-20 RX ADMIN — AMIODARONE HYDROCHLORIDE 1 MG/MIN: 1.8 INJECTION, SOLUTION INTRAVENOUS at 16:34

## 2020-05-20 NOTE — PROGRESS NOTES
[facility] CCU Note    Patient Identification:  Name:  Yared Mckeon  Age:  69 y.o.  Sex:  male  :  1950  MRN:  [id number]  Visit number:  [id number]  Primary Care Provider:  Tiera Tan APRN    2    Drips: Amiodarone  Antibiotics: None  Lines: Peripheral  Acevedo: No    Procedures:  None    HPI:  68 yo male being seen in follow up for atrial fibrillation with RVR    Subjective     Patient seen and examined earlier today. He reports that his shortness of breath has improved but he continues to have intermittent shortness of breath that occur with exertion and at rest.     No chest pains. No dizziness at this time.    He reports good urine output with Lasix with approximately 3600 mL overnight.    History taken from: chart RN   Patient unable to give history due to: N/A  Present during visit: ZAY Bang    Objective     Vital Signs  Temp:  [95.8 °F (35.4 °C)-98.2 °F (36.8 °C)] 95.8 °F (35.4 °C)  Heart Rate:  [] 89  Resp:  [18-28] 20  BP: ()/() 100/76  Body mass index is 24.9 kg/m².    Intake/Output Summary (Last 24 hours) at 2020 1603  Last data filed at 2020 1400  Gross per 24 hour   Intake 480 ml   Output 3600 ml   Net -3120 ml       Physical Exam:    Physical Exam   Constitutional: He is oriented to person, place, and time. He appears well-developed and well-nourished. No distress.   HENT:   Head: Normocephalic and atraumatic.   Mouth/Throat: Oropharynx is clear and moist.   Eyes: Pupils are equal, round, and reactive to light. Conjunctivae and EOM are normal.   Neck: Neck supple. JVD present. No tracheal deviation present.   Cardiovascular: Normal rate. An irregularly irregular rhythm present. Exam reveals no gallop and no friction rub.   No murmur heard.  Pulses:       Posterior tibial pulses are 2+ on the right side, and 2+ on the left side.   Pulmonary/Chest: No respiratory distress. He has decreased breath sounds in the right lower field and the  left lower field. He has no wheezes. He has no rales.   Abdominal: Soft. Bowel sounds are normal. He exhibits no distension. There is no tenderness. There is no guarding.   Musculoskeletal: Normal range of motion. He exhibits no tenderness.   No significant lower extremity edema.   Neurological: He is alert and oriented to person, place, and time. No cranial nerve deficit.   Skin: Skin is warm and dry. No rash noted. No erythema.   Psychiatric: He has a normal mood and affect.      Results Review:     Telemetry: Atrial fibrillation 90s-110    I reviewed the patient's new imaging results and agree with the interpretation.  I reviewed the patient's other test results and agree with the interpretation.    Results from last 7 days   Lab Units 05/20/20  0125 05/19/20  0012 05/18/20  1322   WBC 10*3/mm3 7.24 6.53 8.05   HEMOGLOBIN g/dL 15.9 14.7 15.6   PLATELETS 10*3/mm3 85* 69* 78*     Results from last 7 days   Lab Units 05/20/20  0125 05/19/20  0012 05/18/20  1322   SODIUM mmol/L 137 139 139   POTASSIUM mmol/L 4.2 4.7 4.3   CHLORIDE mmol/L 99 108* 103   CO2 mmol/L 21.0* 16.8* 21.5*   BUN mg/dL 26* 25* 23   CREATININE mg/dL 1.13 0.99 1.06   CALCIUM mg/dL 9.0 8.5* 9.1   GLUCOSE mg/dL 114* 121* 101*     Results from last 7 days   Lab Units 05/20/20  0125 05/19/20  0012 05/18/20  1322   BILIRUBIN mg/dL 2.1* 1.9* 2.5*   ALK PHOS U/L 137* 133* 142*   AST (SGOT) U/L 30 52* 58*   ALT (SGPT) U/L 50* 54* 59*     Results from last 7 days   Lab Units 05/18/20  1508   MAGNESIUM mg/dL 1.9     Results from last 7 days   Lab Units 05/18/20  1255   INR  1.10     Current Hospital Medications:    apixaban 2.5 mg Oral Q12H   furosemide 40 mg Intravenous Q12H   metoprolol succinate XL 12.5 mg Oral Q24H   pantoprazole 40 mg Oral Q AM   sodium chloride 10 mL Intravenous Q12H       amiodarone 1 mg/min Last Rate: 1 mg/min (05/20/20 0839)       Assessment/Plan     -Atrial fibrillation presenting with rapid ventricular response: Appreciate  Cardiology's recommendations. IV Cardizem changed to IV Amiodarone with plans to transition to PO Amiodarone. Cardiology has initiated low dose Eliquis for stroke prevention. Will monitor closely in the setting of thrombocytopenia.    -Congestive heart failure and acute exacerbation with combined systolic and diastolic dysfunction: Continue with IV furosemide for now. Patient has been started on toprol XL.     -Thrombocytopenia: Patient's platelet count has improved to 85,000 today.  He has no signs or symptoms of active bleeding. Vitamin B12 and folate levels are within normal limits. Peripheral blood smear is pending. Will update an US of the abdomen. Patient with an abdominal ultrasound dated May 2019 that was unremarkable.      -Acute, mild transaminitis that could be due to passive congestion from his acute CHF exacerbation or due to some alcohol use/mild underlying liver disease: Overall his ALT and AST have a flat trend at 50 and 30 respectively.  I ordered an acute hepatitis panel that was negative.  Continue to monitor and trend.    -Mild hyperbilirubinemia that is stable at 2.1: Continue to monitor for now.    -Alcohol use: Patient reports minimal alcohol use that he states is sporadic.  Patient certainly does not have any symptoms to suggest acute alcohol withdrawal.  Continue to monitor at this time.    -Mild hypoalbuminemia causing pseudo-hypocalcemia that is mild: Resolved. Continue to monitor.    -Anion gap metabolic acidosis of unclear etiology: AG 17.0 today and may have been due to patient's NPO status.     -Tobacco abuse: Counseled cessation. Declines NRT at this time.     -COPD, not in an acute exacerbation at this time: Continue with PRN Atrovent.    Patient is considered high risk due to: Afib, CHF, thrombocytopenia, medical non-compliance    I discussed the patients findings and my recommendations with patient and nursing staff.    Rose Cervantes DO  05/20/20  16:03

## 2020-05-20 NOTE — PHARMACY PATIENT ASSISTANCE
Pharmacy reviewed cost for Yared Mckeon on Eliquis.  Copay is $3.90 for a 30 day supply.  No issues identified at this time.    Thank you,  Kings Dickey, Pharmacy Intern  05/20/20  10:29

## 2020-05-20 NOTE — PLAN OF CARE
Problem: Patient Care Overview  Goal: Plan of Care Review  Outcome: Ongoing (interventions implemented as appropriate)  Flowsheets (Taken 5/20/2020 0223)  Plan of Care Reviewed With: patient  Note:   Pt resting well with no new complaints. Vitals stable afebrile. Continues on amiodarone drip at 1mg/min. Will continue to monitor.

## 2020-05-20 NOTE — PROGRESS NOTES
Patient Identification:  Name:  Yared Mckeon  Age:  69 y.o.  Sex:  male  :  1950  MRN:  [id number]  Visit Number:  [id number]    Chief Complaint:   Dyspnea    Subjective:    Pt seen and examined   He feels his breathing is better but not back to baseline   Had good UO response to IV Lasix   HR still remain in 100's Afib.   ----------------------------------------------------------------------------------------------------------------------  Current Hospital Meds:    apixaban 2.5 mg Oral Q12H   furosemide 40 mg Intravenous Q12H   metoprolol succinate XL 12.5 mg Oral Q24H   pantoprazole 40 mg Oral Q AM   sodium chloride 10 mL Intravenous Q12H       amiodarone 1 mg/min Last Rate: 1 mg/min (20 0839)     ----------------------------------------------------------------------------------------------------------------------  Vital Signs:  Temp:  [96.9 °F (36.1 °C)-98.2 °F (36.8 °C)] 96.9 °F (36.1 °C)  Heart Rate:  [] 92  Resp:  [16] 22  BP: ()/() 100/78      20  0434 20  0058 20  0400   Weight: 71.7 kg (158 lb) 72.1 kg (159 lb) 72.1 kg (159 lb)     Body mass index is 24.9 kg/m².    Intake/Output Summary (Last 24 hours) at 2020 0840  Last data filed at 2020 0500  Gross per 24 hour   Intake 480 ml   Output 2950 ml   Net -2470 ml     Diet Regular; Cardiac, Daily Fluid Restriction; 1500 mL Fluid Per Day  ----------------------------------------------------------------------------------------------------------------------  Physical exam:  Constitutional:  Awake and comfortable  HENT:  Head:  Normocephalic and atraumatic.    Eyes:  Conjunctivae and EOM are normal.  Pupils are equal, round, and reactive to light.  No scleral icterus.    Neck:  Neck supple.  No JVD present.    Cardiovascular:  Irregular rhythm, S1 S2+, NO S3 / S4  Pulmonary/Chest:  Vesicular breath sounds B/L, Basliar crackles +  Abdominal:  Soft.  Bowel sounds are normal.  No distension and no  tenderness.      Neurological:  Alert and oriented to person, place, and time. No focal defecits  Skin:  Skin is warm and dry. No rash noted. No pallor.   Musculoskeletal:  1+ edema, no tenderness, and no deformity.  No red or swollen joints anywhere.   Peripheral vascular:  2+ Pulses B/L DP  ----------------------------------------------------------------------------------------------------------------------    ----------------------------------------------------------------------------------------------------------------------  Results from last 7 days   Lab Units 05/18/20  1844 05/18/20  1508 05/18/20  1322   TROPONIN T ng/mL <0.010 <0.010 <0.010     Results from last 7 days   Lab Units 05/20/20  0125 05/19/20  0012 05/18/20  1322 05/18/20  1255   LACTATE mmol/L  --   --  1.6  --    WBC 10*3/mm3 7.24 6.53 8.05  --    HEMOGLOBIN g/dL 15.9 14.7 15.6  --    HEMATOCRIT % 48.5 45.3 47.8  --    MCV fL 96.6 97.0 97.2*  --    MCHC g/dL 32.8 32.5 32.6  --    PLATELETS 10*3/mm3 85* 69* 78*  --    INR   --   --   --  1.10     Results from last 7 days   Lab Units 05/18/20  1540   PH, ARTERIAL pH units 7.424   PO2 ART mm Hg 96.7   PCO2, ARTERIAL mm Hg 25.5*   HCO3 ART mmol/L 16.7*     Results from last 7 days   Lab Units 05/20/20  0125 05/19/20  0012 05/18/20  1508 05/18/20  1322   SODIUM mmol/L 137 139  --  139   POTASSIUM mmol/L 4.2 4.7  --  4.3   MAGNESIUM mg/dL  --   --  1.9  --    CHLORIDE mmol/L 99 108*  --  103   CO2 mmol/L 21.0* 16.8*  --  21.5*   BUN mg/dL 26* 25*  --  23   CREATININE mg/dL 1.13 0.99  --  1.06   EGFR IF NONAFRICN AM mL/min/1.73 64 75  --  69   CALCIUM mg/dL 9.0 8.5*  --  9.1   GLUCOSE mg/dL 114* 121*  --  101*   ALBUMIN g/dL 3.56 3.24*  --  3.52   BILIRUBIN mg/dL 2.1* 1.9*  --  2.5*   ALK PHOS U/L 137* 133*  --  142*   AST (SGOT) U/L 30 52*  --  58*   ALT (SGPT) U/L 50* 54*  --  59*   Estimated Creatinine Clearance: 62.9 mL/min (by C-G formula based on SCr of 1.13 mg/dL).    No results found for:  AMMONIA      Blood Culture   Date Value Ref Range Status   05/18/2020 No growth at 24 hours  Preliminary   05/18/2020 No growth at 24 hours  Preliminary     No results found for: URINECX  No results found for: WOUNDCX  No results found for: STOOLCX    I have personally looked at the labs and they are summarized above.  ----------------------------------------------------------------------------------------------------------------------  Imaging Results (Last 24 Hours)     ** No results found for the last 24 hours. **        ----------------------------------------------------------------------------------------------------------------------    Assessment:  Acute on chronic combined systolic and diastolic CHF, NYHA functional class III-IV, ACC AHA class C  Nonischemic cardiomyopathy with EF of 25 to 30%, likely tachycardia mediated.  Persistent A. fib, medical noncompliance  VHD - Moderate ,MR , Mild AI , likely from functional from CHF.   Thrombocytopenia, evaluation pending , Platelets improved today  Chronic active tobacco use disorder   Chronix Etoh use disorder       Plan:  Cont with IV Amiodarone at 1 mg/hr for today   Will give another dose of 250 IV Dig this am  Will add Toprol XL 12.5 PO QD today  No ACE/ARB/ANRI due to borderline low BP  Cont with IV Lasix for today  Cont with low dose Eliquis for OAC, if platelets are >100 k will switch to full dose , then likely will attempt ECV after 4-6 weeks of OAC, also explained to him that we can to DARLING/CV but that will commit for him to take his OAC for 6 weeks post ECV and if Platelets counts drop it will be a hard situation to stop to risk CVA. So explained will try with medications first to control his HR and then if he tolerated OAC for 6 weeks then we can do EVC.         Jonas Cheek MD, [facility]  Interventional Cardiology        05/20/20  08:40

## 2020-05-20 NOTE — PLAN OF CARE
Vital signs stable. Pt remains in afib on amiodarone gtt. No new complaints at this time. Will continue to monitor.

## 2020-05-21 ENCOUNTER — APPOINTMENT (OUTPATIENT)
Dept: ULTRASOUND IMAGING | Facility: HOSPITAL | Age: 70
End: 2020-05-21

## 2020-05-21 LAB
ALBUMIN SERPL-MCNC: 3.18 G/DL (ref 3.5–5.2)
ALBUMIN/GLOB SERPL: 1.2 G/DL
ALP SERPL-CCNC: 110 U/L (ref 39–117)
ALT SERPL W P-5'-P-CCNC: 35 U/L (ref 1–41)
ANION GAP SERPL CALCULATED.3IONS-SCNC: 13.5 MMOL/L (ref 5–15)
AST SERPL-CCNC: 20 U/L (ref 1–40)
BASOPHILS # BLD AUTO: 0.03 10*3/MM3 (ref 0–0.2)
BASOPHILS NFR BLD AUTO: 0.4 % (ref 0–1.5)
BILIRUB SERPL-MCNC: 1.4 MG/DL (ref 0.2–1.2)
BUN BLD-MCNC: 25 MG/DL (ref 8–23)
BUN/CREAT SERPL: 21 (ref 7–25)
CALCIUM SPEC-SCNC: 8.7 MG/DL (ref 8.6–10.5)
CHLORIDE SERPL-SCNC: 99 MMOL/L (ref 98–107)
CO2 SERPL-SCNC: 27.5 MMOL/L (ref 22–29)
CREAT BLD-MCNC: 1.19 MG/DL (ref 0.76–1.27)
CYTOLOGIST CVX/VAG CYTO: NORMAL
DEPRECATED RDW RBC AUTO: 54.1 FL (ref 37–54)
EOSINOPHIL # BLD AUTO: 0.1 10*3/MM3 (ref 0–0.4)
EOSINOPHIL NFR BLD AUTO: 1.3 % (ref 0.3–6.2)
ERYTHROCYTE [DISTWIDTH] IN BLOOD BY AUTOMATED COUNT: 15.4 % (ref 12.3–15.4)
GFR SERPL CREATININE-BSD FRML MDRD: 60 ML/MIN/1.73
GLOBULIN UR ELPH-MCNC: 2.7 GM/DL
GLUCOSE BLD-MCNC: 95 MG/DL (ref 65–99)
HCT VFR BLD AUTO: 45.3 % (ref 37.5–51)
HGB BLD-MCNC: 14.5 G/DL (ref 13–17.7)
IMM GRANULOCYTES # BLD AUTO: 0.05 10*3/MM3 (ref 0–0.05)
IMM GRANULOCYTES NFR BLD AUTO: 0.7 % (ref 0–0.5)
LYMPHOCYTES # BLD AUTO: 1.25 10*3/MM3 (ref 0.7–3.1)
LYMPHOCYTES NFR BLD AUTO: 16.6 % (ref 19.6–45.3)
MAGNESIUM SERPL-MCNC: 1.7 MG/DL (ref 1.6–2.4)
MCH RBC QN AUTO: 31 PG (ref 26.6–33)
MCHC RBC AUTO-ENTMCNC: 32 G/DL (ref 31.5–35.7)
MCV RBC AUTO: 96.8 FL (ref 79–97)
MONOCYTES # BLD AUTO: 1.35 10*3/MM3 (ref 0.1–0.9)
MONOCYTES NFR BLD AUTO: 17.9 % (ref 5–12)
NEUTROPHILS # BLD AUTO: 4.76 10*3/MM3 (ref 1.7–7)
NEUTROPHILS NFR BLD AUTO: 63.1 % (ref 42.7–76)
NRBC BLD AUTO-RTO: 0 /100 WBC (ref 0–0.2)
PATH INTERP BLD-IMP: NORMAL
PLATELET # BLD AUTO: 96 10*3/MM3 (ref 140–450)
PMV BLD AUTO: 13 FL (ref 6–12)
POTASSIUM BLD-SCNC: 3.7 MMOL/L (ref 3.5–5.2)
PROT SERPL-MCNC: 5.9 G/DL (ref 6–8.5)
RBC # BLD AUTO: 4.68 10*6/MM3 (ref 4.14–5.8)
SODIUM BLD-SCNC: 140 MMOL/L (ref 136–145)
WBC NRBC COR # BLD: 7.54 10*3/MM3 (ref 3.4–10.8)

## 2020-05-21 PROCEDURE — 83735 ASSAY OF MAGNESIUM: CPT | Performed by: INTERNAL MEDICINE

## 2020-05-21 PROCEDURE — 85025 COMPLETE CBC W/AUTO DIFF WBC: CPT | Performed by: INTERNAL MEDICINE

## 2020-05-21 PROCEDURE — 25010000002 MAGNESIUM SULFATE 2 GM/50ML SOLUTION: Performed by: INTERNAL MEDICINE

## 2020-05-21 PROCEDURE — 94799 UNLISTED PULMONARY SVC/PX: CPT

## 2020-05-21 PROCEDURE — 99232 SBSQ HOSP IP/OBS MODERATE 35: CPT | Performed by: INTERNAL MEDICINE

## 2020-05-21 PROCEDURE — 25010000002 FUROSEMIDE PER 20 MG: Performed by: INTERNAL MEDICINE

## 2020-05-21 PROCEDURE — 76700 US EXAM ABDOM COMPLETE: CPT | Performed by: RADIOLOGY

## 2020-05-21 PROCEDURE — 99233 SBSQ HOSP IP/OBS HIGH 50: CPT | Performed by: INTERNAL MEDICINE

## 2020-05-21 PROCEDURE — 76700 US EXAM ABDOM COMPLETE: CPT

## 2020-05-21 PROCEDURE — 80053 COMPREHEN METABOLIC PANEL: CPT | Performed by: INTERNAL MEDICINE

## 2020-05-21 RX ORDER — MAGNESIUM SULFATE HEPTAHYDRATE 40 MG/ML
2 INJECTION, SOLUTION INTRAVENOUS ONCE
Status: COMPLETED | OUTPATIENT
Start: 2020-05-21 | End: 2020-05-21

## 2020-05-21 RX ORDER — AMIODARONE HYDROCHLORIDE 200 MG/1
200 TABLET ORAL EVERY 12 HOURS SCHEDULED
Status: DISCONTINUED | OUTPATIENT
Start: 2020-05-21 | End: 2020-05-22 | Stop reason: HOSPADM

## 2020-05-21 RX ORDER — AMOXICILLIN 250 MG
1 CAPSULE ORAL 2 TIMES DAILY
Status: DISCONTINUED | OUTPATIENT
Start: 2020-05-21 | End: 2020-05-22 | Stop reason: HOSPADM

## 2020-05-21 RX ORDER — METOPROLOL SUCCINATE 25 MG/1
25 TABLET, EXTENDED RELEASE ORAL
Status: DISCONTINUED | OUTPATIENT
Start: 2020-05-22 | End: 2020-05-22 | Stop reason: HOSPADM

## 2020-05-21 RX ADMIN — POLYETHYLENE GLYCOL (3350) 17 G: 17 POWDER, FOR SOLUTION ORAL at 19:08

## 2020-05-21 RX ADMIN — DOCUSATE SODIUM 50MG AND SENNOSIDES 8.6MG 1 TABLET: 8.6; 5 TABLET, FILM COATED ORAL at 09:25

## 2020-05-21 RX ADMIN — FUROSEMIDE 40 MG: 10 INJECTION, SOLUTION INTRAMUSCULAR; INTRAVENOUS at 08:24

## 2020-05-21 RX ADMIN — AMIODARONE HYDROCHLORIDE 200 MG: 200 TABLET ORAL at 21:03

## 2020-05-21 RX ADMIN — SACUBITRIL AND VALSARTAN 1 TABLET: 24; 26 TABLET, FILM COATED ORAL at 11:07

## 2020-05-21 RX ADMIN — APIXABAN 2.5 MG: 2.5 TABLET, FILM COATED ORAL at 08:24

## 2020-05-21 RX ADMIN — METOPROLOL SUCCINATE 12.5 MG: 25 TABLET, EXTENDED RELEASE ORAL at 08:24

## 2020-05-21 RX ADMIN — DOCUSATE SODIUM 50MG AND SENNOSIDES 8.6MG 1 TABLET: 8.6; 5 TABLET, FILM COATED ORAL at 21:03

## 2020-05-21 RX ADMIN — FUROSEMIDE 40 MG: 10 INJECTION, SOLUTION INTRAMUSCULAR; INTRAVENOUS at 21:03

## 2020-05-21 RX ADMIN — AMIODARONE HYDROCHLORIDE 200 MG: 200 TABLET ORAL at 11:07

## 2020-05-21 RX ADMIN — SODIUM CHLORIDE, PRESERVATIVE FREE 10 ML: 5 INJECTION INTRAVENOUS at 08:24

## 2020-05-21 RX ADMIN — APIXABAN 5 MG: 5 TABLET, FILM COATED ORAL at 21:03

## 2020-05-21 RX ADMIN — SODIUM CHLORIDE, PRESERVATIVE FREE 10 ML: 5 INJECTION INTRAVENOUS at 21:07

## 2020-05-21 RX ADMIN — MAGNESIUM SULFATE 2 G: 2 INJECTION INTRAVENOUS at 09:25

## 2020-05-21 NOTE — PROGRESS NOTES
[facility] CCU Note    Patient Identification:  Name:  Yared Mckeon  Age:  70 y.o.  Sex:  male  :  1950  MRN:  [id number]  Visit number:  [id number]  Primary Care Provider:  Tiera Tan APRN    3    Drips: Amiodarone  Antibiotics: None  Lines: Peripheral  Acevedo: No    Procedures:  None    HPI:  68 yo male being seen in follow up for atrial fibrillation with RVR    Subjective     Patient seen and examined earlier today.  Patient was resting in bed.  He  complains of constipation. He denies any nausea and vomiting. He reports ongoing dyspnea but states that it continues to improve. No chest pains. Patient is in good spirits today as it is his birthday.    History taken from: chart RN   Patient unable to give history due to: N/A  Present during visit: N/A    Objective     Vital Signs  Temp:  [95.2 °F (35.1 °C)-98.6 °F (37 °C)] 95.2 °F (35.1 °C)  Heart Rate:  [] 68  Resp:  [14-23] 22  BP: ()/() 91/79  Body mass index is 25.03 kg/m².    Intake/Output Summary (Last 24 hours) at 2020 1612  Last data filed at 2020 1200  Gross per 24 hour   Intake 1700.62 ml   Output 2670 ml   Net -969.38 ml       Physical Exam:    Physical Exam   Constitutional: He is oriented to person, place, and time. He appears well-developed and well-nourished. No distress.   HENT:   Head: Normocephalic and atraumatic.   Mouth/Throat: Oropharynx is clear and moist.   Eyes: Pupils are equal, round, and reactive to light. Conjunctivae and EOM are normal.   Neck: Neck supple. No tracheal deviation present.   Cardiovascular: Normal rate. A regularly irregular rhythm present. Exam reveals no gallop and no friction rub.   No murmur heard.  Pulses:       Posterior tibial pulses are 2+ on the right side, and 2+ on the left side.   Pulmonary/Chest: No respiratory distress. He has decreased breath sounds in the right lower field and the left lower field. He has no wheezes. He has no rales.    Occasional expiratory wheeze.   Abdominal: Soft. Bowel sounds are normal. He exhibits no distension. There is no tenderness. There is no guarding.   Musculoskeletal: Normal range of motion. He exhibits no tenderness.   No significant lower extremity edema.   Neurological: He is alert and oriented to person, place, and time. No cranial nerve deficit.   Skin: Skin is warm and dry. No rash noted. No erythema.   Psychiatric: He has a normal mood and affect.      Results Review:     Telemetry: Atrial fibrillation 70s-90s    I reviewed the patient's new imaging results and agree with the interpretation.  I reviewed the patient's other test results and agree with the interpretation.    Results from last 7 days   Lab Units 05/21/20  0020 05/20/20  0125 05/19/20  0012 05/18/20  1322   WBC 10*3/mm3 7.54 7.24 6.53 8.05   HEMOGLOBIN g/dL 14.5 15.9 14.7 15.6   PLATELETS 10*3/mm3 96* 85* 69* 78*     Results from last 7 days   Lab Units 05/21/20  0020 05/20/20  0125 05/19/20  0012 05/18/20  1322   SODIUM mmol/L 140 137 139 139   POTASSIUM mmol/L 3.7 4.2 4.7 4.3   CHLORIDE mmol/L 99 99 108* 103   CO2 mmol/L 27.5 21.0* 16.8* 21.5*   BUN mg/dL 25* 26* 25* 23   CREATININE mg/dL 1.19 1.13 0.99 1.06   CALCIUM mg/dL 8.7 9.0 8.5* 9.1   GLUCOSE mg/dL 95 114* 121* 101*     Results from last 7 days   Lab Units 05/21/20  0020 05/20/20  0125 05/19/20  0012 05/18/20  1322   BILIRUBIN mg/dL 1.4* 2.1* 1.9* 2.5*   ALK PHOS U/L 110 137* 133* 142*   AST (SGOT) U/L 20 30 52* 58*   ALT (SGPT) U/L 35 50* 54* 59*     Results from last 7 days   Lab Units 05/21/20  0020 05/18/20  1508   MAGNESIUM mg/dL 1.7 1.9     Results from last 7 days   Lab Units 05/18/20  1255   INR  1.10     Current Hospital Medications:    amiodarone 200 mg Oral Q12H   apixaban 5 mg Oral Q12H   furosemide 40 mg Intravenous Q12H   [START ON 5/22/2020] metoprolol succinate XL 25 mg Oral Q24H   pantoprazole 40 mg Oral Q AM   sacubitril-valsartan 1 tablet Oral Q12H    sennosides-docusate 1 tablet Oral BID   sodium chloride 10 mL Intravenous Q12H          Assessment/Plan     -Atrial fibrillation presenting with rapid ventricular response: Appreciate Cardiology's recommendations. IV Cardizem changed to IV Amiodarone with plans to transition to PO Amiodarone today. Cardiology has initiated low dose Eliquis for stroke prevention. Will monitor closely in the setting of thrombocytopenia. Today's platelet count improved to 96.     -Constipation: Add colace and PRN Miralax.    -Congestive heart failure and acute exacerbation with combined systolic and diastolic dysfunction: Continue with IV furosemide for now. Patient has been started on toprol XL. Entresto has been added today.    -Thrombocytopenia: Patient's platelet count has improved to 96,000 today.  He has no signs or symptoms of active bleeding. Vitamin B12 and folate levels are within normal limits. Peripheral blood smear is pending.     Today's abdominal US did not reveal any evidence of cirrhosis. Spleen size is within normal limits.      -Acute, mild transaminitis that could be due to passive congestion from his acute CHF exacerbation or due to some alcohol use/mild underlying liver disease: Resolved. I ordered an acute hepatitis panel that was negative.  Continue to monitor and trend.    -Mild hyperbilirubinemia that is improved at 1.4: Continue to monitor for now.    -Acute hypomagnesemia: I have ordered Mg supplementation today. Repeat Mg level in am.    -Alcohol use: Patient reports minimal alcohol use that he states is sporadic.  Patient certainly does not have any symptoms to suggest acute alcohol withdrawal.  Continue to monitor at this time.    -Mild hypoalbuminemia causing pseudo-hypocalcemia that is mild: Resolved. Continue to monitor.    -Anion gap metabolic acidosis of unclear etiology: Resolved.     -Tobacco abuse: Counseled cessation. Declines NRT at this time.     -COPD, not in an acute exacerbation at this  time: Continue with PRN Atrovent.    Patient is considered high risk due to: Afib, CHF, thrombocytopenia, medical non-compliance    I discussed the patients findings and my recommendations with patient and nursing staff (ZAY Vazquez)    Rose Cervantes DO  05/21/20  16:12

## 2020-05-21 NOTE — PROGRESS NOTES
Patient Identification:  Name:  Yared Mckeon  Age:  70 y.o.  Sex:  male  :  1950  MRN:  [id number]  Visit Number:  [id number]    Chief Complaint:   Dyspnea    Subjective:    Pt seen and examined   Breathing is better   HR is still in 100 on ambulation, resting ~ 80's  ----------------------------------------------------------------------------------------------------------------------  Current Hospital Meds:    amiodarone 200 mg Oral Q12H   apixaban 5 mg Oral Q12H   furosemide 40 mg Intravenous Q12H   [START ON 2020] metoprolol succinate XL 25 mg Oral Q24H   pantoprazole 40 mg Oral Q AM   sacubitril-valsartan 1 tablet Oral Q12H   sennosides-docusate 1 tablet Oral BID   sodium chloride 10 mL Intravenous Q12H        ----------------------------------------------------------------------------------------------------------------------  Vital Signs:  Temp:  [95.2 °F (35.1 °C)-98.6 °F (37 °C)] 95.2 °F (35.1 °C)  Heart Rate:  [] 85  Resp:  [14-] 16  BP: ()/() 92/63      20  0058 200 20  040   Weight: 72.1 kg (159 lb) 72.1 kg (159 lb) 72.5 kg (159 lb 12.8 oz)     Body mass index is 25.03 kg/m².    Intake/Output Summary (Last 24 hours) at 2020 1550  Last data filed at 2020 1200  Gross per 24 hour   Intake 1700.62 ml   Output 2995 ml   Net -1294.38 ml     Diet Regular; Cardiac, Daily Fluid Restriction; 1500 mL Fluid Per Day  ----------------------------------------------------------------------------------------------------------------------  Physical exam:  Constitutional:    HENT:  Head:  Normocephalic and atraumatic.    Eyes:  Conjunctivae and EOM are normal.  Pupils are equal, round, and reactive to light.  No scleral icterus.    Neck:  Neck supple.  No JVD present.    Cardiovascular: Normal rate, regular rhythm, S1 S2+, NO S3 / S4  Pulmonary/Chest:  Vesicular breath sounds B/L  Abdominal:  Soft.  Bowel sounds are normal.  No distension and no tenderness.       Neurological:  Alert and oriented to person, place, and time. No focal defecits  Skin:  Skin is warm and dry. No rash noted. No pallor.   Musculoskeletal:  No edema, no tenderness, and no deformity.  No red or swollen joints anywhere.   Peripheral vascular:  2+ Pulses B/L DP  ----------------------------------------------------------------------------------------------------------------------    ----------------------------------------------------------------------------------------------------------------------  Results from last 7 days   Lab Units 05/18/20  1844 05/18/20  1508 05/18/20  1322   TROPONIN T ng/mL <0.010 <0.010 <0.010     Results from last 7 days   Lab Units 05/21/20  0020 05/20/20  0125 05/19/20  0012 05/18/20  1322  05/18/20  1255   LACTATE mmol/L  --   --   --  1.6  --   --    WBC 10*3/mm3 7.54 7.24 6.53 8.05   < >  --    HEMOGLOBIN g/dL 14.5 15.9 14.7 15.6   < >  --    HEMATOCRIT % 45.3 48.5 45.3 47.8   < >  --    MCV fL 96.8 96.6 97.0 97.2*   < >  --    MCHC g/dL 32.0 32.8 32.5 32.6   < >  --    PLATELETS 10*3/mm3 96* 85* 69* 78*   < >  --    INR   --   --   --   --   --  1.10    < > = values in this interval not displayed.     Results from last 7 days   Lab Units 05/18/20  1540   PH, ARTERIAL pH units 7.424   PO2 ART mm Hg 96.7   PCO2, ARTERIAL mm Hg 25.5*   HCO3 ART mmol/L 16.7*     Results from last 7 days   Lab Units 05/21/20  0020 05/20/20  0125 05/19/20  0012 05/18/20  1508   SODIUM mmol/L 140 137 139  --    POTASSIUM mmol/L 3.7 4.2 4.7  --    MAGNESIUM mg/dL 1.7  --   --  1.9   CHLORIDE mmol/L 99 99 108*  --    CO2 mmol/L 27.5 21.0* 16.8*  --    BUN mg/dL 25* 26* 25*  --    CREATININE mg/dL 1.19 1.13 0.99  --    EGFR IF NONAFRICN AM mL/min/1.73 60* 64 75  --    CALCIUM mg/dL 8.7 9.0 8.5*  --    GLUCOSE mg/dL 95 114* 121*  --    ALBUMIN g/dL 3.18* 3.56 3.24*  --    BILIRUBIN mg/dL 1.4* 2.1* 1.9*  --    ALK PHOS U/L 110 137* 133*  --    AST (SGOT) U/L 20 30 52*  --    ALT (SGPT) U/L 35  50* 54*  --    Estimated Creatinine Clearance: 59.2 mL/min (by C-G formula based on SCr of 1.19 mg/dL).    No results found for: AMMONIA      Blood Culture   Date Value Ref Range Status   05/18/2020 No growth at 3 days  Preliminary   05/18/2020 No growth at 3 days  Preliminary     No results found for: URINECX  No results found for: WOUNDCX  No results found for: STOOLCX    I have personally looked at the labs and they are summarized above.  ----------------------------------------------------------------------------------------------------------------------  Imaging Results (Last 24 Hours)     Procedure Component Value Units Date/Time    US Abdomen Complete [[id number]] Collected:  05/21/20 0824     Updated:  05/21/20 0827    Narrative:       EXAMINATION: US ABDOMEN COMPLETE-         CLINICAL INDICATION:     thrombocytopenia; evaluate liver and spleen;  I48.91-Unspecified atrial fibrillation; I50.9-Heart failure, unspecified     TECHNIQUE: Multiplanar gray scale ultrasound of the abdomen.      COMPARISON: NONE      FINDINGS:   Visualized pancreas is unremarkable.   The gallbladder is unremarkable and with no shadowing stones, wall  thickening, or pericholecystic fluid.  The common bile duct measures 5 mm and is within normal limits. .  There is homogeneous echotexture of the liver. No focal lesion or  intrahepatic biliary dilatation identified.  Spleen is not enlarged measuring 5.4 cm in length without focal splenic  abnormality.   The RIGHT kidney measures 11.1 cm  in length without mass,  hydronephrosis, or shadowing stone.   The LEFT kidney measures 11.0 cm in length without mass, hydronephrosis,  or shadowing stone.   No ascites demonstrated.   IVC shows patency.  There is normal directional flow within the portal  vein.  Partially visualized aorta appears grossly normal in caliber.       Impression:       Unremarkable exam. Spleen and liver appear within normal  limits for size.      This report was finalized  on 5/21/2020 8:25 AM by Dr. Westley Youngblood MD.           ----------------------------------------------------------------------------------------------------------------------    Assessment:  Acute on chronic combined systolic and diastolic CHF, NYHA functional class III-IV, ACC AHA class C  Nonischemic cardiomyopathy with EF of 25 to 30%, likely tachycardia mediated.  Persistent A. fib, medical noncompliance  VHD - Moderate ,MR , Mild AI , likely from functional from CHF.   Thrombocytopenia, evaluation pending , Platelets improved today 90k  Chronic active tobacco use disorder   Chronix Etoh use disorder       Plan:  Change Amiodarone to  BID  Increase Eliquis to 5 bid  Increase Toprol to 25 qd   Cont with Entresto 24-26, unfortunately no room for BP   Cont with Lasix 40 IV BID, is continues to feel better and HR controlled, will D/C home on PO Lasix and plan for ECV after 6 weeks of OAC.       Jonas Cheek MD, [facility]  Interventional Cardiology        05/21/20  15:50

## 2020-05-21 NOTE — PLAN OF CARE
Problem: Patient Care Overview  Goal: Plan of Care Review  Outcome: Ongoing (interventions implemented as appropriate)  Flowsheets (Taken 5/21/2020 1816)  Progress: improving  Plan of Care Reviewed With: patient  Outcome Summary: Pt no longer on amiodarone gtt, switched to PO. Pt ambulated in almanzar today with no adverse signs or symptoms. Vital signs stable while pt remains in AFib with rate controlled at this time, will continue to monitor.

## 2020-05-21 NOTE — PLAN OF CARE
Problem: Patient Care Overview  Goal: Plan of Care Review  Outcome: Ongoing (interventions implemented as appropriate)  Flowsheets  Taken 5/21/2020 0418  Progress: no change  Taken 5/21/2020 0200  Plan of Care Reviewed With: patient  Note:   Pt compliant with NPO status @ MN. Pt amiodarone gtt infusing @ 1mg/min. Pt VSS. Will continue to monitor pt and report changes as necessary throughout shift.   Goal: Individualization and Mutuality  Outcome: Ongoing (interventions implemented as appropriate)  Goal: Discharge Needs Assessment  Outcome: Ongoing (interventions implemented as appropriate)  Goal: Interprofessional Rounds/Family Conf  Outcome: Ongoing (interventions implemented as appropriate)     Problem: Pain, Chronic (Adult)  Goal: Identify Related Risk Factors and Signs and Symptoms  Outcome: Ongoing (interventions implemented as appropriate)  Goal: Acceptable Pain/Comfort Level and Functional Ability  Outcome: Ongoing (interventions implemented as appropriate)     Problem: Skin Injury Risk (Adult)  Goal: Identify Related Risk Factors and Signs and Symptoms  Outcome: Ongoing (interventions implemented as appropriate)  Goal: Skin Health and Integrity  Outcome: Ongoing (interventions implemented as appropriate)     Problem: Fall Risk (Adult)  Goal: Identify Related Risk Factors and Signs and Symptoms  Outcome: Ongoing (interventions implemented as appropriate)  Goal: Absence of Fall  Outcome: Ongoing (interventions implemented as appropriate)

## 2020-05-21 NOTE — NURSING NOTE
"Patient ambulated in almanzar with nonskid socks while placed on a portable monitor. Pt ambulated independently approximately 288ft with no adverse effects. Heart rate 84  afib ambulatory, SpO2 96% on room air. No shortness of breath reported per pt. Mr. Mckeon reports \"it feels good to get out of bed and go for a good walk.\" Will continue to monitor.  "

## 2020-05-22 VITALS
HEIGHT: 67 IN | DIASTOLIC BLOOD PRESSURE: 93 MMHG | TEMPERATURE: 97.5 F | HEART RATE: 104 BPM | RESPIRATION RATE: 12 BRPM | SYSTOLIC BLOOD PRESSURE: 119 MMHG | WEIGHT: 157.5 LBS | BODY MASS INDEX: 24.72 KG/M2 | OXYGEN SATURATION: 100 %

## 2020-05-22 PROBLEM — I48.91 ATRIAL FIBRILLATION WITH RVR (HCC): Status: RESOLVED | Noted: 2020-05-18 | Resolved: 2020-05-22

## 2020-05-22 LAB
ANION GAP SERPL CALCULATED.3IONS-SCNC: 15.1 MMOL/L (ref 5–15)
BASOPHILS # BLD AUTO: 0.08 10*3/MM3 (ref 0–0.2)
BASOPHILS NFR BLD AUTO: 1 % (ref 0–1.5)
BUN BLD-MCNC: 27 MG/DL (ref 8–23)
BUN/CREAT SERPL: 22.1 (ref 7–25)
CALCIUM SPEC-SCNC: 8.3 MG/DL (ref 8.6–10.5)
CHLORIDE SERPL-SCNC: 97 MMOL/L (ref 98–107)
CO2 SERPL-SCNC: 28.9 MMOL/L (ref 22–29)
CREAT BLD-MCNC: 1.22 MG/DL (ref 0.76–1.27)
DEPRECATED RDW RBC AUTO: 53 FL (ref 37–54)
EOSINOPHIL # BLD AUTO: 0.18 10*3/MM3 (ref 0–0.4)
EOSINOPHIL NFR BLD AUTO: 2.3 % (ref 0.3–6.2)
ERYTHROCYTE [DISTWIDTH] IN BLOOD BY AUTOMATED COUNT: 15 % (ref 12.3–15.4)
GFR SERPL CREATININE-BSD FRML MDRD: 59 ML/MIN/1.73
GLUCOSE BLD-MCNC: 92 MG/DL (ref 65–99)
HCT VFR BLD AUTO: 49.9 % (ref 37.5–51)
HGB BLD-MCNC: 16.1 G/DL (ref 13–17.7)
IMM GRANULOCYTES # BLD AUTO: 0.05 10*3/MM3 (ref 0–0.05)
IMM GRANULOCYTES NFR BLD AUTO: 0.6 % (ref 0–0.5)
LYMPHOCYTES # BLD AUTO: 1.63 10*3/MM3 (ref 0.7–3.1)
LYMPHOCYTES NFR BLD AUTO: 20.8 % (ref 19.6–45.3)
MAGNESIUM SERPL-MCNC: 2 MG/DL (ref 1.6–2.4)
MCH RBC QN AUTO: 31.1 PG (ref 26.6–33)
MCHC RBC AUTO-ENTMCNC: 32.3 G/DL (ref 31.5–35.7)
MCV RBC AUTO: 96.5 FL (ref 79–97)
MONOCYTES # BLD AUTO: 1.18 10*3/MM3 (ref 0.1–0.9)
MONOCYTES NFR BLD AUTO: 15 % (ref 5–12)
NEUTROPHILS # BLD AUTO: 4.73 10*3/MM3 (ref 1.7–7)
NEUTROPHILS NFR BLD AUTO: 60.3 % (ref 42.7–76)
NRBC BLD AUTO-RTO: 0 /100 WBC (ref 0–0.2)
NT-PROBNP SERPL-MCNC: 5361 PG/ML (ref 5–900)
PLATELET # BLD AUTO: 129 10*3/MM3 (ref 140–450)
PMV BLD AUTO: 13 FL (ref 6–12)
POTASSIUM BLD-SCNC: 3.4 MMOL/L (ref 3.5–5.2)
RBC # BLD AUTO: 5.17 10*6/MM3 (ref 4.14–5.8)
SODIUM BLD-SCNC: 141 MMOL/L (ref 136–145)
WBC NRBC COR # BLD: 7.85 10*3/MM3 (ref 3.4–10.8)

## 2020-05-22 PROCEDURE — 94799 UNLISTED PULMONARY SVC/PX: CPT

## 2020-05-22 PROCEDURE — 83880 ASSAY OF NATRIURETIC PEPTIDE: CPT | Performed by: INTERNAL MEDICINE

## 2020-05-22 PROCEDURE — 83735 ASSAY OF MAGNESIUM: CPT | Performed by: INTERNAL MEDICINE

## 2020-05-22 PROCEDURE — 99232 SBSQ HOSP IP/OBS MODERATE 35: CPT | Performed by: INTERNAL MEDICINE

## 2020-05-22 PROCEDURE — 85025 COMPLETE CBC W/AUTO DIFF WBC: CPT | Performed by: INTERNAL MEDICINE

## 2020-05-22 PROCEDURE — 80048 BASIC METABOLIC PNL TOTAL CA: CPT | Performed by: INTERNAL MEDICINE

## 2020-05-22 PROCEDURE — 99239 HOSP IP/OBS DSCHRG MGMT >30: CPT | Performed by: INTERNAL MEDICINE

## 2020-05-22 RX ORDER — METOPROLOL SUCCINATE 25 MG/1
25 TABLET, EXTENDED RELEASE ORAL
Qty: 30 TABLET | Refills: 0 | Status: SHIPPED | OUTPATIENT
Start: 2020-05-23 | End: 2021-01-26

## 2020-05-22 RX ORDER — PANTOPRAZOLE SODIUM 40 MG/1
40 TABLET, DELAYED RELEASE ORAL DAILY
Qty: 30 TABLET | Refills: 0 | Status: SHIPPED | OUTPATIENT
Start: 2020-05-22

## 2020-05-22 RX ORDER — SPIRONOLACTONE 25 MG/1
12.5 TABLET ORAL DAILY
Qty: 30 TABLET | Refills: 0 | Status: SHIPPED | OUTPATIENT
Start: 2020-05-22

## 2020-05-22 RX ORDER — FUROSEMIDE 20 MG/1
20 TABLET ORAL 2 TIMES DAILY
Qty: 60 TABLET | Refills: 0 | Status: SHIPPED | OUTPATIENT
Start: 2020-05-22

## 2020-05-22 RX ORDER — ACETAMINOPHEN 325 MG/1
650 TABLET ORAL EVERY 6 HOURS PRN
Status: DISCONTINUED | OUTPATIENT
Start: 2020-05-22 | End: 2020-05-22 | Stop reason: HOSPADM

## 2020-05-22 RX ORDER — AMIODARONE HYDROCHLORIDE 200 MG/1
200 TABLET ORAL EVERY 12 HOURS SCHEDULED
Qty: 60 TABLET | Refills: 0 | Status: SHIPPED | OUTPATIENT
Start: 2020-05-22 | End: 2020-07-28

## 2020-05-22 RX ORDER — SPIRONOLACTONE 25 MG/1
12.5 TABLET ORAL DAILY
Status: DISCONTINUED | OUTPATIENT
Start: 2020-05-22 | End: 2020-05-22 | Stop reason: HOSPADM

## 2020-05-22 RX ADMIN — SPIRONOLACTONE 12.5 MG: 25 TABLET ORAL at 15:00

## 2020-05-22 RX ADMIN — APIXABAN 5 MG: 5 TABLET, FILM COATED ORAL at 10:00

## 2020-05-22 RX ADMIN — METOPROLOL SUCCINATE 25 MG: 25 TABLET, EXTENDED RELEASE ORAL at 10:00

## 2020-05-22 RX ADMIN — SACUBITRIL AND VALSARTAN 1 TABLET: 24; 26 TABLET, FILM COATED ORAL at 10:00

## 2020-05-22 RX ADMIN — DOCUSATE SODIUM 50MG AND SENNOSIDES 8.6MG 1 TABLET: 8.6; 5 TABLET, FILM COATED ORAL at 10:00

## 2020-05-22 RX ADMIN — SODIUM CHLORIDE, PRESERVATIVE FREE 10 ML: 5 INJECTION INTRAVENOUS at 10:00

## 2020-05-22 RX ADMIN — PANTOPRAZOLE SODIUM 40 MG: 40 TABLET, DELAYED RELEASE ORAL at 05:51

## 2020-05-22 RX ADMIN — ACETAMINOPHEN 650 MG: 325 TABLET ORAL at 02:57

## 2020-05-22 RX ADMIN — AMIODARONE HYDROCHLORIDE 200 MG: 200 TABLET ORAL at 09:59

## 2020-05-22 NOTE — PROGRESS NOTES
Discharge Planning Assessment   [place]     Patient Name: Yared Mckeon  MRN: [id number]  Today's Date: 5/22/2020    Admit Date: 5/18/2020    Discharge Needs Assessment    No documentation.       Discharge Plan     Row Name 05/22/20 9385       Plan    Plan  SS received consult to arrange life vest. SS spoke with [employer], per Suki Marin [phone] she has recieved info she needed through Epic for Life Vest. Once insurance approves device, a technician will bring device to patient at hospital.     Final Discharge Disposition Code  01 - home or self-care    Final Note  Pt being discharge home today. [employer] to bring life vest to hospital once insurance approves device.     Row Name 05/22/20 3974       Plan    Plan Comments  5/22:  VSS, Cardiac 1500 ml FR, R/A; IV Lasix; PBNP 5,316, K+ 3.4, Gap 15.1, B 27, GFR 59, Plt 129; Breathing improved, -9 L fluid balance, HR 70-80 resting &  ambulating, Per cardiology stable for DC home, request Tx to floor if not D/C'd today, Plan on OP Cardioversion, order for Life Vest-KLS         Rosa Namita

## 2020-05-22 NOTE — PLAN OF CARE
Problem: Patient Care Overview  Goal: Plan of Care Review  Outcome: Ongoing (interventions implemented as appropriate)  Flowsheets (Taken 5/22/2020 7200)  Progress: improving  Plan of Care Reviewed With: patient  Note:   Pt VSS. Pt up to chair and bathroom with no standby assistance required. Tolerating PO meds. Held Entresto d/t BP parameters not met. Pt 02 Saturation remained in 90's on RA. Will continue to monitor pt and report changes as necessary.   Goal: Individualization and Mutuality  Outcome: Ongoing (interventions implemented as appropriate)  Goal: Discharge Needs Assessment  Outcome: Ongoing (interventions implemented as appropriate)  Goal: Interprofessional Rounds/Family Conf  Outcome: Ongoing (interventions implemented as appropriate)     Problem: Pain, Chronic (Adult)  Goal: Identify Related Risk Factors and Signs and Symptoms  Outcome: Ongoing (interventions implemented as appropriate)  Goal: Acceptable Pain/Comfort Level and Functional Ability  Outcome: Ongoing (interventions implemented as appropriate)     Problem: Skin Injury Risk (Adult)  Goal: Identify Related Risk Factors and Signs and Symptoms  Outcome: Ongoing (interventions implemented as appropriate)  Goal: Skin Health and Integrity  Outcome: Ongoing (interventions implemented as appropriate)     Problem: Fall Risk (Adult)  Goal: Identify Related Risk Factors and Signs and Symptoms  Outcome: Ongoing (interventions implemented as appropriate)  Goal: Absence of Fall  Outcome: Ongoing (interventions implemented as appropriate)

## 2020-05-22 NOTE — PROGRESS NOTES
Patient Identification:  Name:  Yared Mckeon  Age:  70 y.o.  Sex:  male  :  1950  MRN:  [id number]  Visit Number:  [id number]    Chief Complaint:   Dyspnea    Subjective:    Patient was seen and examined today.  His breathing is improved  He is total cumulative fluid balance is -9 L.  Heart rate resting around 70s to 80s on ambulation 90s to 100s.  ----------------------------------------------------------------------------------------------------------------------  Current Hospital Meds:    amiodarone 200 mg Oral Q12H   apixaban 5 mg Oral Q12H   furosemide 40 mg Intravenous Q12H   metoprolol succinate XL 25 mg Oral Q24H   pantoprazole 40 mg Oral Q AM   sacubitril-valsartan 1 tablet Oral Q12H   sennosides-docusate 1 tablet Oral BID   sodium chloride 10 mL Intravenous Q12H   spironolactone 12.5 mg Oral Daily        ----------------------------------------------------------------------------------------------------------------------  Vital Signs:  Temp:  [97.6 °F (36.4 °C)-98.3 °F (36.8 °C)] 97.6 °F (36.4 °C)  Heart Rate:  [] 84  Resp:  [14-22] 20  BP: ()/(62-93) 104/79      05/20/20   Weight: 72.1 kg (159 lb) 72.5 kg (159 lb 12.8 oz) 71.4 kg (157 lb 8 oz)     Body mass index is 24.67 kg/m².    Intake/Output Summary (Last 24 hours) at 2020 1302  Last data filed at 2020 0815  Gross per 24 hour   Intake 1152.43 ml   Output 1625 ml   Net -472.57 ml     Diet Regular; Cardiac, Daily Fluid Restriction; 1500 mL Fluid Per Day  ----------------------------------------------------------------------------------------------------------------------  Physical exam:  Constitutional:    HENT:  Head:  Normocephalic and atraumatic.    Eyes:  Conjunctivae and EOM are normal.  Pupils are equal, round, and reactive to light.  No scleral icterus.    Neck:  Neck supple.  No JVD present.    Cardiovascular: Normal rate, regular rhythm, S1 S2+, NO S3 / S4  Pulmonary/Chest:   Vesicular breath sounds B/L  Abdominal:  Soft.  Bowel sounds are normal.  No distension and no tenderness.      Neurological:  Alert and oriented to person, place, and time. No focal defecits  Skin:  Skin is warm and dry. No rash noted. No pallor.   Musculoskeletal:  No edema, no tenderness, and no deformity.  No red or swollen joints anywhere.   Peripheral vascular:  2+ Pulses B/L DP  ----------------------------------------------------------------------------------------------------------------------    ----------------------------------------------------------------------------------------------------------------------  Results from last 7 days   Lab Units 05/18/20  1844 05/18/20  1508 05/18/20  1322   TROPONIN T ng/mL <0.010 <0.010 <0.010     Results from last 7 days   Lab Units 05/22/20  0314 05/21/20  0020 05/20/20  0125  05/18/20  1322 05/18/20  1255   LACTATE mmol/L  --   --   --   --  1.6  --    WBC 10*3/mm3 7.85 7.54 7.24   < > 8.05  --    HEMOGLOBIN g/dL 16.1 14.5 15.9   < > 15.6  --    HEMATOCRIT % 49.9 45.3 48.5   < > 47.8  --    MCV fL 96.5 96.8 96.6   < > 97.2*  --    MCHC g/dL 32.3 32.0 32.8   < > 32.6  --    PLATELETS 10*3/mm3 129* 96* 85*   < > 78*  --    INR   --   --   --   --   --  1.10    < > = values in this interval not displayed.     Results from last 7 days   Lab Units 05/18/20  1540   PH, ARTERIAL pH units 7.424   PO2 ART mm Hg 96.7   PCO2, ARTERIAL mm Hg 25.5*   HCO3 ART mmol/L 16.7*     Results from last 7 days   Lab Units 05/22/20  0314 05/21/20  0020 05/20/20  0125 05/19/20  0012 05/18/20  1508   SODIUM mmol/L 141 140 137 139  --    POTASSIUM mmol/L 3.4* 3.7 4.2 4.7  --    MAGNESIUM mg/dL 2.0 1.7  --   --  1.9   CHLORIDE mmol/L 97* 99 99 108*  --    CO2 mmol/L 28.9 27.5 21.0* 16.8*  --    BUN mg/dL 27* 25* 26* 25*  --    CREATININE mg/dL 1.22 1.19 1.13 0.99  --    EGFR IF NONAFRICN AM mL/min/1.73 59* 60* 64 75  --    CALCIUM mg/dL 8.3* 8.7 9.0 8.5*  --    GLUCOSE mg/dL 92 95 114* 121*   --    ALBUMIN g/dL  --  3.18* 3.56 3.24*  --    BILIRUBIN mg/dL  --  1.4* 2.1* 1.9*  --    ALK PHOS U/L  --  110 137* 133*  --    AST (SGOT) U/L  --  20 30 52*  --    ALT (SGPT) U/L  --  35 50* 54*  --    Estimated Creatinine Clearance: 56.9 mL/min (by C-G formula based on SCr of 1.22 mg/dL).    No results found for: AMMONIA      Blood Culture   Date Value Ref Range Status   05/18/2020 No growth at 3 days  Preliminary   05/18/2020 No growth at 3 days  Preliminary     No results found for: URINECX  No results found for: WOUNDCX  No results found for: STOOLCX    I have personally looked at the labs and they are summarized above.  ----------------------------------------------------------------------------------------------------------------------  Imaging Results (Last 24 Hours)     ** No results found for the last 24 hours. **        ----------------------------------------------------------------------------------------------------------------------    Assessment:  Acute on chronic combined systolic and diastolic CHF, NYHA functional class III-IV, ACC AHA class C  Nonischemic cardiomyopathy with EF of 25 to 30%, likely tachycardia mediated.  Persistent A. fib, medical noncompliance  VHD - Moderate ,MR , Mild AI , likely from functional from CHF.   Thrombocytopenia, evaluation pending , Platelets improved today 90k  Chronic active tobacco use disorder   Chronix Etoh use disorder    Plan:  He is stable for discharge home, follow-up with me in 2 weeks, recommend a BMP check in 1 week.  We will recommend Lasix 20 mg p.o. twice daily at home.  Will add Aldactone 12.5 mg p.o. daily.  Continue with amiodarone to  BID  Continue with Eliquis to 5 bid  Continue with Toprol to 25 qd   Continue with with Entresto 24-26, unfortunately no room for BP   LifeVest for severe dilated cardiomyopathy  We will plan on outpatient cardioversion.  Discussed with Dr. Helen Cheek MD, FACC  Interventional  Cardiology        05/22/20  13:02
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
## 2020-05-22 NOTE — PLAN OF CARE
Problem: Patient Care Overview  Goal: Plan of Care Review  Outcome: Ongoing (interventions implemented as appropriate)  Flowsheets (Taken 5/22/2020 1711)  Progress: improving  Plan of Care Reviewed With: patient; spouse  Goal: Individualization and Mutuality  Outcome: Ongoing (interventions implemented as appropriate)  Goal: Discharge Needs Assessment  Outcome: Ongoing (interventions implemented as appropriate)  Flowsheets (Taken 5/22/2020 1711)  Equipment Needed After Discharge: -- (life vest)  Equipment Currently Used at Home: oxygen; other (see comments) (Life vest)  Anticipated Changes Related to Illness: none  Transportation Anticipated: family or friend will provide  Transportation Concerns: car, none  Readmission Within the Last 30 Days: no previous admission in last 30 days  Patient/Family Anticipated Services at Transition: none  Patient/Family Anticipates Transition to: home with family  Goal: Interprofessional Rounds/Family Conf  Outcome: Ongoing (interventions implemented as appropriate)  Flowsheets (Taken 5/22/2020 1711)  Participants: dietitian/nutrition services; family; nursing; pharmacy; patient; physician; respiratory therapy     Problem: Pain, Chronic (Adult)  Goal: Identify Related Risk Factors and Signs and Symptoms  Outcome: Ongoing (interventions implemented as appropriate)  Flowsheets (Taken 5/22/2020 1711)  Related Risk Factors (Chronic Pain): disease process  Signs and Symptoms (Chronic Pain): fatigue/weakness  Goal: Acceptable Pain/Comfort Level and Functional Ability  Outcome: Ongoing (interventions implemented as appropriate)     Problem: Skin Injury Risk (Adult)  Goal: Identify Related Risk Factors and Signs and Symptoms  Outcome: Ongoing (interventions implemented as appropriate)  Goal: Skin Health and Integrity  Outcome: Ongoing (interventions implemented as appropriate)     Problem: Fall Risk (Adult)  Goal: Identify Related Risk Factors and Signs and Symptoms  Outcome: Ongoing  (interventions implemented as appropriate)  Goal: Absence of Fall  Outcome: Ongoing (interventions implemented as appropriate)

## 2020-05-22 NOTE — PHARMACY PATIENT ASSISTANCE
Pharmacy reviewed cost for Yared Mckeon on Entresto.  Prescription required a PA, which has been approved.  Copay is $3.90 for a 30-day supply.  Pharmacy also profiled a trial card while PA was pending.  No issues identified at this time.    Thank you,  Kings Dickey, Pharmacy Intern  05/22/20  09:46

## 2020-05-23 ENCOUNTER — READMISSION MANAGEMENT (OUTPATIENT)
Dept: CALL CENTER | Facility: HOSPITAL | Age: 70
End: 2020-05-23

## 2020-05-23 LAB
BACTERIA SPEC AEROBE CULT: NORMAL
BACTERIA SPEC AEROBE CULT: NORMAL

## 2020-05-23 NOTE — PAYOR COMM NOTE
"[facility]   MARCOS BLACK  PHONE  [phone]  FAX  [phone]  NPI:  [id number]    PATIENT D/C 5/22/2020    Yared Ibarra (70 y.o. Male)     Date of Birth Social Security Number Address Home Phone MRN    1950  [address] [place] [id number] [phone] [id number]    Gn[employer]ist Marital Status          Pent[employer]ostal        Admission Date Admission Type Admitting Provider Attending Provider Department, Room/Bed    5/18/20 Emergency Rose Cervantes DO  [facility] PROGRESS CARE, P210/S2    Discharge Date Discharge Disposition Discharge Destination        5/22/2020 Home or Self Care Home             Attending Provider:  (none)   Allergies:  No Known Allergies    Isolation:  None   Infection:  None   Code Status:  Prior    Ht:  170.2 cm (67\")   Wt:  71.4 kg (157 lb 8 oz)    Admission Cmt:  None   Principal Problem:  Atrial fibrillation with RVR (CMS/HCC) [I48.91]                 Active Insurance as of 5/18/2020     Primary Coverage     Payor Plan Insurance Group Employer/Plan Group    ANTHEM MEDICARE REPLACEMENT ANTHEM MEDICARE ADVANTAGE [id number]     Payor Plan Address Payor Plan Phone Number Payor Plan Fax Number Effective Dates    [address] [phone]  1/1/2020 - None Entered    [facility] [id number]       Subscriber Name Subscriber Birth Date Member ID       YARED IBARRA 1950 [id number]                 Emergency Contacts      (Rel.) Home Phone Work Phone Mobile Phone    Amber Joshua (Sister) [phone] -- --    adan Juarez -- -- [phone]              "

## 2020-05-23 NOTE — OUTREACH NOTE
Prep Survey      Responses   [facility] facility patient discharged from?  [facility]   Is LACE score < 7 ?  No   Eligibility  Readm Mgmt   Discharge diagnosis  Atrial fibrillation, presenting with rapid ventricular response   Does the patient have one of the following disease processes/diagnoses(primary or secondary)?  CHF   Does the patient have Home health ordered?  No   Is there a DME ordered?  Yes   What DME was ordered?  [employer] lifevest    Medication alerts for this patient  Amiodarone, Eliquis, Aldactone, Lasix    General alerts for this patient  Medical noncompliance that adversely affects all aspects of care per MD note   Prep survey completed?  Yes          Sasha Santos RN

## 2020-05-26 ENCOUNTER — READMISSION MANAGEMENT (OUTPATIENT)
Dept: CALL CENTER | Facility: HOSPITAL | Age: 70
End: 2020-05-26

## 2020-05-26 NOTE — OUTREACH NOTE
CHF Week 1 Survey      Responses   [facility] patient discharged from?  [facility]   Does the patient have one of the following disease processes/diagnoses(primary or secondary)?  CHF   Is there a successful TCM telephone encounter documented?  No   CHF Week 1 attempt successful?  No   Unsuccessful attempts  Attempt 1          Sara Silver RN

## 2020-05-27 ENCOUNTER — READMISSION MANAGEMENT (OUTPATIENT)
Dept: CALL CENTER | Facility: HOSPITAL | Age: 70
End: 2020-05-27

## 2020-05-27 NOTE — OUTREACH NOTE
CHF Week 1 Survey      Responses   [facility] patient discharged from?  [facility]   Does the patient have one of the following disease processes/diagnoses(primary or secondary)?  CHF   Is there a successful TCM telephone encounter documented?  No   CHF Week 1 attempt successful?  Yes   Call start time  1441   Call end time  1446   General alerts for this patient  Medical noncompliance that adversely affects all aspects of care per MD note   Discharge diagnosis  Atrial fibrillation, presenting with rapid ventricular response   Meds reviewed with patient/caregiver?  Yes   Is the patient having any side effects they believe may be caused by any medication additions or changes?  No   Does the patient have all medications ordered at discharge?  Yes   Is the patient taking all medications as directed (includes completed medication regime)?  Yes   Comments regarding appointments  cardiology appt 6/3/20   Does the patient have a primary care provider?   Yes   Comments regarding PCP  Encouraged pt to schedule PCP appt   Has the patient kept scheduled appointments due by today?  N/A   What DME was ordered?  [employer] lifevest    Has all DME been delivered?  Yes   Psychosocial issues?  No   Did the patient receive a copy of their discharge instructions?  Yes   Nursing interventions  Reviewed instructions with patient   What is the patient's perception of their health status since discharge?  Improving   Nursing interventions  Nurse provided patient education   Is the patient weighing daily?  No   Does the patient have scales?  Yes   Daily weight interventions  Education provided on importance of daily weight   Is the patient able to teach back Heart Failure diet management?  Yes   Is the patient able to teach back Heart Failure Zones?  Yes   Is the patient able to teach back signs and symptoms of worsening condition? (i.e. weight gain, shortness of air, etc.)  Yes   If the patient is a current smoker, are they able to teach back  resources for cessation?  Smoking cessation medications [Pt is a smoker]   Is the patient/caregiver able to teach back the hierarchy of who to call/visit for symptoms/problems? PCP, Specialist, Home health nurse, Urgent Care, ED, 911  Yes    CHF Week 1 call completed?  Yes          Stormy Martin RN

## 2020-06-02 ENCOUNTER — READMISSION MANAGEMENT (OUTPATIENT)
Dept: CALL CENTER | Facility: HOSPITAL | Age: 70
End: 2020-06-02

## 2020-06-02 NOTE — OUTREACH NOTE
CHF Week 2 Survey      Responses   [facility] patient discharged from?  [facility]   Does the patient have one of the following disease processes/diagnoses(primary or secondary)?  CHF   Week 2 attempt successful?  No   Unsuccessful attempts  Attempt 1          Stormy Martin RN

## 2020-06-03 ENCOUNTER — OFFICE VISIT (OUTPATIENT)
Dept: CARDIOLOGY | Facility: CLINIC | Age: 70
End: 2020-06-03

## 2020-06-03 ENCOUNTER — READMISSION MANAGEMENT (OUTPATIENT)
Dept: CALL CENTER | Facility: HOSPITAL | Age: 70
End: 2020-06-03

## 2020-06-03 VITALS
SYSTOLIC BLOOD PRESSURE: 104 MMHG | HEIGHT: 67 IN | BODY MASS INDEX: 24.3 KG/M2 | DIASTOLIC BLOOD PRESSURE: 71 MMHG | OXYGEN SATURATION: 99 % | HEART RATE: 88 BPM | WEIGHT: 154.8 LBS

## 2020-06-03 DIAGNOSIS — I50.22 CHRONIC SYSTOLIC CONGESTIVE HEART FAILURE (HCC): Primary | ICD-10-CM

## 2020-06-03 DIAGNOSIS — I48.92 ATRIAL FLUTTER, UNSPECIFIED TYPE (HCC): ICD-10-CM

## 2020-06-03 DIAGNOSIS — R06.09 DYSPNEA ON EXERTION: ICD-10-CM

## 2020-06-03 DIAGNOSIS — I42.8 NON-ISCHEMIC CARDIOMYOPATHY (HCC): ICD-10-CM

## 2020-06-03 PROCEDURE — 93000 ELECTROCARDIOGRAM COMPLETE: CPT | Performed by: INTERNAL MEDICINE

## 2020-06-03 PROCEDURE — 99214 OFFICE O/P EST MOD 30 MIN: CPT | Performed by: INTERNAL MEDICINE
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
## 2020-06-03 NOTE — OUTREACH NOTE
CHF Week 2 Survey      Responses   [facility] patient discharged from?  [facility]   Does the patient have one of the following disease processes/diagnoses(primary or secondary)?  CHF   Week 2 attempt successful?  No   Unsuccessful attempts  Attempt 2          Kelli Arreola RN

## 2020-06-10 ENCOUNTER — READMISSION MANAGEMENT (OUTPATIENT)
Dept: CALL CENTER | Facility: HOSPITAL | Age: 70
End: 2020-06-10

## 2020-06-10 NOTE — OUTREACH NOTE
CHF Week 3 Survey      Responses   [facility] patient discharged from?  [facility]   Does the patient have one of the following disease processes/diagnoses(primary or secondary)?  CHF   Week 3 attempt successful?  Yes   Call start time  1603   Call end time  1607   General alerts for this patient  Medical noncompliance that adversely affects all aspects of care per MD note   Discharge diagnosis  Atrial fibrillation, presenting with rapid ventricular response   Is patient permission given to speak with other caregiver?  Yes   List who call center can speak with  sisterEvette Clark or wife Ludmila Spauldings reviewed with patient/caregiver?  Yes   Is the patient taking all medications as directed (includes completed medication regime)?  Yes   Has the patient kept scheduled appointments due by today?  Yes   What DME was ordered?  [employer]    Psychosocial issues?  No   Comments  He reports SOA is better. He is feeling better. Denies LE edema. Monitoring BP at home, 104/71   What is the patient's perception of their health status since discharge?  Improving   Is the patient weighing daily?  Yes   Does the patient have scales?  Yes   Daily weight interventions  Education provided on importance of daily weight   Is the patient able to teach back Heart Failure diet management?  Yes   Is the patient able to teach back Heart Failure Zones?  Yes   Is the patient/caregiver able to teach back the hierarchy of who to call/visit for symptoms/problems? PCP, Specialist, Home health nurse, Urgent Care, ED, 911  Yes   CHF Week 3 call completed?  Yes          Sara Silver RN

## 2020-06-17 ENCOUNTER — READMISSION MANAGEMENT (OUTPATIENT)
Dept: CALL CENTER | Facility: HOSPITAL | Age: 70
End: 2020-06-17

## 2020-06-17 NOTE — OUTREACH NOTE
CHF Week 4 Survey      Responses   [facility] patient discharged from?  [facility]   Does the patient have one of the following disease processes/diagnoses(primary or secondary)?  CHF   Week 4 attempt successful?  No          Letty Sterling RN

## 2020-06-19 ENCOUNTER — HOSPITAL ENCOUNTER (OUTPATIENT)
Dept: CARDIOLOGY | Facility: HOSPITAL | Age: 70
Discharge: HOME | End: 2020-06-19

## 2020-06-19 DIAGNOSIS — I50.22 CHRONIC SYSTOLIC CONGESTIVE HEART FAILURE (HCC): ICD-10-CM

## 2020-06-19 DIAGNOSIS — R06.09 DYSPNEA ON EXERTION: ICD-10-CM

## 2020-06-19 DIAGNOSIS — I48.92 ATRIAL FLUTTER, UNSPECIFIED TYPE (HCC): ICD-10-CM

## 2020-07-24 ENCOUNTER — HOSPITAL ENCOUNTER (OUTPATIENT)
Dept: CARDIOLOGY | Facility: HOSPITAL | Age: 70
Discharge: HOME | End: 2020-07-24
Admitting: INTERNAL MEDICINE

## 2020-07-24 PROCEDURE — 93306 TTE W/DOPPLER COMPLETE: CPT | Performed by: INTERNAL MEDICINE

## 2020-07-24 PROCEDURE — 93306 TTE W/DOPPLER COMPLETE: CPT

## 2020-07-27 LAB
MAXIMAL PREDICTED HEART RATE: 150 BPM
STRESS TARGET HR: 128 BPM

## 2020-07-28 ENCOUNTER — LAB (OUTPATIENT)
Dept: LAB | Facility: HOSPITAL | Age: 70
End: 2020-07-28

## 2020-07-28 ENCOUNTER — OFFICE VISIT (OUTPATIENT)
Dept: CARDIOLOGY | Facility: CLINIC | Age: 70
End: 2020-07-28

## 2020-07-28 VITALS
HEIGHT: 67 IN | OXYGEN SATURATION: 91 % | SYSTOLIC BLOOD PRESSURE: 101 MMHG | BODY MASS INDEX: 24.64 KG/M2 | DIASTOLIC BLOOD PRESSURE: 65 MMHG | HEART RATE: 58 BPM | WEIGHT: 157 LBS

## 2020-07-28 DIAGNOSIS — I50.22 CHRONIC SYSTOLIC CONGESTIVE HEART FAILURE (HCC): ICD-10-CM

## 2020-07-28 DIAGNOSIS — R06.09 DYSPNEA ON EXERTION: ICD-10-CM

## 2020-07-28 DIAGNOSIS — I42.8 NON-ISCHEMIC CARDIOMYOPATHY (HCC): ICD-10-CM

## 2020-07-28 DIAGNOSIS — I48.92 ATRIAL FLUTTER, UNSPECIFIED TYPE (HCC): Primary | ICD-10-CM

## 2020-07-28 DIAGNOSIS — I48.92 ATRIAL FLUTTER, UNSPECIFIED TYPE (HCC): ICD-10-CM

## 2020-07-28 PROCEDURE — 83880 ASSAY OF NATRIURETIC PEPTIDE: CPT

## 2020-07-28 PROCEDURE — 80053 COMPREHEN METABOLIC PANEL: CPT

## 2020-07-28 PROCEDURE — 93000 ELECTROCARDIOGRAM COMPLETE: CPT | Performed by: INTERNAL MEDICINE

## 2020-07-28 PROCEDURE — 36415 COLL VENOUS BLD VENIPUNCTURE: CPT

## 2020-07-28 PROCEDURE — 99214 OFFICE O/P EST MOD 30 MIN: CPT | Performed by: INTERNAL MEDICINE

## 2020-07-28 RX ORDER — AMIODARONE HYDROCHLORIDE 200 MG/1
200 TABLET ORAL DAILY
COMMUNITY
End: 2020-07-28 | Stop reason: SDUPTHER

## 2020-07-28 RX ORDER — AMIODARONE HYDROCHLORIDE 200 MG/1
200 TABLET ORAL DAILY
Qty: 30 TABLET | Refills: 5 | Status: SHIPPED | OUTPATIENT
Start: 2020-07-28 | End: 2020-09-21 | Stop reason: HOSPADM

## 2020-07-29 LAB
ALBUMIN SERPL-MCNC: 3.7 G/DL (ref 3.5–5.2)
ALBUMIN/GLOB SERPL: 1.4 G/DL
ALP SERPL-CCNC: 69 U/L (ref 39–117)
ALT SERPL W P-5'-P-CCNC: 19 U/L (ref 1–41)
ANION GAP SERPL CALCULATED.3IONS-SCNC: 9.2 MMOL/L (ref 5–15)
AST SERPL-CCNC: 20 U/L (ref 1–40)
BILIRUB SERPL-MCNC: 1 MG/DL (ref 0–1.2)
BUN SERPL-MCNC: 18 MG/DL (ref 8–23)
BUN/CREAT SERPL: 15.7 (ref 7–25)
CALCIUM SPEC-SCNC: 9.1 MG/DL (ref 8.6–10.5)
CHLORIDE SERPL-SCNC: 104 MMOL/L (ref 98–107)
CO2 SERPL-SCNC: 26.8 MMOL/L (ref 22–29)
CREAT SERPL-MCNC: 1.15 MG/DL (ref 0.76–1.27)
GFR SERPL CREATININE-BSD FRML MDRD: 63 ML/MIN/1.73
GLOBULIN UR ELPH-MCNC: 2.7 GM/DL
GLUCOSE SERPL-MCNC: 123 MG/DL (ref 65–99)
NT-PROBNP SERPL-MCNC: 776.3 PG/ML (ref 0–900)
POTASSIUM SERPL-SCNC: 4.1 MMOL/L (ref 3.5–5.2)
PROT SERPL-MCNC: 6.4 G/DL (ref 6–8.5)
SODIUM SERPL-SCNC: 140 MMOL/L (ref 136–145)

## 2020-08-17 ENCOUNTER — TELEPHONE (OUTPATIENT)
Dept: CARDIOLOGY | Facility: CLINIC | Age: 70
End: 2020-08-17

## 2020-08-17 NOTE — TELEPHONE ENCOUNTER
Called Yared to confirm that he was made aware that his EF had improved and he no longer needs to wear LifeVest. He states he was made aware on 7/28/20 by Dr. Lynne and had already shipped LifeVest back. I told him I would document this in his chart.

## 2020-08-18 ENCOUNTER — TELEPHONE (OUTPATIENT)
Dept: CARDIOLOGY | Facility: CLINIC | Age: 70
End: 2020-08-18

## 2020-08-18 NOTE — TELEPHONE ENCOUNTER
----- Message from Jonas Cheek MD sent at 8/15/2020  4:45 PM EDT -----  Please call patient to and tell him his test was normal.   Thanks.

## 2020-08-18 NOTE — TELEPHONE ENCOUNTER
Called Yared to let him know Dr. Lynne had reviewed BNP and that it was normal. He is aware and understands.

## 2020-09-04 ENCOUNTER — CONSULT (OUTPATIENT)
Dept: CARDIOLOGY | Facility: CLINIC | Age: 70
End: 2020-09-04

## 2020-09-04 VITALS
HEART RATE: 65 BPM | HEIGHT: 67 IN | TEMPERATURE: 97.8 F | DIASTOLIC BLOOD PRESSURE: 62 MMHG | SYSTOLIC BLOOD PRESSURE: 110 MMHG | WEIGHT: 154 LBS | BODY MASS INDEX: 24.17 KG/M2

## 2020-09-04 DIAGNOSIS — I48.0 PAROXYSMAL ATRIAL FIBRILLATION (HCC): ICD-10-CM

## 2020-09-04 DIAGNOSIS — I48.3 TYPICAL ATRIAL FLUTTER (HCC): Primary | ICD-10-CM

## 2020-09-04 DIAGNOSIS — I50.22 CHRONIC SYSTOLIC CONGESTIVE HEART FAILURE (HCC): ICD-10-CM

## 2020-09-04 DIAGNOSIS — Z79.899 LONG TERM CURRENT USE OF ANTIARRHYTHMIC MEDICAL THERAPY: ICD-10-CM

## 2020-09-04 PROCEDURE — 99205 OFFICE O/P NEW HI 60 MIN: CPT | Performed by: INTERNAL MEDICINE

## 2020-09-04 PROCEDURE — 93000 ELECTROCARDIOGRAM COMPLETE: CPT | Performed by: INTERNAL MEDICINE

## 2020-09-17 ENCOUNTER — TRANSCRIBE ORDERS (OUTPATIENT)
Dept: ADMINISTRATIVE | Facility: HOSPITAL | Age: 70
End: 2020-09-17

## 2020-09-17 ENCOUNTER — PREP FOR SURGERY (OUTPATIENT)
Dept: OTHER | Facility: HOSPITAL | Age: 70
End: 2020-09-17

## 2020-09-17 DIAGNOSIS — Z01.818 PREOP EXAMINATION: Primary | ICD-10-CM

## 2020-09-18 ENCOUNTER — LAB (OUTPATIENT)
Dept: LAB | Facility: HOSPITAL | Age: 70
End: 2020-09-18

## 2020-09-18 DIAGNOSIS — I48.3 TYPICAL ATRIAL FLUTTER (HCC): ICD-10-CM

## 2020-09-18 DIAGNOSIS — Z01.818 PREOP EXAMINATION: ICD-10-CM

## 2020-09-18 PROCEDURE — U0004 COV-19 TEST NON-CDC HGH THRU: HCPCS

## 2020-09-18 PROCEDURE — 85027 COMPLETE CBC AUTOMATED: CPT

## 2020-09-18 PROCEDURE — 36415 COLL VENOUS BLD VENIPUNCTURE: CPT

## 2020-09-18 PROCEDURE — C9803 HOPD COVID-19 SPEC COLLECT: HCPCS

## 2020-09-18 PROCEDURE — 80053 COMPREHEN METABOLIC PANEL: CPT

## 2020-09-19 LAB
ALBUMIN SERPL-MCNC: 4.2 G/DL (ref 3.5–5.2)
ALBUMIN/GLOB SERPL: 1.5 G/DL
ALP SERPL-CCNC: 87 U/L (ref 39–117)
ALT SERPL W P-5'-P-CCNC: 13 U/L (ref 1–41)
ANION GAP SERPL CALCULATED.3IONS-SCNC: 9.5 MMOL/L (ref 5–15)
AST SERPL-CCNC: 16 U/L (ref 1–40)
BILIRUB SERPL-MCNC: 1 MG/DL (ref 0–1.2)
BUN SERPL-MCNC: 31 MG/DL (ref 8–23)
BUN/CREAT SERPL: 23.5 (ref 7–25)
CALCIUM SPEC-SCNC: 9 MG/DL (ref 8.6–10.5)
CHLORIDE SERPL-SCNC: 105 MMOL/L (ref 98–107)
CO2 SERPL-SCNC: 26.5 MMOL/L (ref 22–29)
CREAT SERPL-MCNC: 1.32 MG/DL (ref 0.76–1.27)
DEPRECATED RDW RBC AUTO: 53.3 FL (ref 37–54)
ERYTHROCYTE [DISTWIDTH] IN BLOOD BY AUTOMATED COUNT: 14.6 % (ref 12.3–15.4)
GFR SERPL CREATININE-BSD FRML MDRD: 54 ML/MIN/1.73
GLOBULIN UR ELPH-MCNC: 2.8 GM/DL
GLUCOSE SERPL-MCNC: 115 MG/DL (ref 65–99)
HCT VFR BLD AUTO: 44.4 % (ref 37.5–51)
HGB BLD-MCNC: 14.7 G/DL (ref 13–17.7)
MCH RBC QN AUTO: 32.9 PG (ref 26.6–33)
MCHC RBC AUTO-ENTMCNC: 33.1 G/DL (ref 31.5–35.7)
MCV RBC AUTO: 99.3 FL (ref 79–97)
PLATELET # BLD AUTO: 141 10*3/MM3 (ref 140–450)
PMV BLD AUTO: 12.1 FL (ref 6–12)
POTASSIUM SERPL-SCNC: 4.5 MMOL/L (ref 3.5–5.2)
PROT SERPL-MCNC: 7 G/DL (ref 6–8.5)
RBC # BLD AUTO: 4.47 10*6/MM3 (ref 4.14–5.8)
SARS-COV-2 RNA NOSE QL NAA+PROBE: NOT DETECTED
SODIUM SERPL-SCNC: 141 MMOL/L (ref 136–145)
WBC # BLD AUTO: 7.09 10*3/MM3 (ref 3.4–10.8)

## 2020-09-21 ENCOUNTER — HOSPITAL ENCOUNTER (OUTPATIENT)
Facility: HOSPITAL | Age: 70
Discharge: HOME | End: 2020-09-21
Attending: INTERNAL MEDICINE | Admitting: INTERNAL MEDICINE

## 2020-09-21 ENCOUNTER — TRANSCRIBE ORDERS (OUTPATIENT)
Dept: ADMINISTRATIVE | Facility: HOSPITAL | Age: 70
End: 2020-09-21

## 2020-09-21 VITALS
HEART RATE: 47 BPM | OXYGEN SATURATION: 99 % | BODY MASS INDEX: 23.53 KG/M2 | HEIGHT: 67 IN | WEIGHT: 149.91 LBS | RESPIRATION RATE: 18 BRPM | SYSTOLIC BLOOD PRESSURE: 112 MMHG | DIASTOLIC BLOOD PRESSURE: 73 MMHG | TEMPERATURE: 97 F

## 2020-09-21 DIAGNOSIS — I48.3 TYPICAL ATRIAL FLUTTER (HCC): ICD-10-CM

## 2020-09-21 DIAGNOSIS — Z01.818 PREOP EXAMINATION: Primary | ICD-10-CM

## 2020-09-21 PROCEDURE — 93609 INTRA-VNTR MAPG TCHYCAR SITE: CPT | Performed by: INTERNAL MEDICINE

## 2020-09-21 PROCEDURE — S0260 H&P FOR SURGERY: HCPCS | Performed by: NURSE PRACTITIONER

## 2020-09-21 PROCEDURE — C1894 INTRO/SHEATH, NON-LASER: HCPCS | Performed by: INTERNAL MEDICINE

## 2020-09-21 PROCEDURE — 93653 COMPRE EP EVAL TX SVT: CPT | Performed by: INTERNAL MEDICINE

## 2020-09-21 PROCEDURE — 99153 MOD SED SAME PHYS/QHP EA: CPT | Performed by: INTERNAL MEDICINE

## 2020-09-21 PROCEDURE — 93621 COMP EP EVL L PAC&REC C SINS: CPT | Performed by: INTERNAL MEDICINE

## 2020-09-21 PROCEDURE — 25010000002 FENTANYL CITRATE (PF) 100 MCG/2ML SOLUTION: Performed by: INTERNAL MEDICINE

## 2020-09-21 PROCEDURE — C1731 CATH, EP, 20 OR MORE ELEC: HCPCS | Performed by: INTERNAL MEDICINE

## 2020-09-21 PROCEDURE — 99152 MOD SED SAME PHYS/QHP 5/>YRS: CPT | Performed by: INTERNAL MEDICINE

## 2020-09-21 PROCEDURE — C1730 CATH, EP, 19 OR FEW ELECT: HCPCS | Performed by: INTERNAL MEDICINE

## 2020-09-21 PROCEDURE — 25010000003 LIDOCAINE 1 % SOLUTION: Performed by: INTERNAL MEDICINE

## 2020-09-21 PROCEDURE — 93005 ELECTROCARDIOGRAM TRACING: CPT | Performed by: INTERNAL MEDICINE

## 2020-09-21 PROCEDURE — 25010000002 MIDAZOLAM PER 1 MG: Performed by: INTERNAL MEDICINE

## 2020-09-21 PROCEDURE — C1733 CATH, EP, OTHR THAN COOL-TIP: HCPCS | Performed by: INTERNAL MEDICINE

## 2020-09-21 RX ORDER — ACETAMINOPHEN 325 MG/1
650 TABLET ORAL EVERY 4 HOURS PRN
Status: DISCONTINUED | OUTPATIENT
Start: 2020-09-21 | End: 2020-09-21 | Stop reason: HOSPADM

## 2020-09-21 RX ORDER — LIDOCAINE HYDROCHLORIDE 10 MG/ML
INJECTION, SOLUTION INFILTRATION; PERINEURAL AS NEEDED
Status: DISCONTINUED | OUTPATIENT
Start: 2020-09-21 | End: 2020-09-21 | Stop reason: HOSPADM

## 2020-09-21 RX ORDER — OXYCODONE HYDROCHLORIDE AND ACETAMINOPHEN 5; 325 MG/1; MG/1
1 TABLET ORAL EVERY 4 HOURS PRN
Status: DISCONTINUED | OUTPATIENT
Start: 2020-09-21 | End: 2020-09-21 | Stop reason: HOSPADM

## 2020-09-21 RX ORDER — IBUPROFEN 200 MG
400 TABLET ORAL EVERY 6 HOURS PRN
Status: DISCONTINUED | OUTPATIENT
Start: 2020-09-21 | End: 2020-09-21 | Stop reason: HOSPADM

## 2020-09-21 RX ORDER — FENTANYL CITRATE 50 UG/ML
INJECTION, SOLUTION INTRAMUSCULAR; INTRAVENOUS AS NEEDED
Status: DISCONTINUED | OUTPATIENT
Start: 2020-09-21 | End: 2020-09-21 | Stop reason: HOSPADM

## 2020-09-21 RX ORDER — SODIUM CHLORIDE 9 MG/ML
INJECTION, SOLUTION INTRAVENOUS CONTINUOUS PRN
Status: COMPLETED | OUTPATIENT
Start: 2020-09-21 | End: 2020-09-21

## 2020-09-21 RX ORDER — MIDAZOLAM HYDROCHLORIDE 1 MG/ML
INJECTION INTRAMUSCULAR; INTRAVENOUS AS NEEDED
Status: DISCONTINUED | OUTPATIENT
Start: 2020-09-21 | End: 2020-09-21 | Stop reason: HOSPADM

## 2020-09-21 RX ORDER — ACETAMINOPHEN 650 MG/1
650 SUPPOSITORY RECTAL EVERY 4 HOURS PRN
Status: DISCONTINUED | OUTPATIENT
Start: 2020-09-21 | End: 2020-09-21 | Stop reason: HOSPADM

## 2020-09-21 NOTE — PROCEDURES
PRE-ELECTROPHYSIOLOGY STUDY DIAGNOSES  1. Typical atrial flutter.     PROCEDURE PERFORMED  1. Electrophysiology testing with right-sided atrial flutter ablation.  2. Left atrial pacing recording from the coronary sinus.  3. Interatrial mapping.  4. Sedation    Anesthesia:    I was present with the patient for the duration of moderate sedation and supervised staff who had no other duties and monitored the patient for the entire procedure     Name of independent trained observer: Betty Bashir RN  Intra-Service start time: 1500  Intra-Service end time: 1535     Estimated Blood Loss: Less than 10 mL     Specimens: None     PROCEDURE IN DETAIL: The patient was brought into the EP lab in a fasting  state. The right and left groins were prepped and draped in the usual  sterile fashion. Access was obtained in the right femoral vein via the  Seldinger technique over which an 8 and 7-Solomon Islander sheath was placed.  Access was obtained in the left femoral vein via the Seldinger technique  over which a 5-Sol[place] Islander sheath was placed. Through the 7-Sol[place] Islander sheath, a  Halo mapping catheter was placed in the high right atrium. Through the  5-Sol[place] Islander sheath, a 5-Solomon Islander catheter was placed at the RV apex. Through  the 8-Sol[place] Islander sheath, a large curved 8 mm Blazer II ablation catheter was  placed in the coronary sinus. Pacing, recording and mapping from the left atrium was done.  The flutter circuit was mapped out, was  found to be right-sided isthmus-dependent in nature. Right-sided atrial  flutter line was then performed using 70 W of energy, 60 degree heat for  120 seconds. Two lines were placed. The flutter terminated at the  completion of the first line to normal sinus heart rhythm. Ablation  catheter was then placed back into the coronary sinus. Pacing from the  coronary sinus showed bidirectional block across the tricuspid annulus.  The His bundle was then mapped out and formal electrophysiology test was  performed.     1. Baseline  rhythm showed normal sinus heart rhythm with an R-R interval of 1080,   2. NH interval of 208,   3. QRS of 93,   4. QT of 453,   5. AH of 120,   6. HV of 37.    7. Sinus node recovery time at 600 was 1569 at 400 was 1753.   8. The AV Wenckebach cycle length was 500.   9. AV node refractory period at 600 was 430  10. The VA Wenckebach cycle length was 0.   11. VA node refractory period at 600 was 0, at 400 was 0  12. The ventricular effective refractory period at 600 was 250, at 400 was 240.     The sheaths were then pulled. Hemostasis was achieved. The patient recovered from his sedation, transferred from the lab in a stable condition.     IMPRESSION: Successful catheter mapping ablation of right-sided  isthmus-dependent atrial flutter.
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
## 2020-12-21 ENCOUNTER — APPOINTMENT (OUTPATIENT)
Dept: CT IMAGING | Facility: HOSPITAL | Age: 70
End: 2020-12-21

## 2020-12-21 ENCOUNTER — HOSPITAL ENCOUNTER (EMERGENCY)
Facility: HOSPITAL | Age: 70
Discharge: LEFT BEFORE DISCHARGE | End: 2020-12-21
Attending: FAMILY MEDICINE | Admitting: EMERGENCY MEDICINE

## 2020-12-21 ENCOUNTER — APPOINTMENT (OUTPATIENT)
Dept: GENERAL RADIOLOGY | Facility: HOSPITAL | Age: 70
End: 2020-12-21

## 2020-12-21 VITALS
WEIGHT: 154 LBS | BODY MASS INDEX: 24.17 KG/M2 | HEART RATE: 70 BPM | DIASTOLIC BLOOD PRESSURE: 62 MMHG | OXYGEN SATURATION: 99 % | RESPIRATION RATE: 16 BRPM | HEIGHT: 67 IN | SYSTOLIC BLOOD PRESSURE: 112 MMHG | TEMPERATURE: 98 F

## 2020-12-21 DIAGNOSIS — E87.20 LACTIC ACIDOSIS: ICD-10-CM

## 2020-12-21 DIAGNOSIS — F10.920 ALCOHOLIC INTOXICATION WITHOUT COMPLICATION (HCC): Primary | ICD-10-CM

## 2020-12-21 LAB
6-ACETYL MORPHINE: NEGATIVE
ALBUMIN SERPL-MCNC: 4.47 G/DL (ref 3.5–5.2)
ALBUMIN/GLOB SERPL: 1.5 G/DL
ALP SERPL-CCNC: 83 U/L (ref 39–117)
ALT SERPL W P-5'-P-CCNC: 52 U/L (ref 1–41)
AMPHET+METHAMPHET UR QL: NEGATIVE
ANION GAP SERPL CALCULATED.3IONS-SCNC: 15.8 MMOL/L (ref 5–15)
AST SERPL-CCNC: 83 U/L (ref 1–40)
BARBITURATES UR QL SCN: NEGATIVE
BENZODIAZ UR QL SCN: NEGATIVE
BILIRUB SERPL-MCNC: 2 MG/DL (ref 0–1.2)
BUN SERPL-MCNC: 9 MG/DL (ref 8–23)
BUN/CREAT SERPL: 12.9 (ref 7–25)
BUPRENORPHINE SERPL-MCNC: NEGATIVE NG/ML
CALCIUM SPEC-SCNC: 9.1 MG/DL (ref 8.6–10.5)
CANNABINOIDS SERPL QL: NEGATIVE
CHLORIDE SERPL-SCNC: 95 MMOL/L (ref 98–107)
CO2 SERPL-SCNC: 24.2 MMOL/L (ref 22–29)
COCAINE UR QL: NEGATIVE
CREAT SERPL-MCNC: 0.7 MG/DL (ref 0.76–1.27)
D-LACTATE SERPL-SCNC: 4.3 MMOL/L (ref 0.5–2)
D-LACTATE SERPL-SCNC: 5.7 MMOL/L (ref 0.5–2)
DEPRECATED RDW RBC AUTO: 49.6 FL (ref 37–54)
EOSINOPHIL # BLD MANUAL: 0.06 10*3/MM3 (ref 0–0.4)
EOSINOPHIL NFR BLD MANUAL: 1 % (ref 0.3–6.2)
ERYTHROCYTE [DISTWIDTH] IN BLOOD BY AUTOMATED COUNT: 14.4 % (ref 12.3–15.4)
ETHANOL BLD-MCNC: 338 MG/DL (ref 0–10)
ETHANOL UR QL: 0.34 %
FLUAV RNA RESP QL NAA+PROBE: NOT DETECTED
FLUBV RNA RESP QL NAA+PROBE: NOT DETECTED
GFR SERPL CREATININE-BSD FRML MDRD: 111 ML/MIN/1.73
GLOBULIN UR ELPH-MCNC: 2.9 GM/DL
GLUCOSE SERPL-MCNC: 167 MG/DL (ref 65–99)
HCT VFR BLD AUTO: 44.9 % (ref 37.5–51)
HGB BLD-MCNC: 15.4 G/DL (ref 13–17.7)
HOLD SPECIMEN: NORMAL
HOLD SPECIMEN: NORMAL
INR PPP: 0.9 (ref 0.9–1.1)
LACTATE HOLD SPECIMEN: NORMAL
LIPASE SERPL-CCNC: 33 U/L (ref 13–60)
LYMPHOCYTES # BLD MANUAL: 1.19 10*3/MM3 (ref 0.7–3.1)
LYMPHOCYTES NFR BLD MANUAL: 14 % (ref 5–12)
LYMPHOCYTES NFR BLD MANUAL: 19 % (ref 19.6–45.3)
MAGNESIUM SERPL-MCNC: 1.9 MG/DL (ref 1.6–2.4)
MCH RBC QN AUTO: 32 PG (ref 26.6–33)
MCHC RBC AUTO-ENTMCNC: 34.3 G/DL (ref 31.5–35.7)
MCV RBC AUTO: 93.2 FL (ref 79–97)
METHADONE UR QL SCN: NEGATIVE
MONOCYTES # BLD AUTO: 0.88 10*3/MM3 (ref 0.1–0.9)
NEUTROPHILS # BLD AUTO: 4.13 10*3/MM3 (ref 1.7–7)
NEUTROPHILS NFR BLD MANUAL: 66 % (ref 42.7–76)
NT-PROBNP SERPL-MCNC: 80.6 PG/ML (ref 0–900)
OPIATES UR QL: NEGATIVE
OXYCODONE UR QL SCN: NEGATIVE
PCP UR QL SCN: NEGATIVE
PLAT MORPH BLD: NORMAL
PLATELET # BLD AUTO: 115 10*3/MM3 (ref 140–450)
PMV BLD AUTO: 11.6 FL (ref 6–12)
POTASSIUM SERPL-SCNC: 4 MMOL/L (ref 3.5–5.2)
PROT SERPL-MCNC: 7.4 G/DL (ref 6–8.5)
PROTHROMBIN TIME: 11.9 SECONDS (ref 11.9–14.1)
RBC # BLD AUTO: 4.82 10*6/MM3 (ref 4.14–5.8)
RBC MORPH BLD: NORMAL
SARS-COV-2 RNA RESP QL NAA+PROBE: NOT DETECTED
SCAN SLIDE: NORMAL
SODIUM SERPL-SCNC: 135 MMOL/L (ref 136–145)
TROPONIN T SERPL-MCNC: <0.01 NG/ML (ref 0–0.03)
WBC # BLD AUTO: 6.26 10*3/MM3 (ref 3.4–10.8)
WHOLE BLOOD HOLD SPECIMEN: NORMAL
WHOLE BLOOD HOLD SPECIMEN: NORMAL

## 2020-12-21 PROCEDURE — 87040 BLOOD CULTURE FOR BACTERIA: CPT | Performed by: FAMILY MEDICINE

## 2020-12-21 PROCEDURE — 71275 CT ANGIOGRAPHY CHEST: CPT

## 2020-12-21 PROCEDURE — 85007 BL SMEAR W/DIFF WBC COUNT: CPT | Performed by: FAMILY MEDICINE

## 2020-12-21 PROCEDURE — 83735 ASSAY OF MAGNESIUM: CPT | Performed by: FAMILY MEDICINE

## 2020-12-21 PROCEDURE — 80307 DRUG TEST PRSMV CHEM ANLYZR: CPT | Performed by: FAMILY MEDICINE

## 2020-12-21 PROCEDURE — 87636 SARSCOV2 & INF A&B AMP PRB: CPT | Performed by: FAMILY MEDICINE

## 2020-12-21 PROCEDURE — 96365 THER/PROPH/DIAG IV INF INIT: CPT

## 2020-12-21 PROCEDURE — 85610 PROTHROMBIN TIME: CPT | Performed by: FAMILY MEDICINE

## 2020-12-21 PROCEDURE — 93005 ELECTROCARDIOGRAM TRACING: CPT | Performed by: FAMILY MEDICINE

## 2020-12-21 PROCEDURE — 83690 ASSAY OF LIPASE: CPT | Performed by: FAMILY MEDICINE

## 2020-12-21 PROCEDURE — 25010000002 THIAMINE PER 100 MG: Performed by: FAMILY MEDICINE

## 2020-12-21 PROCEDURE — 84484 ASSAY OF TROPONIN QUANT: CPT | Performed by: FAMILY MEDICINE

## 2020-12-21 PROCEDURE — 74177 CT ABD & PELVIS W/CONTRAST: CPT

## 2020-12-21 PROCEDURE — 93010 ELECTROCARDIOGRAM REPORT: CPT | Performed by: INTERNAL MEDICINE

## 2020-12-21 PROCEDURE — 99284 EMERGENCY DEPT VISIT MOD MDM: CPT

## 2020-12-21 PROCEDURE — 36415 COLL VENOUS BLD VENIPUNCTURE: CPT

## 2020-12-21 PROCEDURE — 83605 ASSAY OF LACTIC ACID: CPT | Performed by: FAMILY MEDICINE

## 2020-12-21 PROCEDURE — 0 IOPAMIDOL PER 1 ML: Performed by: FAMILY MEDICINE

## 2020-12-21 PROCEDURE — 96361 HYDRATE IV INFUSION ADD-ON: CPT

## 2020-12-21 PROCEDURE — 25010000002 MAGNESIUM SULFATE PER 500 MG OF MAGNESIUM: Performed by: FAMILY MEDICINE

## 2020-12-21 PROCEDURE — 71045 X-RAY EXAM CHEST 1 VIEW: CPT

## 2020-12-21 PROCEDURE — 83880 ASSAY OF NATRIURETIC PEPTIDE: CPT | Performed by: FAMILY MEDICINE

## 2020-12-21 PROCEDURE — 80053 COMPREHEN METABOLIC PANEL: CPT | Performed by: FAMILY MEDICINE

## 2020-12-21 PROCEDURE — 85025 COMPLETE CBC W/AUTO DIFF WBC: CPT | Performed by: FAMILY MEDICINE

## 2020-12-21 RX ORDER — SODIUM CHLORIDE 0.9 % (FLUSH) 0.9 %
10 SYRINGE (ML) INJECTION AS NEEDED
Status: DISCONTINUED | OUTPATIENT
Start: 2020-12-21 | End: 2020-12-21 | Stop reason: HOSPADM

## 2020-12-21 RX ADMIN — THIAMINE HYDROCHLORIDE 1000 ML/HR: 100 INJECTION, SOLUTION INTRAMUSCULAR; INTRAVENOUS at 02:39

## 2020-12-21 RX ADMIN — SODIUM CHLORIDE 2097 ML: 9 INJECTION, SOLUTION INTRAVENOUS at 05:48

## 2020-12-21 RX ADMIN — IOPAMIDOL 90 ML: 755 INJECTION, SOLUTION INTRAVENOUS at 02:51
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
## 2020-12-21 NOTE — ED NOTES
Patient wife and patient request to sign out against medical advice. Patient and wife verbalized understanding of risk and benefits and still refusing.      Annie Young RN  12/21/20 [id number]

## 2020-12-25 LAB
QT INTERVAL: 438 MS
QTC INTERVAL: 475 MS

## 2020-12-26 LAB
BACTERIA SPEC AEROBE CULT: NORMAL
BACTERIA SPEC AEROBE CULT: NORMAL

## 2021-01-22 ENCOUNTER — TELEPHONE (OUTPATIENT)
Dept: CARDIOLOGY | Facility: CLINIC | Age: 71
End: 2021-01-22

## 2021-01-22 NOTE — TELEPHONE ENCOUNTER
Patient called to reschedule an appointment, stated he was on blood thinners, and  was having some nose bleeds. He is coming in on Tuesday 01/26/21, but I encouraged him to go to the [facility] ED if nose bleeds continue before his appointment. Patient understood and said he would go.

## 2021-01-26 ENCOUNTER — OFFICE VISIT (OUTPATIENT)
Dept: CARDIOLOGY | Facility: CLINIC | Age: 71
End: 2021-01-26

## 2021-01-26 VITALS
WEIGHT: 162 LBS | BODY MASS INDEX: 25.43 KG/M2 | HEART RATE: 48 BPM | SYSTOLIC BLOOD PRESSURE: 118 MMHG | HEIGHT: 67 IN | OXYGEN SATURATION: 96 % | DIASTOLIC BLOOD PRESSURE: 64 MMHG

## 2021-01-26 DIAGNOSIS — I48.0 PAROXYSMAL ATRIAL FIBRILLATION (HCC): Primary | ICD-10-CM

## 2021-01-26 DIAGNOSIS — I50.22 CHRONIC SYSTOLIC CONGESTIVE HEART FAILURE (HCC): ICD-10-CM

## 2021-01-26 DIAGNOSIS — I42.8 NON-ISCHEMIC CARDIOMYOPATHY (HCC): ICD-10-CM

## 2021-01-26 DIAGNOSIS — I10 ESSENTIAL HYPERTENSION: ICD-10-CM

## 2021-01-26 PROCEDURE — 99214 OFFICE O/P EST MOD 30 MIN: CPT | Performed by: INTERNAL MEDICINE

## 2021-01-26 RX ORDER — METOPROLOL SUCCINATE 25 MG/1
12.5 TABLET, EXTENDED RELEASE ORAL DAILY
COMMUNITY

## 2021-02-05 ENCOUNTER — IMMUNIZATION (OUTPATIENT)
Dept: VACCINE CLINIC | Facility: HOSPITAL | Age: 71
End: 2021-02-05

## 2021-02-05 PROCEDURE — 91300 HC SARSCOV02 VAC 30MCG/0.3ML IM: CPT | Performed by: INTERNAL MEDICINE

## 2021-02-05 PROCEDURE — 0001A: CPT | Performed by: INTERNAL MEDICINE

## 2021-02-26 ENCOUNTER — IMMUNIZATION (OUTPATIENT)
Dept: VACCINE CLINIC | Facility: HOSPITAL | Age: 71
End: 2021-02-26

## 2021-02-26 PROCEDURE — 0002A: CPT | Performed by: INTERNAL MEDICINE

## 2021-02-26 PROCEDURE — 91300 HC SARSCOV02 VAC 30MCG/0.3ML IM: CPT | Performed by: INTERNAL MEDICINE

## 2021-03-26 ENCOUNTER — HOSPITAL ENCOUNTER (INPATIENT)
Facility: HOSPITAL | Age: 71
LOS: 2 days | Discharge: HOME | End: 2021-03-28
Attending: EMERGENCY MEDICINE | Admitting: INTERNAL MEDICINE

## 2021-03-26 ENCOUNTER — APPOINTMENT (OUTPATIENT)
Dept: ULTRASOUND IMAGING | Facility: HOSPITAL | Age: 71
End: 2021-03-26

## 2021-03-26 ENCOUNTER — APPOINTMENT (OUTPATIENT)
Dept: GENERAL RADIOLOGY | Facility: HOSPITAL | Age: 71
End: 2021-03-26

## 2021-03-26 ENCOUNTER — APPOINTMENT (OUTPATIENT)
Dept: CT IMAGING | Facility: HOSPITAL | Age: 71
End: 2021-03-26

## 2021-03-26 DIAGNOSIS — K85.90 ACUTE PANCREATITIS, UNSPECIFIED COMPLICATION STATUS, UNSPECIFIED PANCREATITIS TYPE: Primary | ICD-10-CM

## 2021-03-26 LAB
ALBUMIN SERPL-MCNC: 4.1 G/DL (ref 3.5–5.2)
ALBUMIN/GLOB SERPL: 1.5 G/DL
ALP SERPL-CCNC: 83 U/L (ref 39–117)
ALT SERPL W P-5'-P-CCNC: 45 U/L (ref 1–41)
AMYLASE SERPL-CCNC: 531 U/L (ref 28–100)
ANION GAP SERPL CALCULATED.3IONS-SCNC: 21.6 MMOL/L (ref 5–15)
APTT PPP: 29.9 SECONDS (ref 25.6–35.3)
AST SERPL-CCNC: 69 U/L (ref 1–40)
BACTERIA UR QL AUTO: ABNORMAL /HPF
BILIRUB CONJ SERPL-MCNC: 0.5 MG/DL (ref 0–0.3)
BILIRUB SERPL-MCNC: 1.8 MG/DL (ref 0–1.2)
BILIRUB UR QL STRIP: NEGATIVE
BUN SERPL-MCNC: 9 MG/DL (ref 8–23)
BUN/CREAT SERPL: 11.8 (ref 7–25)
CALCIUM SPEC-SCNC: 8.8 MG/DL (ref 8.6–10.5)
CHLORIDE SERPL-SCNC: 91 MMOL/L (ref 98–107)
CLARITY UR: ABNORMAL
CO2 SERPL-SCNC: 22.4 MMOL/L (ref 22–29)
COLOR UR: ABNORMAL
CREAT SERPL-MCNC: 0.76 MG/DL (ref 0.76–1.27)
CRP SERPL-MCNC: 3.14 MG/DL (ref 0–0.5)
D-LACTATE SERPL-SCNC: 3.7 MMOL/L (ref 0.5–2)
D-LACTATE SERPL-SCNC: 5.5 MMOL/L (ref 0.5–2)
DEPRECATED RDW RBC AUTO: 46.8 FL (ref 37–54)
ERYTHROCYTE [DISTWIDTH] IN BLOOD BY AUTOMATED COUNT: 14.3 % (ref 12.3–15.4)
FLUAV RNA RESP QL NAA+PROBE: NOT DETECTED
FLUBV RNA RESP QL NAA+PROBE: NOT DETECTED
GFR SERPL CREATININE-BSD FRML MDRD: 101 ML/MIN/1.73
GLOBULIN UR ELPH-MCNC: 2.7 GM/DL
GLUCOSE SERPL-MCNC: 188 MG/DL (ref 65–99)
GLUCOSE UR STRIP-MCNC: NEGATIVE MG/DL
HCT VFR BLD AUTO: 42 % (ref 37.5–51)
HGB BLD-MCNC: 14.9 G/DL (ref 13–17.7)
HGB UR QL STRIP.AUTO: ABNORMAL
HOLD SPECIMEN: NORMAL
HOLD SPECIMEN: NORMAL
HYALINE CASTS UR QL AUTO: ABNORMAL /LPF
INR PPP: 0.98 (ref 0.9–1.1)
KETONES UR QL STRIP: ABNORMAL
LARGE PLATELETS: ABNORMAL
LEUKOCYTE ESTERASE UR QL STRIP.AUTO: NEGATIVE
LIPASE SERPL-CCNC: 1325 U/L (ref 13–60)
LYMPHOCYTES # BLD MANUAL: 0.27 10*3/MM3 (ref 0.7–3.1)
LYMPHOCYTES NFR BLD MANUAL: 3 % (ref 19.6–45.3)
LYMPHOCYTES NFR BLD MANUAL: 4 % (ref 5–12)
MAGNESIUM SERPL-MCNC: 1.7 MG/DL (ref 1.6–2.4)
MCH RBC QN AUTO: 32.3 PG (ref 26.6–33)
MCHC RBC AUTO-ENTMCNC: 35.5 G/DL (ref 31.5–35.7)
MCV RBC AUTO: 90.9 FL (ref 79–97)
MONOCYTES # BLD AUTO: 0.37 10*3/MM3 (ref 0.1–0.9)
NEUTROPHILS # BLD AUTO: 8.49 10*3/MM3 (ref 1.7–7)
NEUTROPHILS NFR BLD MANUAL: 92 % (ref 42.7–76)
NEUTS BAND NFR BLD MANUAL: 1 % (ref 0–5)
NITRITE UR QL STRIP: NEGATIVE
NT-PROBNP SERPL-MCNC: 198.1 PG/ML (ref 0–900)
PH UR STRIP.AUTO: 5.5 [PH] (ref 5–8)
PLATELET # BLD AUTO: 71 10*3/MM3 (ref 140–450)
PMV BLD AUTO: 11.3 FL (ref 6–12)
POTASSIUM SERPL-SCNC: 3.3 MMOL/L (ref 3.5–5.2)
PROT SERPL-MCNC: 6.8 G/DL (ref 6–8.5)
PROT UR QL STRIP: ABNORMAL
PROTHROMBIN TIME: 12.8 SECONDS (ref 11.9–14.1)
RBC # BLD AUTO: 4.62 10*6/MM3 (ref 4.14–5.8)
RBC # UR: ABNORMAL /HPF
RBC MORPH BLD: NORMAL
REF LAB TEST METHOD: ABNORMAL
SARS-COV-2 RNA RESP QL NAA+PROBE: NOT DETECTED
SCAN SLIDE: NORMAL
SMALL PLATELETS BLD QL SMEAR: ABNORMAL
SODIUM SERPL-SCNC: 135 MMOL/L (ref 136–145)
SP GR UR STRIP: 1.02 (ref 1–1.03)
SQUAMOUS #/AREA URNS HPF: ABNORMAL /HPF
TROPONIN T SERPL-MCNC: <0.01 NG/ML (ref 0–0.03)
TROPONIN T SERPL-MCNC: <0.01 NG/ML (ref 0–0.03)
UROBILINOGEN UR QL STRIP: ABNORMAL
WBC # BLD AUTO: 9.13 10*3/MM3 (ref 3.4–10.8)
WBC UR QL AUTO: ABNORMAL /HPF
WHOLE BLOOD HOLD SPECIMEN: NORMAL
WHOLE BLOOD HOLD SPECIMEN: NORMAL

## 2021-03-26 PROCEDURE — 99285 EMERGENCY DEPT VISIT HI MDM: CPT

## 2021-03-26 PROCEDURE — 74177 CT ABD & PELVIS W/CONTRAST: CPT

## 2021-03-26 PROCEDURE — 74177 CT ABD & PELVIS W/CONTRAST: CPT | Performed by: RADIOLOGY

## 2021-03-26 PROCEDURE — 25010000002 MORPHINE PER 10 MG: Performed by: EMERGENCY MEDICINE

## 2021-03-26 PROCEDURE — 25010000002 PIPERACILLIN SOD-TAZOBACTAM PER 1 G: Performed by: PHYSICIAN ASSISTANT

## 2021-03-26 PROCEDURE — 0 IOPAMIDOL PER 1 ML: Performed by: EMERGENCY MEDICINE

## 2021-03-26 PROCEDURE — 85730 THROMBOPLASTIN TIME PARTIAL: CPT | Performed by: PHYSICIAN ASSISTANT

## 2021-03-26 PROCEDURE — 83690 ASSAY OF LIPASE: CPT | Performed by: PHYSICIAN ASSISTANT

## 2021-03-26 PROCEDURE — 82248 BILIRUBIN DIRECT: CPT | Performed by: PHYSICIAN ASSISTANT

## 2021-03-26 PROCEDURE — 71045 X-RAY EXAM CHEST 1 VIEW: CPT | Performed by: RADIOLOGY

## 2021-03-26 PROCEDURE — 80053 COMPREHEN METABOLIC PANEL: CPT | Performed by: PHYSICIAN ASSISTANT

## 2021-03-26 PROCEDURE — 83735 ASSAY OF MAGNESIUM: CPT | Performed by: PHYSICIAN ASSISTANT

## 2021-03-26 PROCEDURE — 76705 ECHO EXAM OF ABDOMEN: CPT

## 2021-03-26 PROCEDURE — 84484 ASSAY OF TROPONIN QUANT: CPT | Performed by: PHYSICIAN ASSISTANT

## 2021-03-26 PROCEDURE — 76705 ECHO EXAM OF ABDOMEN: CPT | Performed by: RADIOLOGY

## 2021-03-26 PROCEDURE — 85025 COMPLETE CBC W/AUTO DIFF WBC: CPT | Performed by: PHYSICIAN ASSISTANT

## 2021-03-26 PROCEDURE — 86140 C-REACTIVE PROTEIN: CPT | Performed by: PHYSICIAN ASSISTANT

## 2021-03-26 PROCEDURE — 83880 ASSAY OF NATRIURETIC PEPTIDE: CPT | Performed by: PHYSICIAN ASSISTANT

## 2021-03-26 PROCEDURE — 71045 X-RAY EXAM CHEST 1 VIEW: CPT

## 2021-03-26 PROCEDURE — 25010000002 ONDANSETRON PER 1 MG: Performed by: PHYSICIAN ASSISTANT

## 2021-03-26 PROCEDURE — 93010 ELECTROCARDIOGRAM REPORT: CPT | Performed by: INTERNAL MEDICINE

## 2021-03-26 PROCEDURE — 25010000003 POTASSIUM CHLORIDE 10 MEQ/100ML SOLUTION: Performed by: INTERNAL MEDICINE

## 2021-03-26 PROCEDURE — 85007 BL SMEAR W/DIFF WBC COUNT: CPT | Performed by: PHYSICIAN ASSISTANT

## 2021-03-26 PROCEDURE — 93005 ELECTROCARDIOGRAM TRACING: CPT | Performed by: EMERGENCY MEDICINE

## 2021-03-26 PROCEDURE — 83605 ASSAY OF LACTIC ACID: CPT | Performed by: PHYSICIAN ASSISTANT

## 2021-03-26 PROCEDURE — 87040 BLOOD CULTURE FOR BACTERIA: CPT | Performed by: PHYSICIAN ASSISTANT

## 2021-03-26 PROCEDURE — 85610 PROTHROMBIN TIME: CPT | Performed by: PHYSICIAN ASSISTANT

## 2021-03-26 PROCEDURE — 82150 ASSAY OF AMYLASE: CPT | Performed by: PHYSICIAN ASSISTANT

## 2021-03-26 PROCEDURE — 71275 CT ANGIOGRAPHY CHEST: CPT

## 2021-03-26 PROCEDURE — 87636 SARSCOV2 & INF A&B AMP PRB: CPT | Performed by: PHYSICIAN ASSISTANT

## 2021-03-26 PROCEDURE — 71275 CT ANGIOGRAPHY CHEST: CPT | Performed by: RADIOLOGY

## 2021-03-26 PROCEDURE — 99223 1ST HOSP IP/OBS HIGH 75: CPT | Performed by: INTERNAL MEDICINE

## 2021-03-26 PROCEDURE — 81001 URINALYSIS AUTO W/SCOPE: CPT | Performed by: PHYSICIAN ASSISTANT

## 2021-03-26 RX ORDER — ONDANSETRON 2 MG/ML
4 INJECTION INTRAMUSCULAR; INTRAVENOUS ONCE
Status: COMPLETED | OUTPATIENT
Start: 2021-03-26 | End: 2021-03-26

## 2021-03-26 RX ORDER — HYDROMORPHONE HYDROCHLORIDE 1 MG/ML
0.5 INJECTION, SOLUTION INTRAMUSCULAR; INTRAVENOUS; SUBCUTANEOUS EVERY 4 HOURS PRN
Status: DISCONTINUED | OUTPATIENT
Start: 2021-03-26 | End: 2021-03-28 | Stop reason: HOSPADM

## 2021-03-26 RX ORDER — METOPROLOL SUCCINATE 25 MG/1
12.5 TABLET, EXTENDED RELEASE ORAL DAILY
Status: DISCONTINUED | OUTPATIENT
Start: 2021-03-26 | End: 2021-03-28 | Stop reason: HOSPADM

## 2021-03-26 RX ORDER — NALOXONE HCL 0.4 MG/ML
0.4 VIAL (ML) INJECTION
Status: DISCONTINUED | OUTPATIENT
Start: 2021-03-26 | End: 2021-03-28 | Stop reason: HOSPADM

## 2021-03-26 RX ORDER — SODIUM CHLORIDE 9 MG/ML
125 INJECTION, SOLUTION INTRAVENOUS CONTINUOUS
Status: DISCONTINUED | OUTPATIENT
Start: 2021-03-26 | End: 2021-03-27

## 2021-03-26 RX ORDER — MORPHINE SULFATE 2 MG/ML
2 INJECTION, SOLUTION INTRAMUSCULAR; INTRAVENOUS ONCE
Status: COMPLETED | OUTPATIENT
Start: 2021-03-26 | End: 2021-03-26

## 2021-03-26 RX ORDER — POTASSIUM CHLORIDE 7.45 MG/ML
10 INJECTION INTRAVENOUS
Status: DISPENSED | OUTPATIENT
Start: 2021-03-26 | End: 2021-03-26

## 2021-03-26 RX ORDER — SODIUM CHLORIDE 0.9 % (FLUSH) 0.9 %
10 SYRINGE (ML) INJECTION EVERY 12 HOURS SCHEDULED
Status: DISCONTINUED | OUTPATIENT
Start: 2021-03-26 | End: 2021-03-28 | Stop reason: HOSPADM

## 2021-03-26 RX ORDER — PANTOPRAZOLE SODIUM 40 MG/10ML
40 INJECTION, POWDER, LYOPHILIZED, FOR SOLUTION INTRAVENOUS
Status: DISCONTINUED | OUTPATIENT
Start: 2021-03-27 | End: 2021-03-27

## 2021-03-26 RX ORDER — SODIUM CHLORIDE 0.9 % (FLUSH) 0.9 %
10 SYRINGE (ML) INJECTION AS NEEDED
Status: DISCONTINUED | OUTPATIENT
Start: 2021-03-26 | End: 2021-03-28 | Stop reason: HOSPADM

## 2021-03-26 RX ORDER — SODIUM CHLORIDE 9 MG/ML
75 INJECTION, SOLUTION INTRAVENOUS CONTINUOUS
Status: DISCONTINUED | OUTPATIENT
Start: 2021-03-26 | End: 2021-03-28

## 2021-03-26 RX ADMIN — POTASSIUM CHLORIDE 10 MEQ: 7.46 INJECTION, SOLUTION INTRAVENOUS at 20:36

## 2021-03-26 RX ADMIN — METOPROLOL SUCCINATE 12.5 MG: 25 TABLET, EXTENDED RELEASE ORAL at 20:36

## 2021-03-26 RX ADMIN — MORPHINE SULFATE 2 MG: 2 INJECTION, SOLUTION INTRAMUSCULAR; INTRAVENOUS at 18:49

## 2021-03-26 RX ADMIN — MORPHINE SULFATE 2 MG: 2 INJECTION, SOLUTION INTRAMUSCULAR; INTRAVENOUS at 16:31

## 2021-03-26 RX ADMIN — SODIUM CHLORIDE 125 ML/HR: 9 INJECTION, SOLUTION INTRAVENOUS at 18:49

## 2021-03-26 RX ADMIN — APIXABAN 5 MG: 5 TABLET, FILM COATED ORAL at 20:36

## 2021-03-26 RX ADMIN — SACUBITRIL AND VALSARTAN 1 TABLET: 24; 26 TABLET, FILM COATED ORAL at 20:36

## 2021-03-26 RX ADMIN — IOPAMIDOL 90 ML: 755 INJECTION, SOLUTION INTRAVENOUS at 17:59

## 2021-03-26 RX ADMIN — PIPERACILLIN SODIUM AND TAZOBACTAM SODIUM 3.38 G: 3; .375 INJECTION, POWDER, LYOPHILIZED, FOR SOLUTION INTRAVENOUS at 16:33

## 2021-03-26 RX ADMIN — ONDANSETRON 4 MG: 2 INJECTION INTRAMUSCULAR; INTRAVENOUS at 15:22

## 2021-03-26 RX ADMIN — SODIUM CHLORIDE 1000 ML: 9 INJECTION, SOLUTION INTRAVENOUS at 16:12

## 2021-03-27 LAB
ANION GAP SERPL CALCULATED.3IONS-SCNC: 13.1 MMOL/L (ref 5–15)
BUN SERPL-MCNC: 7 MG/DL (ref 8–23)
BUN/CREAT SERPL: 11.1 (ref 7–25)
CALCIUM SPEC-SCNC: 8.3 MG/DL (ref 8.6–10.5)
CHLORIDE SERPL-SCNC: 95 MMOL/L (ref 98–107)
CO2 SERPL-SCNC: 26.9 MMOL/L (ref 22–29)
CREAT SERPL-MCNC: 0.63 MG/DL (ref 0.76–1.27)
DEPRECATED RDW RBC AUTO: 51.1 FL (ref 37–54)
ERYTHROCYTE [DISTWIDTH] IN BLOOD BY AUTOMATED COUNT: 15 % (ref 12.3–15.4)
GFR SERPL CREATININE-BSD FRML MDRD: 126 ML/MIN/1.73
GLUCOSE SERPL-MCNC: 107 MG/DL (ref 65–99)
HCT VFR BLD AUTO: 41.1 % (ref 37.5–51)
HGB BLD-MCNC: 13.8 G/DL (ref 13–17.7)
MCH RBC QN AUTO: 31.7 PG (ref 26.6–33)
MCHC RBC AUTO-ENTMCNC: 33.6 G/DL (ref 31.5–35.7)
MCV RBC AUTO: 94.5 FL (ref 79–97)
PLATELET # BLD AUTO: 91 10*3/MM3 (ref 140–450)
PMV BLD AUTO: 12.3 FL (ref 6–12)
POTASSIUM SERPL-SCNC: 3.6 MMOL/L (ref 3.5–5.2)
QT INTERVAL: 394 MS
QTC INTERVAL: 439 MS
RBC # BLD AUTO: 4.35 10*6/MM3 (ref 4.14–5.8)
SODIUM SERPL-SCNC: 135 MMOL/L (ref 136–145)
WBC # BLD AUTO: 9.62 10*3/MM3 (ref 3.4–10.8)

## 2021-03-27 PROCEDURE — 80048 BASIC METABOLIC PNL TOTAL CA: CPT | Performed by: INTERNAL MEDICINE

## 2021-03-27 PROCEDURE — 25010000002 HYDROMORPHONE PER 4 MG: Performed by: INTERNAL MEDICINE

## 2021-03-27 PROCEDURE — 94799 UNLISTED PULMONARY SVC/PX: CPT

## 2021-03-27 PROCEDURE — 99232 SBSQ HOSP IP/OBS MODERATE 35: CPT | Performed by: INTERNAL MEDICINE

## 2021-03-27 PROCEDURE — 85027 COMPLETE CBC AUTOMATED: CPT | Performed by: INTERNAL MEDICINE

## 2021-03-27 RX ORDER — PANTOPRAZOLE SODIUM 40 MG/1
40 TABLET, DELAYED RELEASE ORAL
Status: DISCONTINUED | OUTPATIENT
Start: 2021-03-28 | End: 2021-03-28 | Stop reason: HOSPADM

## 2021-03-27 RX ORDER — CHLORDIAZEPOXIDE HYDROCHLORIDE 5 MG/1
10 CAPSULE, GELATIN COATED ORAL 4 TIMES DAILY PRN
Status: DISCONTINUED | OUTPATIENT
Start: 2021-03-27 | End: 2021-03-28 | Stop reason: HOSPADM

## 2021-03-27 RX ORDER — AMOXICILLIN 250 MG
2 CAPSULE ORAL 2 TIMES DAILY
Status: DISCONTINUED | OUTPATIENT
Start: 2021-03-27 | End: 2021-03-28 | Stop reason: HOSPADM

## 2021-03-27 RX ORDER — FOLIC ACID 1 MG/1
1 TABLET ORAL DAILY
Status: DISCONTINUED | OUTPATIENT
Start: 2021-03-27 | End: 2021-03-28 | Stop reason: HOSPADM

## 2021-03-27 RX ADMIN — SODIUM CHLORIDE 125 ML/HR: 9 INJECTION, SOLUTION INTRAVENOUS at 05:06

## 2021-03-27 RX ADMIN — SACUBITRIL AND VALSARTAN 1 TABLET: 24; 26 TABLET, FILM COATED ORAL at 21:09

## 2021-03-27 RX ADMIN — DOCUSATE SODIUM 50 MG AND SENNOSIDES 8.6 MG 2 TABLET: 8.6; 5 TABLET, FILM COATED ORAL at 21:09

## 2021-03-27 RX ADMIN — HYDROMORPHONE HYDROCHLORIDE 0.5 MG: 1 INJECTION, SOLUTION INTRAMUSCULAR; INTRAVENOUS; SUBCUTANEOUS at 09:01

## 2021-03-27 RX ADMIN — APIXABAN 5 MG: 5 TABLET, FILM COATED ORAL at 21:09

## 2021-03-27 RX ADMIN — SACUBITRIL AND VALSARTAN 1 TABLET: 24; 26 TABLET, FILM COATED ORAL at 08:58

## 2021-03-27 RX ADMIN — Medication 100 MG: at 15:51

## 2021-03-27 RX ADMIN — HYDROMORPHONE HYDROCHLORIDE 0.5 MG: 1 INJECTION, SOLUTION INTRAMUSCULAR; INTRAVENOUS; SUBCUTANEOUS at 00:14

## 2021-03-27 RX ADMIN — HYDROMORPHONE HYDROCHLORIDE 0.5 MG: 1 INJECTION, SOLUTION INTRAMUSCULAR; INTRAVENOUS; SUBCUTANEOUS at 21:09

## 2021-03-27 RX ADMIN — APIXABAN 5 MG: 5 TABLET, FILM COATED ORAL at 08:58

## 2021-03-27 RX ADMIN — SODIUM CHLORIDE 75 ML/HR: 9 INJECTION, SOLUTION INTRAVENOUS at 14:19

## 2021-03-27 RX ADMIN — FOLIC ACID 1 MG: 1 TABLET ORAL at 15:51

## 2021-03-27 RX ADMIN — DOCUSATE SODIUM 50 MG AND SENNOSIDES 8.6 MG 2 TABLET: 8.6; 5 TABLET, FILM COATED ORAL at 15:51

## 2021-03-27 RX ADMIN — HYDROMORPHONE HYDROCHLORIDE 0.5 MG: 1 INJECTION, SOLUTION INTRAMUSCULAR; INTRAVENOUS; SUBCUTANEOUS at 15:51

## 2021-03-27 RX ADMIN — METOPROLOL SUCCINATE 12.5 MG: 25 TABLET, EXTENDED RELEASE ORAL at 08:58

## 2021-03-27 RX ADMIN — PANTOPRAZOLE SODIUM 40 MG: 40 INJECTION, POWDER, FOR SOLUTION INTRAVENOUS at 05:06

## 2021-03-28 VITALS
SYSTOLIC BLOOD PRESSURE: 118 MMHG | WEIGHT: 166.6 LBS | TEMPERATURE: 99.1 F | HEIGHT: 67 IN | DIASTOLIC BLOOD PRESSURE: 66 MMHG | BODY MASS INDEX: 26.15 KG/M2 | HEART RATE: 68 BPM | RESPIRATION RATE: 18 BRPM | OXYGEN SATURATION: 92 %

## 2021-03-28 LAB — D-LACTATE SERPL-SCNC: 2 MMOL/L (ref 0.5–2)

## 2021-03-28 PROCEDURE — 83605 ASSAY OF LACTIC ACID: CPT | Performed by: INTERNAL MEDICINE

## 2021-03-28 PROCEDURE — 99239 HOSP IP/OBS DSCHRG MGMT >30: CPT | Performed by: INTERNAL MEDICINE

## 2021-03-28 RX ORDER — FOLIC ACID 1 MG/1
1 TABLET ORAL DAILY
Qty: 30 TABLET | Refills: 0 | Status: SHIPPED | OUTPATIENT
Start: 2021-03-29 | End: 2021-04-28

## 2021-03-28 RX ADMIN — SACUBITRIL AND VALSARTAN 1 TABLET: 24; 26 TABLET, FILM COATED ORAL at 08:00

## 2021-03-28 RX ADMIN — Medication 100 MG: at 08:00

## 2021-03-28 RX ADMIN — PANTOPRAZOLE SODIUM 40 MG: 40 TABLET, DELAYED RELEASE ORAL at 05:46

## 2021-03-28 RX ADMIN — FOLIC ACID 1 MG: 1 TABLET ORAL at 08:00

## 2021-03-28 RX ADMIN — SODIUM CHLORIDE, PRESERVATIVE FREE 10 ML: 5 INJECTION INTRAVENOUS at 08:00

## 2021-03-28 RX ADMIN — METOPROLOL SUCCINATE 12.5 MG: 25 TABLET, EXTENDED RELEASE ORAL at 08:00

## 2021-03-28 RX ADMIN — APIXABAN 5 MG: 5 TABLET, FILM COATED ORAL at 08:00

## 2021-03-28 RX ADMIN — DOCUSATE SODIUM 50 MG AND SENNOSIDES 8.6 MG 2 TABLET: 8.6; 5 TABLET, FILM COATED ORAL at 08:00

## 2021-03-29 ENCOUNTER — READMISSION MANAGEMENT (OUTPATIENT)
Dept: CALL CENTER | Facility: HOSPITAL | Age: 71
End: 2021-03-29

## 2021-03-29 ENCOUNTER — TELEPHONE (OUTPATIENT)
Dept: MEDSURG UNIT | Facility: HOSPITAL | Age: 71
End: 2021-03-29

## 2021-03-29 NOTE — OUTREACH NOTE
Prep Survey      Responses   [facility]   Is LACE score < 7 ?  No   Emergency Room discharge w/ pulse ox?  No   Eligibility  Readm Mgmt   Discharge diagnosis  pancreatitis   Does the patient have one of the following disease processes/diagnoses(primary or secondary)?  Other   Does the patient have Home health ordered?  No   Is there a DME ordered?  No   Comments regarding appointments  call for apmt   Prep survey completed?  Yes          Jaja Hui RN

## 2021-03-30 ENCOUNTER — READMISSION MANAGEMENT (OUTPATIENT)
Dept: CALL CENTER | Facility: HOSPITAL | Age: 71
End: 2021-03-30

## 2021-03-30 NOTE — OUTREACH NOTE
Medical Week 1 Survey      Responses   [facility] patient discharged from?  [facility]   Does the patient have one of the following disease processes/diagnoses(primary or secondary)?  Other   Week 1 attempt successful?  No   Unsuccessful attempts  Attempt 1          Renetta Muhammad RN

## 2021-03-31 LAB
BACTERIA SPEC AEROBE CULT: NORMAL
BACTERIA SPEC AEROBE CULT: NORMAL

## 2021-04-02 ENCOUNTER — READMISSION MANAGEMENT (OUTPATIENT)
Dept: CALL CENTER | Facility: HOSPITAL | Age: 71
End: 2021-04-02

## 2021-04-02 NOTE — OUTREACH NOTE
Medical Week 1 Survey      Responses   [facility] patient discharged from?  [facility]   Does the patient have one of the following disease processes/diagnoses(primary or secondary)?  Other   Week 1 attempt successful?  No   Unsuccessful attempts  Attempt 2          Renetta Muhammad RN

## 2021-04-05 ENCOUNTER — READMISSION MANAGEMENT (OUTPATIENT)
Dept: CALL CENTER | Facility: HOSPITAL | Age: 71
End: 2021-04-05

## 2021-04-12 ENCOUNTER — READMISSION MANAGEMENT (OUTPATIENT)
Dept: CALL CENTER | Facility: HOSPITAL | Age: 71
End: 2021-04-12

## 2021-04-12 NOTE — OUTREACH NOTE
Medical Week 2 Survey      Responses   [facility] patient discharged from?  [facility]   Does the patient have one of the following disease processes/diagnoses(primary or secondary)?  Other   Week 2 attempt successful?  Yes   Call start time  1605   Discharge diagnosis  pancreatitis   Call end time  1606   Meds reviewed with patient/caregiver?  Yes   Is the patient having any side effects they believe may be caused by any medication additions or changes?  No   Does the patient have all medications ordered at discharge?  Yes   Is the patient taking all medications as directed (includes completed medication regime)?  Yes   Does the patient have a primary care provider?   Yes   Does the patient have an appointment with their PCP within 7 days of discharge?  Yes   Has the patient kept scheduled appointments due by today?  Yes   Psychosocial issues?  No   Did the patient receive a copy of their discharge instructions?  Yes   Nursing interventions  Reviewed instructions with patient, Educated on MyChart   What is the patient's perception of their health status since discharge?  Improving   Is the patient/caregiver able to teach back signs and symptoms related to disease process for when to call PCP?  Yes   Is the patient/caregiver able to teach back signs and symptoms related to disease process for when to call 911?  Yes   Is the patient/caregiver able to teach back the hierarchy of who to call/visit for symptoms/problems? PCP, Specialist, Home health nurse, Urgent Care, ED, 911  Yes   If the patient is a current smoker, are they able to teach back resources for cessation?  Smoking cessation medications   Week 2 Call Completed?  Yes          Zayra Quarles RN

## 2021-04-19 ENCOUNTER — READMISSION MANAGEMENT (OUTPATIENT)
Dept: CALL CENTER | Facility: HOSPITAL | Age: 71
End: 2021-04-19

## 2021-04-19 NOTE — OUTREACH NOTE
Medical Week 3 Survey      Responses   [facility] patient discharged from?  [facility]   Does the patient have one of the following disease processes/diagnoses(primary or secondary)?  Other   Week 3 attempt successful?  Yes   Call start time  1628   Call end time  1632   Is patient permission given to speak with other caregiver?  Yes   Person spoke with today (if not patient) and relationship  sister-Ludmila   Week 3 Call Completed?  Yes   Wrap up additional comments  Brief call with sister of patient who states patient is doing well today. States that she just spoke with him this afternoon. Denies any needs today.          Maxine Silva, RN

## 2021-04-27 ENCOUNTER — READMISSION MANAGEMENT (OUTPATIENT)
Dept: CALL CENTER | Facility: HOSPITAL | Age: 71
End: 2021-04-27

## 2021-04-27 NOTE — OUTREACH NOTE
Medical Week 4 Survey      Responses   [facility] patient discharged from?  [facility]   Does the patient have one of the following disease processes/diagnoses(primary or secondary)?  Other   Week 4 attempt successful?  No          Zayra Quarles RN

## 2021-06-19 ENCOUNTER — APPOINTMENT (OUTPATIENT)
Dept: GENERAL RADIOLOGY | Facility: HOSPITAL | Age: 71
End: 2021-06-19

## 2021-06-19 ENCOUNTER — APPOINTMENT (OUTPATIENT)
Dept: CT IMAGING | Facility: HOSPITAL | Age: 71
End: 2021-06-19

## 2021-06-19 ENCOUNTER — APPOINTMENT (OUTPATIENT)
Dept: ULTRASOUND IMAGING | Facility: HOSPITAL | Age: 71
End: 2021-06-19

## 2021-06-19 ENCOUNTER — HOSPITAL ENCOUNTER (EMERGENCY)
Facility: HOSPITAL | Age: 71
Discharge: HOME | End: 2021-06-19
Attending: EMERGENCY MEDICINE | Admitting: EMERGENCY MEDICINE

## 2021-06-19 DIAGNOSIS — N39.0 ACUTE UTI: Primary | ICD-10-CM

## 2021-06-19 DIAGNOSIS — E83.42 HYPOMAGNESEMIA: ICD-10-CM

## 2021-06-19 DIAGNOSIS — E87.6 HYPOKALEMIA: ICD-10-CM

## 2021-06-19 LAB
ALBUMIN SERPL-MCNC: 3.15 G/DL (ref 3.5–5.2)
ALBUMIN/GLOB SERPL: 1 G/DL
ALP SERPL-CCNC: 101 U/L (ref 39–117)
ALT SERPL W P-5'-P-CCNC: 8 U/L (ref 1–41)
ANION GAP SERPL CALCULATED.3IONS-SCNC: 13 MMOL/L (ref 5–15)
ANISOCYTOSIS BLD QL: NORMAL
AST SERPL-CCNC: 14 U/L (ref 1–40)
B PARAPERT DNA SPEC QL NAA+PROBE: NOT DETECTED
B PERT DNA SPEC QL NAA+PROBE: NOT DETECTED
BACTERIA UR QL AUTO: ABNORMAL /HPF
BASOPHILS # BLD AUTO: 0.03 10*3/MM3 (ref 0–0.2)
BASOPHILS NFR BLD AUTO: 0.2 % (ref 0–1.5)
BILIRUB SERPL-MCNC: 4 MG/DL (ref 0–1.2)
BILIRUB UR QL STRIP: ABNORMAL
BUN SERPL-MCNC: 18 MG/DL (ref 8–23)
BUN/CREAT SERPL: 17.5 (ref 7–25)
C PNEUM DNA NPH QL NAA+NON-PROBE: NOT DETECTED
CALCIUM SPEC-SCNC: 7.7 MG/DL (ref 8.6–10.5)
CHLORIDE SERPL-SCNC: 89 MMOL/L (ref 98–107)
CLARITY UR: ABNORMAL
CO2 SERPL-SCNC: 29 MMOL/L (ref 22–29)
COLOR UR: ABNORMAL
CREAT SERPL-MCNC: 1.03 MG/DL (ref 0.76–1.27)
D-LACTATE SERPL-SCNC: 1.5 MMOL/L (ref 0.5–2)
DEPRECATED RDW RBC AUTO: 54.3 FL (ref 37–54)
EOSINOPHIL # BLD AUTO: 0.23 10*3/MM3 (ref 0–0.4)
EOSINOPHIL NFR BLD AUTO: 1.5 % (ref 0.3–6.2)
ERYTHROCYTE [DISTWIDTH] IN BLOOD BY AUTOMATED COUNT: 16.3 % (ref 12.3–15.4)
FLUAV RNA RESP QL NAA+PROBE: NOT DETECTED
FLUAV SUBTYP SPEC NAA+PROBE: NOT DETECTED
FLUBV RNA ISLT QL NAA+PROBE: NOT DETECTED
FLUBV RNA RESP QL NAA+PROBE: NOT DETECTED
GFR SERPL CREATININE-BSD FRML MDRD: 71 ML/MIN/1.73
GLOBULIN UR ELPH-MCNC: 3.1 GM/DL
GLUCOSE SERPL-MCNC: 138 MG/DL (ref 65–99)
GLUCOSE UR STRIP-MCNC: NEGATIVE MG/DL
HADV DNA SPEC NAA+PROBE: NOT DETECTED
HCOV 229E RNA SPEC QL NAA+PROBE: NOT DETECTED
HCOV HKU1 RNA SPEC QL NAA+PROBE: NOT DETECTED
HCOV NL63 RNA SPEC QL NAA+PROBE: NOT DETECTED
HCOV OC43 RNA SPEC QL NAA+PROBE: NOT DETECTED
HCT VFR BLD AUTO: 41.9 % (ref 37.5–51)
HGB BLD-MCNC: 14.5 G/DL (ref 13–17.7)
HGB UR QL STRIP.AUTO: NEGATIVE
HMPV RNA NPH QL NAA+NON-PROBE: NOT DETECTED
HPIV1 RNA SPEC QL NAA+PROBE: NOT DETECTED
HPIV2 RNA SPEC QL NAA+PROBE: NOT DETECTED
HPIV3 RNA NPH QL NAA+PROBE: NOT DETECTED
HPIV4 P GENE NPH QL NAA+PROBE: NOT DETECTED
HYALINE CASTS UR QL AUTO: ABNORMAL /LPF
IMM GRANULOCYTES # BLD AUTO: 0.31 10*3/MM3 (ref 0–0.05)
IMM GRANULOCYTES NFR BLD AUTO: 2 % (ref 0–0.5)
KETONES UR QL STRIP: NEGATIVE
LARGE PLATELETS: NORMAL
LEUKOCYTE ESTERASE UR QL STRIP.AUTO: ABNORMAL
LYMPHOCYTES # BLD AUTO: 0.67 10*3/MM3 (ref 0.7–3.1)
LYMPHOCYTES NFR BLD AUTO: 4.3 % (ref 19.6–45.3)
M PNEUMO IGG SER IA-ACNC: NOT DETECTED
MAGNESIUM SERPL-MCNC: 0.9 MG/DL (ref 1.6–2.4)
MCH RBC QN AUTO: 32.6 PG (ref 26.6–33)
MCHC RBC AUTO-ENTMCNC: 34.6 G/DL (ref 31.5–35.7)
MCV RBC AUTO: 94.2 FL (ref 79–97)
MONOCYTES # BLD AUTO: 1.76 10*3/MM3 (ref 0.1–0.9)
MONOCYTES NFR BLD AUTO: 11.3 % (ref 5–12)
NEUTROPHILS NFR BLD AUTO: 12.63 10*3/MM3 (ref 1.7–7)
NEUTROPHILS NFR BLD AUTO: 80.7 % (ref 42.7–76)
NITRITE UR QL STRIP: POSITIVE
NRBC BLD AUTO-RTO: 0 /100 WBC (ref 0–0.2)
PH UR STRIP.AUTO: <=5 [PH] (ref 5–8)
PLATELET # BLD AUTO: 48 10*3/MM3 (ref 140–450)
PMV BLD AUTO: 12.5 FL (ref 6–12)
POTASSIUM SERPL-SCNC: 2.8 MMOL/L (ref 3.5–5.2)
PROT SERPL-MCNC: 6.2 G/DL (ref 6–8.5)
PROT UR QL STRIP: ABNORMAL
RBC # BLD AUTO: 4.45 10*6/MM3 (ref 4.14–5.8)
RBC # UR: ABNORMAL /HPF
REF LAB TEST METHOD: ABNORMAL
RHINOVIRUS RNA SPEC NAA+PROBE: NOT DETECTED
RSV RNA NPH QL NAA+NON-PROBE: NOT DETECTED
SARS-COV-2 RNA NPH QL NAA+NON-PROBE: NOT DETECTED
SARS-COV-2 RNA RESP QL NAA+PROBE: NOT DETECTED
SMALL PLATELETS BLD QL SMEAR: NORMAL
SODIUM SERPL-SCNC: 131 MMOL/L (ref 136–145)
SP GR UR STRIP: 1.03 (ref 1–1.03)
SQUAMOUS #/AREA URNS HPF: ABNORMAL /HPF
STOMATOCYTES BLD QL SMEAR: NORMAL
TROPONIN T SERPL-MCNC: <0.01 NG/ML (ref 0–0.03)
UROBILINOGEN UR QL STRIP: ABNORMAL
WBC # BLD AUTO: 15.63 10*3/MM3 (ref 3.4–10.8)
WBC UR QL AUTO: ABNORMAL /HPF

## 2021-06-19 PROCEDURE — 81001 URINALYSIS AUTO W/SCOPE: CPT | Performed by: EMERGENCY MEDICINE

## 2021-06-19 PROCEDURE — 83605 ASSAY OF LACTIC ACID: CPT | Performed by: EMERGENCY MEDICINE

## 2021-06-19 PROCEDURE — 25010000003 POTASSIUM CHLORIDE 10 MEQ/100ML SOLUTION: Performed by: EMERGENCY MEDICINE

## 2021-06-19 PROCEDURE — 96365 THER/PROPH/DIAG IV INF INIT: CPT

## 2021-06-19 PROCEDURE — 96375 TX/PRO/DX INJ NEW DRUG ADDON: CPT

## 2021-06-19 PROCEDURE — 71045 X-RAY EXAM CHEST 1 VIEW: CPT

## 2021-06-19 PROCEDURE — 85007 BL SMEAR W/DIFF WBC COUNT: CPT | Performed by: EMERGENCY MEDICINE

## 2021-06-19 PROCEDURE — 93010 ELECTROCARDIOGRAM REPORT: CPT | Performed by: SPECIALIST

## 2021-06-19 PROCEDURE — 96368 THER/DIAG CONCURRENT INF: CPT

## 2021-06-19 PROCEDURE — C9803 HOPD COVID-19 SPEC COLLECT: HCPCS

## 2021-06-19 PROCEDURE — 74176 CT ABD & PELVIS W/O CONTRAST: CPT

## 2021-06-19 PROCEDURE — 87040 BLOOD CULTURE FOR BACTERIA: CPT | Performed by: EMERGENCY MEDICINE

## 2021-06-19 PROCEDURE — 96366 THER/PROPH/DIAG IV INF ADDON: CPT

## 2021-06-19 PROCEDURE — 83735 ASSAY OF MAGNESIUM: CPT | Performed by: EMERGENCY MEDICINE

## 2021-06-19 PROCEDURE — 25010000002 CALCIUM GLUCONATE-NACL 1-0.675 GM/50ML-% SOLUTION: Performed by: EMERGENCY MEDICINE

## 2021-06-19 PROCEDURE — 25010000002 PIPERACILLIN SOD-TAZOBACTAM PER 1 G: Performed by: EMERGENCY MEDICINE

## 2021-06-19 PROCEDURE — 84484 ASSAY OF TROPONIN QUANT: CPT | Performed by: EMERGENCY MEDICINE

## 2021-06-19 PROCEDURE — 25010000002 ONDANSETRON PER 1 MG: Performed by: EMERGENCY MEDICINE

## 2021-06-19 PROCEDURE — 80053 COMPREHEN METABOLIC PANEL: CPT | Performed by: EMERGENCY MEDICINE

## 2021-06-19 PROCEDURE — 76705 ECHO EXAM OF ABDOMEN: CPT

## 2021-06-19 PROCEDURE — 87636 SARSCOV2 & INF A&B AMP PRB: CPT | Performed by: EMERGENCY MEDICINE

## 2021-06-19 PROCEDURE — 99284 EMERGENCY DEPT VISIT MOD MDM: CPT

## 2021-06-19 PROCEDURE — 0202U NFCT DS 22 TRGT SARS-COV-2: CPT | Performed by: EMERGENCY MEDICINE

## 2021-06-19 PROCEDURE — 85025 COMPLETE CBC W/AUTO DIFF WBC: CPT | Performed by: EMERGENCY MEDICINE

## 2021-06-19 PROCEDURE — 93005 ELECTROCARDIOGRAM TRACING: CPT | Performed by: EMERGENCY MEDICINE

## 2021-06-19 PROCEDURE — 25010000002 MAGNESIUM SULFATE 2 GM/50ML SOLUTION: Performed by: EMERGENCY MEDICINE

## 2021-06-19 RX ORDER — DIPHENOXYLATE HYDROCHLORIDE AND ATROPINE SULFATE 2.5; .025 MG/1; MG/1
1 TABLET ORAL ONCE
Status: COMPLETED | OUTPATIENT
Start: 2021-06-19 | End: 2021-06-19

## 2021-06-19 RX ORDER — POTASSIUM CHLORIDE 20 MEQ/1
40 TABLET, EXTENDED RELEASE ORAL ONCE
Status: COMPLETED | OUTPATIENT
Start: 2021-06-19 | End: 2021-06-19

## 2021-06-19 RX ORDER — ACETAMINOPHEN 500 MG
1000 TABLET ORAL ONCE
Status: COMPLETED | OUTPATIENT
Start: 2021-06-19 | End: 2021-06-19

## 2021-06-19 RX ORDER — POTASSIUM CHLORIDE 750 MG/1
10 TABLET, FILM COATED, EXTENDED RELEASE ORAL ONCE
Qty: 10 TABLET | Refills: 0 | Status: SHIPPED | OUTPATIENT
Start: 2021-06-20 | End: 2021-06-20

## 2021-06-19 RX ORDER — POTASSIUM CHLORIDE 7.45 MG/ML
10 INJECTION INTRAVENOUS ONCE
Status: COMPLETED | OUTPATIENT
Start: 2021-06-19 | End: 2021-06-19

## 2021-06-19 RX ORDER — CALCIUM GLUCONATE 20 MG/ML
1 INJECTION, SOLUTION INTRAVENOUS ONCE
Status: COMPLETED | OUTPATIENT
Start: 2021-06-19 | End: 2021-06-19

## 2021-06-19 RX ORDER — METRONIDAZOLE 500 MG/1
500 TABLET ORAL 3 TIMES DAILY
Qty: 24 TABLET | Refills: 0 | Status: SHIPPED | OUTPATIENT
Start: 2021-06-19 | End: 2021-10-28

## 2021-06-19 RX ORDER — ONDANSETRON 2 MG/ML
4 INJECTION INTRAMUSCULAR; INTRAVENOUS ONCE
Status: COMPLETED | OUTPATIENT
Start: 2021-06-19 | End: 2021-06-19

## 2021-06-19 RX ORDER — MAGNESIUM SULFATE HEPTAHYDRATE 40 MG/ML
2 INJECTION, SOLUTION INTRAVENOUS ONCE
Status: COMPLETED | OUTPATIENT
Start: 2021-06-19 | End: 2021-06-19

## 2021-06-19 RX ADMIN — PIPERACILLIN SODIUM AND TAZOBACTAM SODIUM 3.38 G: 3; .375 INJECTION, POWDER, LYOPHILIZED, FOR SOLUTION INTRAVENOUS at 20:33

## 2021-06-19 RX ADMIN — POTASSIUM CHLORIDE 10 MEQ: 7.46 INJECTION, SOLUTION INTRAVENOUS at 20:09

## 2021-06-19 RX ADMIN — DIPHENOXYLATE HYDROCHLORIDE AND ATROPINE SULFATE 1 TABLET: 2.5; .025 TABLET ORAL at 19:33

## 2021-06-19 RX ADMIN — METRONIDAZOLE 500 MG: 500 INJECTION, SOLUTION INTRAVENOUS at 20:33

## 2021-06-19 RX ADMIN — ONDANSETRON 4 MG: 2 INJECTION INTRAMUSCULAR; INTRAVENOUS at 19:32

## 2021-06-19 RX ADMIN — CALCIUM GLUCONATE 1 G: 20 INJECTION, SOLUTION INTRAVENOUS at 20:09

## 2021-06-19 RX ADMIN — SODIUM CHLORIDE 1000 ML: 9 INJECTION, SOLUTION INTRAVENOUS at 19:17

## 2021-06-19 RX ADMIN — POTASSIUM CHLORIDE 40 MEQ: 20 TABLET, EXTENDED RELEASE ORAL at 20:08

## 2021-06-19 RX ADMIN — ACETAMINOPHEN 1000 MG: 500 TABLET ORAL at 19:16

## 2021-06-19 RX ADMIN — MAGNESIUM SULFATE HEPTAHYDRATE 2 G: 40 INJECTION, SOLUTION INTRAVENOUS at 20:17

## 2021-06-20 VITALS
BODY MASS INDEX: 26.06 KG/M2 | RESPIRATION RATE: 18 BRPM | OXYGEN SATURATION: 98 % | DIASTOLIC BLOOD PRESSURE: 69 MMHG | SYSTOLIC BLOOD PRESSURE: 105 MMHG | TEMPERATURE: 98.4 F | HEIGHT: 67 IN | WEIGHT: 166 LBS | HEART RATE: 88 BPM

## 2021-06-20 LAB
QT INTERVAL: 394 MS
QTC INTERVAL: 479 MS
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
## 2021-06-24 LAB
BACTERIA SPEC AEROBE CULT: NORMAL
BACTERIA SPEC AEROBE CULT: NORMAL

## 2021-07-19 ENCOUNTER — TRANSCRIBE ORDERS (OUTPATIENT)
Dept: ADMINISTRATIVE | Facility: HOSPITAL | Age: 71
End: 2021-07-19

## 2021-07-19 DIAGNOSIS — M54.50 MIDLINE LOW BACK PAIN WITHOUT SCIATICA, UNSPECIFIED CHRONICITY: Primary | ICD-10-CM

## 2021-07-20 ENCOUNTER — HOSPITAL ENCOUNTER (OUTPATIENT)
Dept: CT IMAGING | Facility: HOSPITAL | Age: 71
Discharge: HOME | End: 2021-07-20
Admitting: NURSE PRACTITIONER

## 2021-07-20 DIAGNOSIS — M54.50 MIDLINE LOW BACK PAIN WITHOUT SCIATICA, UNSPECIFIED CHRONICITY: ICD-10-CM

## 2021-07-20 PROCEDURE — 72131 CT LUMBAR SPINE W/O DYE: CPT | Performed by: RADIOLOGY

## 2021-07-20 PROCEDURE — 72131 CT LUMBAR SPINE W/O DYE: CPT

## 2021-08-24 ENCOUNTER — OFFICE VISIT (OUTPATIENT)
Dept: CARDIOLOGY | Facility: CLINIC | Age: 71
End: 2021-08-24

## 2021-08-24 DIAGNOSIS — I48.0 PAROXYSMAL ATRIAL FIBRILLATION (HCC): Primary | ICD-10-CM

## 2021-09-20 ENCOUNTER — APPOINTMENT (OUTPATIENT)
Dept: GENERAL RADIOLOGY | Facility: HOSPITAL | Age: 71
End: 2021-09-20

## 2021-09-20 ENCOUNTER — APPOINTMENT (OUTPATIENT)
Dept: CT IMAGING | Facility: HOSPITAL | Age: 71
End: 2021-09-20

## 2021-09-20 ENCOUNTER — HOSPITAL ENCOUNTER (EMERGENCY)
Facility: HOSPITAL | Age: 71
Discharge: HOME | End: 2021-09-21
Attending: EMERGENCY MEDICINE | Admitting: EMERGENCY MEDICINE

## 2021-09-20 VITALS
RESPIRATION RATE: 16 BRPM | BODY MASS INDEX: 22.73 KG/M2 | SYSTOLIC BLOOD PRESSURE: 155 MMHG | HEART RATE: 77 BPM | TEMPERATURE: 98.3 F | DIASTOLIC BLOOD PRESSURE: 83 MMHG | HEIGHT: 68 IN | WEIGHT: 150 LBS | OXYGEN SATURATION: 96 %

## 2021-09-20 DIAGNOSIS — J44.1 COPD EXACERBATION (HCC): Primary | ICD-10-CM

## 2021-09-20 DIAGNOSIS — J98.4 CAVITATING MASS IN RIGHT UPPER LUNG LOBE: ICD-10-CM

## 2021-09-20 LAB
A-A DO2: 33.4 MMHG (ref 0–300)
ALBUMIN SERPL-MCNC: 4.19 G/DL (ref 3.5–5.2)
ALBUMIN/GLOB SERPL: 1.7 G/DL
ALP SERPL-CCNC: 116 U/L (ref 39–117)
ALT SERPL W P-5'-P-CCNC: 28 U/L (ref 1–41)
ANION GAP SERPL CALCULATED.3IONS-SCNC: 17 MMOL/L (ref 5–15)
ARTERIAL PATENCY WRIST A: POSITIVE
AST SERPL-CCNC: 46 U/L (ref 1–40)
ATMOSPHERIC PRESS: 729 MMHG
BASE EXCESS BLDA CALC-SCNC: 4.9 MMOL/L (ref 0–2)
BASOPHILS # BLD AUTO: 0.04 10*3/MM3 (ref 0–0.2)
BASOPHILS NFR BLD AUTO: 0.6 % (ref 0–1.5)
BDY SITE: ABNORMAL
BILIRUB SERPL-MCNC: 1.7 MG/DL (ref 0–1.2)
BODY TEMPERATURE: 0 C
BUN SERPL-MCNC: 12 MG/DL (ref 8–23)
BUN/CREAT SERPL: 16.4 (ref 7–25)
CALCIUM SPEC-SCNC: 9.1 MG/DL (ref 8.6–10.5)
CHLORIDE SERPL-SCNC: 98 MMOL/L (ref 98–107)
CO2 BLDA-SCNC: 29.6 MMOL/L (ref 22–33)
CO2 SERPL-SCNC: 27 MMOL/L (ref 22–29)
COHGB MFR BLD: 1.7 % (ref 0–5)
CREAT SERPL-MCNC: 0.73 MG/DL (ref 0.76–1.27)
CRP SERPL-MCNC: 0.59 MG/DL (ref 0–0.5)
D-LACTATE SERPL-SCNC: 3.8 MMOL/L (ref 0.5–2)
D-LACTATE SERPL-SCNC: 3.9 MMOL/L (ref 0.5–2)
DEPRECATED RDW RBC AUTO: 51.5 FL (ref 37–54)
EOSINOPHIL # BLD AUTO: 0.03 10*3/MM3 (ref 0–0.4)
EOSINOPHIL NFR BLD AUTO: 0.4 % (ref 0.3–6.2)
ERYTHROCYTE [DISTWIDTH] IN BLOOD BY AUTOMATED COUNT: 15 % (ref 12.3–15.4)
FLUAV SUBTYP SPEC NAA+PROBE: NOT DETECTED
FLUBV RNA ISLT QL NAA+PROBE: NOT DETECTED
GFR SERPL CREATININE-BSD FRML MDRD: 106 ML/MIN/1.73
GLOBULIN UR ELPH-MCNC: 2.5 GM/DL
GLUCOSE SERPL-MCNC: 110 MG/DL (ref 65–99)
HCO3 BLDA-SCNC: 28.5 MMOL/L (ref 20–26)
HCT VFR BLD AUTO: 43.9 % (ref 37.5–51)
HCT VFR BLD CALC: 47.5 % (ref 38–51)
HGB BLD-MCNC: 14.9 G/DL (ref 13–17.7)
HGB BLDA-MCNC: 15.5 G/DL (ref 14–18)
HOLD SPECIMEN: NORMAL
HOLD SPECIMEN: NORMAL
IMM GRANULOCYTES # BLD AUTO: 0.02 10*3/MM3 (ref 0–0.05)
IMM GRANULOCYTES NFR BLD AUTO: 0.3 % (ref 0–0.5)
INHALED O2 CONCENTRATION: 21 %
LYMPHOCYTES # BLD AUTO: 1.38 10*3/MM3 (ref 0.7–3.1)
LYMPHOCYTES NFR BLD AUTO: 20.4 % (ref 19.6–45.3)
Lab: ABNORMAL
MCH RBC QN AUTO: 32.3 PG (ref 26.6–33)
MCHC RBC AUTO-ENTMCNC: 33.9 G/DL (ref 31.5–35.7)
MCV RBC AUTO: 95 FL (ref 79–97)
METHGB BLD QL: 0 % (ref 0–3)
MODALITY: ABNORMAL
MONOCYTES # BLD AUTO: 0.51 10*3/MM3 (ref 0.1–0.9)
MONOCYTES NFR BLD AUTO: 7.5 % (ref 5–12)
NEUTROPHILS NFR BLD AUTO: 4.8 10*3/MM3 (ref 1.7–7)
NEUTROPHILS NFR BLD AUTO: 70.8 % (ref 42.7–76)
NOTE: ABNORMAL
NRBC BLD AUTO-RTO: 0 /100 WBC (ref 0–0.2)
NT-PROBNP SERPL-MCNC: 93.5 PG/ML (ref 0–900)
OXYHGB MFR BLDV: 92.9 % (ref 94–99)
PCO2 BLDA: 37.7 MM HG (ref 35–45)
PCO2 TEMP ADJ BLD: ABNORMAL MM[HG]
PH BLDA: 7.49 PH UNITS (ref 7.35–7.45)
PH, TEMP CORRECTED: ABNORMAL
PLATELET # BLD AUTO: 152 10*3/MM3 (ref 140–450)
PMV BLD AUTO: 10.5 FL (ref 6–12)
PO2 BLDA: 67.2 MM HG (ref 83–108)
PO2 TEMP ADJ BLD: ABNORMAL MM[HG]
POTASSIUM SERPL-SCNC: 3.2 MMOL/L (ref 3.5–5.2)
PROT SERPL-MCNC: 6.7 G/DL (ref 6–8.5)
QT INTERVAL: 376 MS
QTC INTERVAL: 462 MS
RBC # BLD AUTO: 4.62 10*6/MM3 (ref 4.14–5.8)
SAO2 % BLDCOA: 94.5 % (ref 94–99)
SARS-COV-2 RNA PNL SPEC NAA+PROBE: NOT DETECTED
SODIUM SERPL-SCNC: 142 MMOL/L (ref 136–145)
TROPONIN T SERPL-MCNC: <0.01 NG/ML (ref 0–0.03)
TROPONIN T SERPL-MCNC: <0.01 NG/ML (ref 0–0.03)
VENTILATOR MODE: ABNORMAL
WBC # BLD AUTO: 6.78 10*3/MM3 (ref 3.4–10.8)
WHOLE BLOOD HOLD SPECIMEN: NORMAL
WHOLE BLOOD HOLD SPECIMEN: NORMAL

## 2021-09-20 PROCEDURE — 70450 CT HEAD/BRAIN W/O DYE: CPT | Performed by: RADIOLOGY

## 2021-09-20 PROCEDURE — 93010 ELECTROCARDIOGRAM REPORT: CPT | Performed by: INTERNAL MEDICINE

## 2021-09-20 PROCEDURE — 36600 WITHDRAWAL OF ARTERIAL BLOOD: CPT

## 2021-09-20 PROCEDURE — 83605 ASSAY OF LACTIC ACID: CPT | Performed by: EMERGENCY MEDICINE

## 2021-09-20 PROCEDURE — 71045 X-RAY EXAM CHEST 1 VIEW: CPT

## 2021-09-20 PROCEDURE — 83050 HGB METHEMOGLOBIN QUAN: CPT

## 2021-09-20 PROCEDURE — 86140 C-REACTIVE PROTEIN: CPT | Performed by: PHYSICIAN ASSISTANT

## 2021-09-20 PROCEDURE — 25010000002 METHYLPREDNISOLONE PER 125 MG: Performed by: EMERGENCY MEDICINE

## 2021-09-20 PROCEDURE — 84484 ASSAY OF TROPONIN QUANT: CPT | Performed by: EMERGENCY MEDICINE

## 2021-09-20 PROCEDURE — 94640 AIRWAY INHALATION TREATMENT: CPT

## 2021-09-20 PROCEDURE — 96374 THER/PROPH/DIAG INJ IV PUSH: CPT

## 2021-09-20 PROCEDURE — 99284 EMERGENCY DEPT VISIT MOD MDM: CPT

## 2021-09-20 PROCEDURE — 82805 BLOOD GASES W/O2 SATURATION: CPT

## 2021-09-20 PROCEDURE — 71045 X-RAY EXAM CHEST 1 VIEW: CPT | Performed by: RADIOLOGY

## 2021-09-20 PROCEDURE — 82375 ASSAY CARBOXYHB QUANT: CPT

## 2021-09-20 PROCEDURE — 71250 CT THORAX DX C-: CPT

## 2021-09-20 PROCEDURE — 80053 COMPREHEN METABOLIC PANEL: CPT | Performed by: EMERGENCY MEDICINE

## 2021-09-20 PROCEDURE — 83880 ASSAY OF NATRIURETIC PEPTIDE: CPT | Performed by: PHYSICIAN ASSISTANT

## 2021-09-20 PROCEDURE — 96361 HYDRATE IV INFUSION ADD-ON: CPT

## 2021-09-20 PROCEDURE — 93005 ELECTROCARDIOGRAM TRACING: CPT | Performed by: EMERGENCY MEDICINE

## 2021-09-20 PROCEDURE — 87636 SARSCOV2 & INF A&B AMP PRB: CPT | Performed by: PHYSICIAN ASSISTANT

## 2021-09-20 PROCEDURE — 70450 CT HEAD/BRAIN W/O DYE: CPT

## 2021-09-20 PROCEDURE — 94799 UNLISTED PULMONARY SVC/PX: CPT

## 2021-09-20 PROCEDURE — 74176 CT ABD & PELVIS W/O CONTRAST: CPT

## 2021-09-20 PROCEDURE — 85025 COMPLETE CBC W/AUTO DIFF WBC: CPT | Performed by: EMERGENCY MEDICINE

## 2021-09-20 PROCEDURE — 87040 BLOOD CULTURE FOR BACTERIA: CPT | Performed by: EMERGENCY MEDICINE

## 2021-09-20 RX ORDER — SODIUM CHLORIDE 0.9 % (FLUSH) 0.9 %
10 SYRINGE (ML) INJECTION AS NEEDED
Status: DISCONTINUED | OUTPATIENT
Start: 2021-09-20 | End: 2021-09-21 | Stop reason: HOSPADM

## 2021-09-20 RX ORDER — POTASSIUM CHLORIDE 20 MEQ/1
40 TABLET, EXTENDED RELEASE ORAL ONCE
Status: COMPLETED | OUTPATIENT
Start: 2021-09-20 | End: 2021-09-20

## 2021-09-20 RX ORDER — METHYLPREDNISOLONE SODIUM SUCCINATE 125 MG/2ML
125 INJECTION, POWDER, LYOPHILIZED, FOR SOLUTION INTRAMUSCULAR; INTRAVENOUS ONCE
Status: COMPLETED | OUTPATIENT
Start: 2021-09-20 | End: 2021-09-20

## 2021-09-20 RX ORDER — IPRATROPIUM BROMIDE AND ALBUTEROL SULFATE 2.5; .5 MG/3ML; MG/3ML
3 SOLUTION RESPIRATORY (INHALATION) ONCE
Status: COMPLETED | OUTPATIENT
Start: 2021-09-20 | End: 2021-09-20

## 2021-09-20 RX ADMIN — METHYLPREDNISOLONE SODIUM SUCCINATE 125 MG: 125 INJECTION, POWDER, FOR SOLUTION INTRAMUSCULAR; INTRAVENOUS at 21:22

## 2021-09-20 RX ADMIN — POTASSIUM CHLORIDE 40 MEQ: 20 TABLET, EXTENDED RELEASE ORAL at 21:23

## 2021-09-20 RX ADMIN — IPRATROPIUM BROMIDE AND ALBUTEROL SULFATE 3 ML: .5; 3 SOLUTION RESPIRATORY (INHALATION) at 21:15

## 2021-09-20 RX ADMIN — SODIUM CHLORIDE 2040 ML: 9 INJECTION, SOLUTION INTRAVENOUS at 21:22

## 2021-09-20 NOTE — ED NOTES
MEDICAL SCREENING:    Reason for Visit: pt states he has been sob x2 and dizzy    Patient initially seen in triage.  The patient was advised further evaluation and diagnostic testing will be needed, some of the treatment and testing will be initiated in the lobby in order to begin the process.  The patient will be returned to the waiting area for the time being and possibly be re-assessed by a subsequent ED provider.  The patient will be brought back to the treatment area in as timely manner as possible.         Vivi Jesus PA  09/20/21 0840

## 2021-09-21 LAB
BACTERIA UR QL AUTO: ABNORMAL /HPF
BILIRUB UR QL STRIP: ABNORMAL
CLARITY UR: CLEAR
COLOR UR: ABNORMAL
GLUCOSE UR STRIP-MCNC: NEGATIVE MG/DL
HGB UR QL STRIP.AUTO: NEGATIVE
HYALINE CASTS UR QL AUTO: ABNORMAL /LPF
KETONES UR QL STRIP: ABNORMAL
LEUKOCYTE ESTERASE UR QL STRIP.AUTO: NEGATIVE
NITRITE UR QL STRIP: NEGATIVE
PH UR STRIP.AUTO: 5.5 [PH] (ref 5–8)
PROT UR QL STRIP: ABNORMAL
RBC # UR: ABNORMAL /HPF
REF LAB TEST METHOD: ABNORMAL
SP GR UR STRIP: 1.03 (ref 1–1.03)
SQUAMOUS #/AREA URNS HPF: ABNORMAL /HPF
UROBILINOGEN UR QL STRIP: ABNORMAL
WBC UR QL AUTO: ABNORMAL /HPF

## 2021-09-21 PROCEDURE — 81001 URINALYSIS AUTO W/SCOPE: CPT | Performed by: EMERGENCY MEDICINE

## 2021-09-21 RX ORDER — CEFDINIR 300 MG/1
300 CAPSULE ORAL DAILY
Qty: 10 CAPSULE | Refills: 0 | Status: SHIPPED | OUTPATIENT
Start: 2021-09-21 | End: 2021-10-01

## 2021-09-21 RX ORDER — PREDNISONE 20 MG/1
20 TABLET ORAL
Qty: 15 TABLET | Refills: 0 | Status: SHIPPED | OUTPATIENT
Start: 2021-09-21 | End: 2021-10-28

## 2021-09-21 RX ORDER — DOXYCYCLINE 100 MG/1
100 CAPSULE ORAL 2 TIMES DAILY
Qty: 20 CAPSULE | Refills: 0 | Status: SHIPPED | OUTPATIENT
Start: 2021-09-21 | End: 2021-10-28

## 2021-09-21 RX ORDER — IPRATROPIUM BROMIDE AND ALBUTEROL SULFATE 2.5; .5 MG/3ML; MG/3ML
3 SOLUTION RESPIRATORY (INHALATION) ONCE
Status: DISCONTINUED | OUTPATIENT
Start: 2021-09-21 | End: 2021-09-21
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
## 2021-09-25 LAB
BACTERIA SPEC AEROBE CULT: NORMAL
BACTERIA SPEC AEROBE CULT: NORMAL

## 2021-10-19 ENCOUNTER — TRANSCRIBE ORDERS (OUTPATIENT)
Dept: ADMINISTRATIVE | Facility: HOSPITAL | Age: 71
End: 2021-10-19

## 2021-10-19 DIAGNOSIS — G44.309 POST-TRAUMATIC HEADACHE, NOT INTRACTABLE, UNSPECIFIED CHRONICITY PATTERN: Primary | ICD-10-CM

## 2021-10-26 ENCOUNTER — OFFICE VISIT (OUTPATIENT)
Dept: PULMONOLOGY | Facility: CLINIC | Age: 71
End: 2021-10-26

## 2021-10-26 VITALS
BODY MASS INDEX: 22.76 KG/M2 | DIASTOLIC BLOOD PRESSURE: 86 MMHG | WEIGHT: 145 LBS | TEMPERATURE: 97.1 F | HEART RATE: 83 BPM | HEIGHT: 67 IN | SYSTOLIC BLOOD PRESSURE: 116 MMHG | OXYGEN SATURATION: 98 %

## 2021-10-26 DIAGNOSIS — F17.210 CIGARETTE NICOTINE DEPENDENCE WITHOUT COMPLICATION: ICD-10-CM

## 2021-10-26 DIAGNOSIS — R06.02 SHORTNESS OF BREATH: ICD-10-CM

## 2021-10-26 DIAGNOSIS — J98.4 CAVITARY LESION OF LUNG: Primary | ICD-10-CM

## 2021-10-26 PROCEDURE — 99213 OFFICE O/P EST LOW 20 MIN: CPT | Performed by: NURSE PRACTITIONER

## 2021-10-26 RX ORDER — DOXYCYCLINE HYCLATE 50 MG/1
1 CAPSULE, GELATIN COATED ORAL
COMMUNITY
Start: 2021-10-04

## 2021-10-26 NOTE — PROGRESS NOTES
"Chief Complaint  Cavity Lesion    Subjective          Yared Mckeon presents to [facility] PULMONARY & CRITICAL CARE MEDICINE  History of Present Illness     Mr. Mckeon is a 71 year old male with a medical history atrial fibrillation, CHF, and tobacco abuse.    He presents today for evaluation of cavitary lesion.  He states that his spot on his lung was found on  CT chest that was done in the ED.  He had present to the ED for evaluation of shortness of breath. He states that he does have some shortness of breath that is worse with exertion. He also complains of productive cough. He states that the sputum is clear to light yellow in color. He denies any weight loss or night sweats. He currently uses no inhalers and no oxygen. He is a current smoker of about 2 packs/day since he was 24 years old.      Objective   Vital Signs:   /86   Pulse 83   Temp 97.1 °F (36.2 °C) (Temporal)   Ht 170.2 cm (67\")   Wt 65.8 kg (145 lb)   SpO2 98%   BMI 22.71 kg/m²     Physical Exam     GENERAL APPEARANCE: Well developed, well nourished, alert and cooperative, and appears to be in no acute distress.    HEAD: normocephalic. Atraumatic.    EYES: PERRL, EOMI. Vision is grossly intact.    THROAT: Oral cavity and pharynx normal. No inflammation, swelling, exudate, or lesions.     NECK: Neck supple.  No thyromegaly.    CARDIAC: Normal S1 and S2. No S3, S4 or murmurs. Rhythm is regular.     RESPIRATORY:Bilateral air entry positive. Bilateral diminished breath sounds. No wheezing, crackles or rhonchi noted.    GI: Positive bowel sounds. Soft, nondistended, nontender.     MUSCULOSKELETAL: No significant deformity or joint abnormality. No edema. Peripheral pulses intact. No varicosities.    NEUROLOGICAL: Strength and sensation symmetric and intact throughout.     PSYCHIATRIC: The mental examination revealed the patient was oriented to person, place, and time.     Result Review :   The following data was reviewed by: " Laurence Quinteros Marcello, APRN on 10/26/2021:  Common labs    Common Labsle 3/27/21 3/27/21 6/19/21 6/19/21 9/20/21 9/20/21    0244 0244 1920 1920 1311 1311   Glucose  107 (A) 138 (A)   110 (A)   BUN  7 (A) 18   12   Creatinine  0.63 (A) 1.03   0.73 (A)   eGFR Non African Am  126 71   106   Sodium  135 (A) 131 (A)   142   Potassium  3.6 2.8 (A)   3.2 (A)   Chloride  95 (A) 89 (A)   98   Calcium  8.3 (A) 7.7 (A)   9.1   Albumin   3.15 (A)   4.19   Total Bilirubin   4.0 (A)   1.7 (A)   Alkaline Phosphatase   101   116   AST (SGOT)   14   46 (A)   ALT (SGPT)   8   28   WBC 9.62   15.63 (A) 6.78    Hemoglobin 13.8   14.5 14.9    Hematocrit 41.1   41.9 43.9    Platelets 91 (A)   48 (A) 152    (A) Abnormal value            Data reviewed: CT chest          Assessment and Plan    Diagnoses and all orders for this visit:    1. Cavitary lesion of lung (Primary)  -     CT Chest Without Contrast; Future    2. Shortness of breath  -     Full Pulmonary Function Test With Bronchodilator; Future    3. Cigarette nicotine dependence without complication           Cavitary lesion:  CT chest was reviewed and discussed with patient in detail. I will forward CT chest to Dr. Duran for further review. As of now we will plan to repeat CT chest in 3 months to evaluate this area further, and list Dr. Duran has other recommendations.      Shortness of breath:  -We will order PFT to assess lung function.    Patient's Body mass index is 22.71 kg/m². indicating that he is within normal range (BMI 18.5-24.9). No BMI management plan needed..      Current Smoker:  -currently smoking about 2 ppd.    Yared Mckeon  reports that he has been smoking cigarettes. He has been smoking about 2.00 packs per day. He has never used smokeless tobacco.. I have educated him on the risk of diseases from using tobacco products such as cancer, COPD and heart disease.     I advised him to quit and he is not willing to quit.        Follow Up   Return in about 3 months  (around 1/26/2022).  Patient was given instructions and counseling regarding his condition or for health maintenance advice. Please see specific information pulled into the AVS if appropriate.

## 2021-10-28 DIAGNOSIS — Z01.818 OTHER SPECIFIED PRE-OPERATIVE EXAMINATION: Primary | ICD-10-CM

## 2021-10-29 ENCOUNTER — PATIENT ROUNDING (BHMG ONLY) (OUTPATIENT)
Dept: PULMONOLOGY | Facility: CLINIC | Age: 71
End: 2021-10-29

## 2021-10-29 NOTE — PROGRESS NOTES
October 29, 2021    Hello, may I speak with Yared Mckeon?    My name is Danita Marin      I am  with [facility] PULMONARY & CRITICAL CARE MEDICINE  [address]  [facility] [address] [phone].    Before we get started may I verify your date of birth? 1950    I am calling to officially welcome you to our practice and ask about your recent visit. Is this a good time to talk? yes    Tell me about your visit with us. What things went well?  Visit went well, no complaints.       We're always looking for ways to make our patients' experiences even better. Do you have recommendations on ways we may improve?  no    Overall were you satisfied with your first visit to our practice? yes       I appreciate you taking the time to speak with me today. Is there anything else I can do for you? no      Thank you, and have a great day.

## 2021-11-01 DIAGNOSIS — J98.4 CAVITARY LESION OF LUNG: Primary | ICD-10-CM

## 2021-11-02 ENCOUNTER — TELEPHONE (OUTPATIENT)
Dept: PULMONOLOGY | Facility: CLINIC | Age: 71
End: 2021-11-02

## 2021-11-02 NOTE — TELEPHONE ENCOUNTER
----- Message from BILLY Fu sent at 11/1/2021  1:10 PM EDT -----  I have tried to call him a few times.  I ordered lab work for him, as well as sputum cultures.  I also put in a new order to have his CT chest done earlier than december.  Will you call and let him know this and that he needs to come and have his labs done.  If he needs to talk to me I can call him back tomorrow.   ----- Message -----  From: Raffi Duran MD  Sent: 10/29/2021   6:10 AM EDT  To: BILLY Fu    Very significant.  Any symptoms ?    Cough, fever , weight loss ?  Smoker ?    He will need a bronch for sure. When is dr saenz coming ?  See if he can get 3 sputums afbs to lab.    Repeat ct chest in 1 month.  If still there and not any better.  I will do the bronch when I am there.    Ty  ss  ----- Message -----  From: Laurence Armendariz APRN  Sent: 10/28/2021   9:06 AM CDT  To: Raffi Duran MD    Will you review his CT Chest. He was my for a cavitary lesion.  I ordered a repeat in 3 months but wasn't sure if something else was appropriate.

## 2021-11-04 ENCOUNTER — IMMUNIZATION (OUTPATIENT)
Dept: VACCINE CLINIC | Facility: HOSPITAL | Age: 71
End: 2021-11-04

## 2021-11-04 ENCOUNTER — HOSPITAL ENCOUNTER (OUTPATIENT)
Dept: CT IMAGING | Facility: HOSPITAL | Age: 71
Discharge: HOME | End: 2021-11-04

## 2021-11-04 ENCOUNTER — LAB (OUTPATIENT)
Dept: LAB | Facility: HOSPITAL | Age: 71
End: 2021-11-04

## 2021-11-04 DIAGNOSIS — J98.4 CAVITARY LESION OF LUNG: ICD-10-CM

## 2021-11-04 DIAGNOSIS — G44.309 POST-TRAUMATIC HEADACHE, NOT INTRACTABLE, UNSPECIFIED CHRONICITY PATTERN: ICD-10-CM

## 2021-11-04 PROCEDURE — 86635 COCCIDIOIDES ANTIBODY: CPT

## 2021-11-04 PROCEDURE — 70450 CT HEAD/BRAIN W/O DYE: CPT

## 2021-11-04 PROCEDURE — 86480 TB TEST CELL IMMUN MEASURE: CPT

## 2021-11-04 PROCEDURE — 86606 ASPERGILLUS ANTIBODY: CPT

## 2021-11-04 PROCEDURE — 36415 COLL VENOUS BLD VENIPUNCTURE: CPT

## 2021-11-04 PROCEDURE — 83520 IMMUNOASSAY QUANT NOS NONAB: CPT

## 2021-11-04 PROCEDURE — 70450 CT HEAD/BRAIN W/O DYE: CPT | Performed by: RADIOLOGY

## 2021-11-04 PROCEDURE — 91300 HC SARSCOV02 VAC 30MCG/0.3ML IM: CPT | Performed by: INTERNAL MEDICINE

## 2021-11-04 PROCEDURE — 86038 ANTINUCLEAR ANTIBODIES: CPT

## 2021-11-04 PROCEDURE — 87385 HISTOPLASMA CAPSUL AG IA: CPT

## 2021-11-04 PROCEDURE — 86612 BLASTOMYCES ANTIBODY: CPT

## 2021-11-04 PROCEDURE — 86256 FLUORESCENT ANTIBODY TITER: CPT

## 2021-11-04 PROCEDURE — 0004A ADM SARSCOV2 30MCG/0.3ML BOOSTER: CPT | Performed by: INTERNAL MEDICINE

## 2021-11-04 PROCEDURE — 87449 NOS EACH ORGANISM AG IA: CPT

## 2021-11-05 ENCOUNTER — TELEPHONE (OUTPATIENT)
Dept: PULMONOLOGY | Facility: CLINIC | Age: 71
End: 2021-11-05

## 2021-11-05 LAB — ANA SER QL: NEGATIVE

## 2021-11-05 NOTE — TELEPHONE ENCOUNTER
ALICE App NO LONGER PERFORMS THE IGG BY [facility] LAB ORDER BY DARRIN HOUSER. ADVISED IT WAS OK TO RUN THE REPLACEMENT LAB IGM.

## 2021-11-07 LAB
A FLAVUS AB SER QL ID: NEGATIVE
A FUMIGATUS AB SER QL ID: NEGATIVE
A NIGER AB SER QL ID: NEGATIVE
B DERMAT AB TITR SER: NEGATIVE {TITER}
C-ANCA TITR SER IF: NORMAL TITER
H CAPSUL AG SER QL IA: <0.5
Lab: NORMAL
MYELOPEROXIDASE AB SER IA-ACNC: <9 U/ML (ref 0–9)
P-ANCA ATYPICAL TITR SER IF: NORMAL TITER
P-ANCA TITR SER IF: NORMAL TITER
PROTEINASE3 AB SER IA-ACNC: <3.5 U/ML (ref 0–3.5)

## 2021-11-08 ENCOUNTER — LAB (OUTPATIENT)
Dept: LAB | Facility: HOSPITAL | Age: 71
End: 2021-11-08

## 2021-11-08 DIAGNOSIS — J98.4 CAVITARY LESION OF LUNG: ICD-10-CM

## 2021-11-08 LAB
MVISTA(R) BLASTOMYCES AG: NORMAL NG/ML
SPECIMEN SOURCE: NORMAL

## 2021-11-08 PROCEDURE — 87206 SMEAR FLUORESCENT/ACID STAI: CPT

## 2021-11-08 PROCEDURE — 87070 CULTURE OTHR SPECIMN AEROBIC: CPT

## 2021-11-08 PROCEDURE — 87116 MYCOBACTERIA CULTURE: CPT

## 2021-11-08 PROCEDURE — 87205 SMEAR GRAM STAIN: CPT

## 2021-11-09 LAB
C IMMITIS IGG SER QL IA: 0.6 EIA UNITS
C IMMITIS IGM SER QL IA: 0.4 EIA UNITS

## 2021-11-10 LAB
BACTERIA SPEC RESP CULT: NORMAL
GRAM STN SPEC: NORMAL

## 2021-11-12 LAB
GAMMA INTERFERON BACKGROUND BLD IA-ACNC: 0.03 IU/ML
M TB IFN-G BLD-IMP: NEGATIVE
M TB IFN-G CD4+ BCKGRND COR BLD-ACNC: 0.02 IU/ML
M TB IFN-G CD4+CD8+ BCKGRND COR BLD-ACNC: 0.04 IU/ML
MITOGEN IGNF BLD-ACNC: >10 IU/ML
QUANTIFERON INCUBATION: NORMAL
SERVICE CMNT-IMP: NORMAL

## 2021-11-17 ENCOUNTER — LAB (OUTPATIENT)
Dept: LAB | Facility: HOSPITAL | Age: 71
End: 2021-11-17

## 2021-11-17 ENCOUNTER — HOSPITAL ENCOUNTER (OUTPATIENT)
Dept: CT IMAGING | Facility: HOSPITAL | Age: 71
Discharge: HOME | End: 2021-11-17

## 2021-11-17 ENCOUNTER — HOSPITAL ENCOUNTER (OUTPATIENT)
Dept: RESPIRATORY THERAPY | Facility: HOSPITAL | Age: 71
Discharge: HOME | End: 2021-11-17

## 2021-11-17 DIAGNOSIS — R06.02 SHORTNESS OF BREATH: ICD-10-CM

## 2021-11-17 DIAGNOSIS — J98.4 CAVITARY LESION OF LUNG: ICD-10-CM

## 2021-11-17 DIAGNOSIS — Z01.818 OTHER SPECIFIED PRE-OPERATIVE EXAMINATION: ICD-10-CM

## 2021-11-17 LAB
FLUAV SUBTYP SPEC NAA+PROBE: NOT DETECTED
FLUBV RNA ISLT QL NAA+PROBE: NOT DETECTED
SARS-COV-2 RNA PNL SPEC NAA+PROBE: NOT DETECTED

## 2021-11-17 PROCEDURE — 94726 PLETHYSMOGRAPHY LUNG VOLUMES: CPT | Performed by: INTERNAL MEDICINE

## 2021-11-17 PROCEDURE — 94060 EVALUATION OF WHEEZING: CPT | Performed by: INTERNAL MEDICINE

## 2021-11-17 PROCEDURE — 87636 SARSCOV2 & INF A&B AMP PRB: CPT | Performed by: NURSE PRACTITIONER

## 2021-11-17 PROCEDURE — 94060 EVALUATION OF WHEEZING: CPT

## 2021-11-17 PROCEDURE — 94729 DIFFUSING CAPACITY: CPT | Performed by: INTERNAL MEDICINE

## 2021-11-17 PROCEDURE — 71250 CT THORAX DX C-: CPT

## 2021-11-17 PROCEDURE — C9803 HOPD COVID-19 SPEC COLLECT: HCPCS

## 2021-11-17 PROCEDURE — 71250 CT THORAX DX C-: CPT | Performed by: RADIOLOGY

## 2021-11-17 PROCEDURE — 94726 PLETHYSMOGRAPHY LUNG VOLUMES: CPT

## 2021-11-17 PROCEDURE — 94640 AIRWAY INHALATION TREATMENT: CPT

## 2021-11-17 PROCEDURE — 94729 DIFFUSING CAPACITY: CPT

## 2021-11-17 RX ORDER — ALBUTEROL SULFATE 2.5 MG/3ML
2.5 SOLUTION RESPIRATORY (INHALATION) ONCE
Status: COMPLETED | OUTPATIENT
Start: 2021-11-17 | End: 2021-11-17

## 2021-11-17 RX ADMIN — ALBUTEROL SULFATE 2.5 MG: 2.5 SOLUTION RESPIRATORY (INHALATION) at 10:28

## 2021-11-24 DIAGNOSIS — J98.4 CAVITARY LESION OF LUNG: Primary | ICD-10-CM

## 2021-11-24 DIAGNOSIS — R91.1 PULMONARY NODULE: ICD-10-CM

## 2021-11-24 RX ORDER — BUDESONIDE AND FORMOTEROL FUMARATE DIHYDRATE 160; 4.5 UG/1; UG/1
2 AEROSOL RESPIRATORY (INHALATION) 2 TIMES DAILY
Qty: 10.2 G | Refills: 5 | Status: SHIPPED | OUTPATIENT
Start: 2021-11-24 | End: 2022-02-23

## 2021-11-24 NOTE — PROGRESS NOTES
Notified the patient of CT Chest results, PFT and lab work.  Dr. Osuna reviewed CT Chest and recommended a PET scan, if cavitary lesion is metabolically active we will schedule him for EBUS.  We will start him on Symbicort BID.

## 2021-12-10 ENCOUNTER — HOSPITAL ENCOUNTER (OUTPATIENT)
Dept: PET IMAGING | Facility: HOSPITAL | Age: 71
Discharge: HOME | End: 2021-12-10
Admitting: NURSE PRACTITIONER

## 2021-12-10 DIAGNOSIS — R91.1 PULMONARY NODULE: ICD-10-CM

## 2021-12-10 PROCEDURE — 0 FLUDEOXYGLUCOSE F18 SOLUTION: Performed by: NURSE PRACTITIONER

## 2021-12-10 PROCEDURE — 78815 PET IMAGE W/CT SKULL-THIGH: CPT | Performed by: RADIOLOGY

## 2021-12-10 PROCEDURE — 78815 PET IMAGE W/CT SKULL-THIGH: CPT

## 2021-12-10 PROCEDURE — A9552 F18 FDG: HCPCS | Performed by: NURSE PRACTITIONER

## 2021-12-10 RX ADMIN — FLUDEOXYGLUCOSE F18 1 DOSE: 300 INJECTION INTRAVENOUS at 12:13

## 2021-12-13 ENCOUNTER — TELEPHONE (OUTPATIENT)
Dept: PULMONOLOGY | Facility: CLINIC | Age: 71
End: 2021-12-13

## 2021-12-13 DIAGNOSIS — J98.4 CAVITARY LESION OF LUNG: Primary | ICD-10-CM

## 2021-12-13 NOTE — TELEPHONE ENCOUNTER
----- Message from BILLY Fu sent at 12/13/2021 10:26 AM EST -----  Can you let him know that his PET scan was normal.  We are going to follow the spot on his lung closely and do another CT chest in 3 months.  ----- Message -----  From: Interface, Rad Results [place]  Sent: 12/10/2021   3:11 PM EST  To: BILLY Fu

## 2021-12-24 LAB
ACID FAST STN SPEC: NEGATIVE
MYCOBACTERIUM SPEC QL CULT: NEGATIVE
SPECIMEN PREPARATION: NORMAL

## 2022-02-23 ENCOUNTER — OFFICE VISIT (OUTPATIENT)
Dept: PULMONOLOGY | Facility: CLINIC | Age: 72
End: 2022-02-23

## 2022-02-23 VITALS
BODY MASS INDEX: 26.68 KG/M2 | TEMPERATURE: 97.3 F | HEART RATE: 65 BPM | SYSTOLIC BLOOD PRESSURE: 117 MMHG | WEIGHT: 170 LBS | OXYGEN SATURATION: 98 % | DIASTOLIC BLOOD PRESSURE: 80 MMHG | HEIGHT: 67 IN

## 2022-02-23 DIAGNOSIS — J44.9 CHRONIC OBSTRUCTIVE PULMONARY DISEASE, UNSPECIFIED COPD TYPE: Primary | ICD-10-CM

## 2022-02-23 DIAGNOSIS — J98.4 CAVITARY LESION OF LUNG: ICD-10-CM

## 2022-02-23 DIAGNOSIS — F17.210 CIGARETTE NICOTINE DEPENDENCE WITHOUT COMPLICATION: ICD-10-CM

## 2022-02-23 PROCEDURE — 99214 OFFICE O/P EST MOD 30 MIN: CPT | Performed by: NURSE PRACTITIONER

## 2022-02-23 PROCEDURE — 94664 DEMO&/EVAL PT USE INHALER: CPT | Performed by: NURSE PRACTITIONER

## 2022-02-23 RX ORDER — BUDESONIDE, GLYCOPYRROLATE, AND FORMOTEROL FUMARATE 160; 9; 4.8 UG/1; UG/1; UG/1
2 AEROSOL, METERED RESPIRATORY (INHALATION) 2 TIMES DAILY
Qty: 10.7 G | Refills: 5 | Status: SHIPPED | OUTPATIENT
Start: 2022-02-23

## 2022-02-23 RX ORDER — ALBUTEROL SULFATE 90 UG/1
2 AEROSOL, METERED RESPIRATORY (INHALATION) EVERY 4 HOURS PRN
Qty: 18 G | Refills: 11 | Status: SHIPPED | OUTPATIENT
Start: 2022-02-23

## 2022-02-23 NOTE — PROGRESS NOTES
"Chief Complaint  Cavitary lesion of lung    Subjective          Yared Mckeon presents to [facility] PULMONARY & CRITICAL CARE MEDICINE  History of Present Illness     Mr. Mckeon is a 71-year-old male with a medical history significant for atrial fibrillation, chronic systolic congestive heart failure, COPD and cavitary lung lesion overlying.    He presents today for routine follow-up on cavitary lesion of the lung and COPD.  He states that overall he has been doing well.  He does complain of congestion in the morning time and states that he has a productive cough that is worse in the mornings.  He reports that shortness of breath is worse with exertion but for the most part has been at baseline.  He states that he does have Symbicort inhaler at home but does not use it.  He continues to smoke about 2 packs/day.      Objective   Vital Signs:   /80   Pulse 65   Temp 97.3 °F (36.3 °C) (Temporal)   Ht 170.2 cm (67\")   Wt 77.1 kg (170 lb)   SpO2 98%   BMI 26.63 kg/m²     Physical Exam     GENERAL APPEARANCE: Well developed, well nourished, alert and cooperative, and appears to be in no acute distress.    HEAD: normocephalic. Atraumatic.    EYES: PERRL, EOMI. Vision is grossly intact.    THROAT: Oral cavity and pharynx normal. No inflammation, swelling, exudate, or lesions.     NECK: Neck supple.  No thyromegaly.    CARDIAC: Normal S1 and S2. No S3, S4 or murmurs. Rhythm is regular.     RESPIRATORY:Bilateral air entry positive. Bilateral diminished breath sounds. No wheezing, crackles or rhonchi noted.    GI: Positive bowel sounds. Soft, nondistended, nontender.     MUSCULOSKELETAL: No significant deformity or joint abnormality. No edema. Peripheral pulses intact. No varicosities.    NEUROLOGICAL: Strength and sensation symmetric and intact throughout.     PSYCHIATRIC: The mental examination revealed the patient was oriented to person, place, and time.     Result Review :   The following data " was reviewed by: BILLY Fu on 02/23/2022:  Common labs    Common Labsle 3/27/21 3/27/21 6/19/21 6/19/21 9/20/21 9/20/21    0244 0244 1920 1920 1311 1311   Glucose  107 (A) 138 (A)   110 (A)   BUN  7 (A) 18   12   Creatinine  0.63 (A) 1.03   0.73 (A)   eGFR Non African Am  126 71   106   Sodium  135 (A) 131 (A)   142   Potassium  3.6 2.8 (A)   3.2 (A)   Chloride  95 (A) 89 (A)   98   Calcium  8.3 (A) 7.7 (A)   9.1   Albumin   3.15 (A)   4.19   Total Bilirubin   4.0 (A)   1.7 (A)   Alkaline Phosphatase   101   116   AST (SGOT)   14   46 (A)   ALT (SGPT)   8   28   WBC 9.62   15.63 (A) 6.78    Hemoglobin 13.8   14.5 14.9    Hematocrit 41.1   41.9 43.9    Platelets 91 (A)   48 (A) 152    (A) Abnormal value            Data reviewed: PFT and PET scan          Assessment and Plan    Diagnoses and all orders for this visit:    1. Chronic obstructive pulmonary disease, unspecified COPD type (HCC) (Primary)    2. Cavitary lesion of lung    3. Cigarette nicotine dependence without complication    Other orders  -     Budeson-Glycopyrrol-Formoterol (Breztri Aerosphere) 160-9-4.8 MCG/ACT aerosol inhaler; Inhale 2 puffs 2 (Two) Times a Day.  Dispense: 10.7 g; Refill: 5  -     albuterol sulfate  (90 Base) MCG/ACT inhaler; Inhale 2 puffs Every 4 (Four) Hours As Needed for Wheezing.  Dispense: 18 g; Refill: 11       CT chest in march.      Cavitary lesion:  PET scan was reviewed.  No hypermetabolic activity was noted in cavitary lesion.  PET scan reviewed by Dr. Osuna.  He recommended a repeat CT chest in March 2022.  Order has already been placed.      COPD, unspecified type:  -PFT showed moderate obstruction.  -We will start him on Breztri, 2 puffs twice daily.  A spacer was provided.  Inhaler education was also provided.       - Inhaler technique demonstration/discussion:  I have extensively discussed the steps.  In summary, the steps were discussed in the following order.Patient was advised to rinse  the mouth after steroid inhalation to prevent fungal mucositis.  · Remove the cap from the inhaler and shake well.    · Breathe out all the way.    · Place the mouthpiece of the inhaler between your teeth and seal your lips tightly around it.    · As you start to breathe in slowly, press down on the canister one time.   · Keep breathing in as slowly and deeply as you can.    · It should take about 5 seconds for you to completely breathe in.    · Hold your breath for 10 seconds(count to 10 slowly) to allow the medication to reach the airways of the lung.    · Repeat the above steps for each puff.    · Wait about 1 minute between the puffs.    · Replaced the cap on the inhaler when finished.      -We will also send prescription for albuterol inhaler to use as needed.        Current Smoker:  -Currently smoking about 2 packs/day.    Yared Mckeon  reports that he has been smoking cigarettes. He has been smoking about 2.00 packs per day. He has never used smokeless tobacco.. I have educated him on the risk of diseases from using tobacco products such as cancer, COPD and heart disease.     I advised him to quit and he is not willing to quit.    Patient's Body mass index is 26.63 kg/m². indicating that he is overweight (BMI 25-29.9). Patient's (Body mass index is 26.63 kg/m².) indicates that they are overweight with health conditions that include none . Weight is unchanged. BMI is is above average; BMI management plan is completed. We discussed portion control and increasing exercise. .          Follow Up   Return in about 2 months (around 4/23/2022).  Patient was given instructions and counseling regarding his condition or for health maintenance advice. Please see specific information pulled into the AVS if appropriate.

## 2022-05-17 ENCOUNTER — HOSPITAL ENCOUNTER (EMERGENCY)
Facility: HOSPITAL | Age: 72
Discharge: LEFT BEFORE DISCHARGE | End: 2022-05-18
Attending: EMERGENCY MEDICINE | Admitting: EMERGENCY MEDICINE

## 2022-05-17 ENCOUNTER — APPOINTMENT (OUTPATIENT)
Dept: GENERAL RADIOLOGY | Facility: HOSPITAL | Age: 72
End: 2022-05-17

## 2022-05-17 VITALS
BODY MASS INDEX: 26.37 KG/M2 | SYSTOLIC BLOOD PRESSURE: 107 MMHG | RESPIRATION RATE: 18 BRPM | WEIGHT: 168 LBS | TEMPERATURE: 97 F | OXYGEN SATURATION: 96 % | HEART RATE: 98 BPM | HEIGHT: 67 IN | DIASTOLIC BLOOD PRESSURE: 61 MMHG

## 2022-05-17 DIAGNOSIS — R06.02 SHORTNESS OF BREATH: Primary | ICD-10-CM

## 2022-05-17 PROCEDURE — 93010 ELECTROCARDIOGRAM REPORT: CPT | Performed by: INTERNAL MEDICINE

## 2022-05-17 PROCEDURE — 71045 X-RAY EXAM CHEST 1 VIEW: CPT

## 2022-05-17 PROCEDURE — 99283 EMERGENCY DEPT VISIT LOW MDM: CPT

## 2022-05-17 PROCEDURE — 93005 ELECTROCARDIOGRAM TRACING: CPT | Performed by: EMERGENCY MEDICINE

## 2022-05-17 RX ORDER — SODIUM CHLORIDE 0.9 % (FLUSH) 0.9 %
10 SYRINGE (ML) INJECTION AS NEEDED
Status: DISCONTINUED | OUTPATIENT
Start: 2022-05-17 | End: 2022-05-18 | Stop reason: HOSPADM

## 2022-05-18 LAB
QT INTERVAL: 368 MS
QTC INTERVAL: 467 MS
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
## 2022-09-01 ENCOUNTER — OFFICE VISIT (OUTPATIENT)
Dept: CARDIOLOGY | Facility: CLINIC | Age: 72
End: 2022-09-01

## 2022-09-01 VITALS
SYSTOLIC BLOOD PRESSURE: 132 MMHG | HEART RATE: 60 BPM | BODY MASS INDEX: 24.23 KG/M2 | HEIGHT: 67 IN | OXYGEN SATURATION: 98 % | WEIGHT: 154.4 LBS | DIASTOLIC BLOOD PRESSURE: 75 MMHG

## 2022-09-01 DIAGNOSIS — Z01.810 PRE-OPERATIVE CARDIOVASCULAR EXAMINATION: ICD-10-CM

## 2022-09-01 DIAGNOSIS — I48.0 PAROXYSMAL ATRIAL FIBRILLATION: Primary | Chronic | ICD-10-CM

## 2022-09-01 DIAGNOSIS — I50.22 CHRONIC SYSTOLIC CONGESTIVE HEART FAILURE: ICD-10-CM

## 2022-09-01 PROBLEM — I48.91 A-FIB: Chronic | Status: ACTIVE | Noted: 2019-05-06

## 2022-09-01 PROCEDURE — 99214 OFFICE O/P EST MOD 30 MIN: CPT | Performed by: NURSE PRACTITIONER

## 2022-09-01 PROCEDURE — 93000 ELECTROCARDIOGRAM COMPLETE: CPT | Performed by: NURSE PRACTITIONER

## 2022-09-01 RX ORDER — METHOCARBAMOL 750 MG/1
750 TABLET, FILM COATED ORAL 4 TIMES DAILY PRN
COMMUNITY

## 2022-09-01 RX ORDER — SACUBITRIL AND VALSARTAN 24; 26 MG/1; MG/1
1 TABLET, FILM COATED ORAL 2 TIMES DAILY
COMMUNITY

## 2022-09-01 NOTE — PROGRESS NOTES
"Chief Complaint  Shortness of Breath (Patient states cough , shortness of breath , light headedness , bruising )    Subjective          Yared Mckeon presents to [facility] CARDIOLOGY for usual follow-up.    History of Present Illness    Mr. Mckeon was last seen in clinic on 1/26/2021.  At that visit patient's metoprolol was adjusted and he was asked to have a echocardiogram completed in April.  He did not have the echocardiogram.    At today's visit Mr. Mckeon denies any chest pain.  He does report some dyspnea on exertion and occasional irregular heartbeats.  He states it stable for him and not worsening.    Mr. Mckeon requests a perioperative risk assessment for cataract surgery.    Objective     Vital Signs:   /75 (BP Location: Left arm, Patient Position: Sitting, Cuff Size: Adult)   Pulse 60   Ht 170.2 cm (67\")   Wt 70 kg (154 lb 6.4 oz)   SpO2 98%   BMI 24.18 kg/m²       Physical Exam  Constitutional:       Appearance: Normal appearance. He is well-developed.   Cardiovascular:      Rate and Rhythm: Normal rate and regular rhythm.      Heart sounds: No murmur heard.    No friction rub. No gallop.   Pulmonary:      Effort: Pulmonary effort is normal. No respiratory distress.      Breath sounds: Normal breath sounds. No wheezing or rales.   Skin:     General: Skin is warm and dry.   Neurological:      Mental Status: He is alert and oriented to person, place, and time.   Psychiatric:         Mood and Affect: Mood normal.         Behavior: Behavior normal.          Result Review :            ECG 12 Lead    Date/Time: 9/1/2022 10:55 AM  Performed by: Licha Valdes APRN  Authorized by: Licha Valdes APRN   Comparison: compared with previous ECG from 5/17/2022  Similar to previous ECG  Comparison to previous ECG: Normal sinus rhythm, nonspecific ST abnormality, 97 bpm  Rhythm: sinus rhythm  Rate: normal  BPM: 60  Comments:              Current Outpatient Medications   Medication " Sig Dispense Refill   • albuterol sulfate  (90 Base) MCG/ACT inhaler Inhale 2 puffs Every 4 (Four) Hours As Needed for Wheezing. 18 g 11   • apixaban (ELIQUIS) 5 MG tablet tablet Take 1 tablet by mouth Every 12 (Twelve) Hours. 60 tablet 0   • Budeson-Glycopyrrol-Formoterol (Breztri Aerosphere) 160-9-4.8 MCG/ACT aerosol inhaler Inhale 2 puffs 2 (Two) Times a Day. 10.7 g 5   • ferrous gluconate (FERGON) 324 MG tablet Take 1 tablet by mouth every night at bedtime.     • furosemide (Lasix) 20 MG tablet Take 1 tablet by mouth 2 (Two) Times a Day. 60 tablet 0   • methocarbamol (ROBAXIN) 750 MG tablet Take 750 mg by mouth 4 (Four) Times a Day As Needed for Muscle Spasms.     • metoprolol succinate XL (TOPROL-XL) 25 MG 24 hr tablet Take 12.5 mg by mouth Daily.     • pantoprazole (PROTONIX) 40 MG EC tablet Take 1 tablet by mouth Daily. 30 tablet 0   • sacubitril-valsartan (Entresto) 24-26 MG tablet Take 1 tablet by mouth 2 (Two) Times a Day.     • spironolactone (ALDACTONE) 25 MG tablet Take 1/2 tablet by mouth Daily. 30 tablet 0     No current facility-administered medications for this visit.            Assessment and Plan    Problem List Items Addressed This Visit        Cardiac and Vasculature    A-fib (HCC) - Primary (Chronic)    Relevant Medications    sacubitril-valsartan (Entresto) 24-26 MG tablet    Chronic systolic congestive heart failure (HCC)    Relevant Medications    sacubitril-valsartan (Entresto) 24-26 MG tablet    Other Relevant Orders    Adult Transthoracic Echo Complete W/ Cont if Necessary Per Protocol      Other Visit Diagnoses     Pre-operative cardiovascular examination                  Follow Up     Medications were reviewed with the patient.    Paroxysmal atrial fibrillation is stable.  Patient is to continue Eliquis for stroke prophylaxis.    Chronic systolic heart failure appears to be well compensated.  Patient is to continue Entresto, metoprolol succinate, spironolactone, and  Lasix.    Hypertension is controlled.    Given that Mr. Mckeon needs a perioperative risk assessment and he has not had a recent echocardiogram, I have asked him to have this completed before I can release him for surgery.  He stated that he would get the echo completed.    Risk factor modification: Patient counseled regarding exercise.  Patient counseled to participate in physical activity 30 minutes a day most days of the week.    Return in about 6 months (around 3/1/2023).    Patient was given instructions and counseling regarding his condition or for health maintenance advice. Please see specific information pulled into the AVS if appropriate.

## 2022-09-23 ENCOUNTER — DOCUMENTATION (OUTPATIENT)
Dept: CARDIOLOGY | Facility: CLINIC | Age: 72
End: 2022-09-23

## 2022-09-23 NOTE — PROGRESS NOTES
Claudette-Operative Risk Assessment for Non-Cardiac-Surgery    Patient Name: Yared Mckeon    : 1950     Perioperative Cardiac risk assessment for: cataract surgery with MAC anesthesia      Date of Surgery: to be determined     Is the patient experiencing any current cardiac symptoms: No    Has the patient undergone any of the following test:  Echo: Yes, date: 2022  EKG: Yes, date: 2022  Stress Test: 2019 MPI indicates small sized infarct located in the apex with no significant ischemia.  Diaphragmatic attenuation artifact is present.  Cardiac Cath: No  Cardiac Stents: No  Valve Replacement/Repair: No  Coronary Bypass: No    List Any Anti-Coagulation Treatment: Eliquis 5 mg   List Any Anti-Platelet Treatment: No    Hold Anti-Coagulation: 2 days pre-procedure.    [x] Low Risk for major adverse cardiac event (MACE)  [] Moderate Risk for major adverse cardiac event (MACE)  [] Severe Risk for major adverse cardiac event (MACE)    Dora Love MA   22 12:39 EDT     Electronically signed by BILLY Gonzalez 10/07/22, 2:30 PM EDT.

## 2022-09-30 ENCOUNTER — HOSPITAL ENCOUNTER (OUTPATIENT)
Dept: CT IMAGING | Facility: HOSPITAL | Age: 72
Discharge: HOME | End: 2022-09-30

## 2022-09-30 ENCOUNTER — HOSPITAL ENCOUNTER (OUTPATIENT)
Dept: CARDIOLOGY | Facility: HOSPITAL | Age: 72
Discharge: HOME | End: 2022-09-30

## 2022-09-30 DIAGNOSIS — I50.22 CHRONIC SYSTOLIC CONGESTIVE HEART FAILURE: ICD-10-CM

## 2022-09-30 DIAGNOSIS — J98.4 CAVITARY LESION OF LUNG: ICD-10-CM

## 2022-09-30 LAB
BH CV ECHO MEAS - ACS: 2.3 CM
BH CV ECHO MEAS - AI P1/2T: 720.8 MSEC
BH CV ECHO MEAS - AO MAX PG: 6.3 MMHG
BH CV ECHO MEAS - AO MEAN PG: 3 MMHG
BH CV ECHO MEAS - AO ROOT DIAM: 3.1 CM
BH CV ECHO MEAS - AO V2 MAX: 125 CM/SEC
BH CV ECHO MEAS - AO V2 VTI: 30.9 CM
BH CV ECHO MEAS - EDV(CUBED): 150.6 ML
BH CV ECHO MEAS - EDV(MOD-SP4): 70.7 ML
BH CV ECHO MEAS - EF(MOD-SP4): 65.6 %
BH CV ECHO MEAS - ESV(CUBED): 65.2 ML
BH CV ECHO MEAS - ESV(MOD-SP4): 24.3 ML
BH CV ECHO MEAS - FS: 24.3 %
BH CV ECHO MEAS - IVS/LVPW: 0.89 CM
BH CV ECHO MEAS - IVSD: 0.85 CM
BH CV ECHO MEAS - LA DIMENSION: 3.5 CM
BH CV ECHO MEAS - LAT PEAK E' VEL: 8.5 CM/SEC
BH CV ECHO MEAS - LV DIASTOLIC VOL/BSA (35-75): 39.1 CM2
BH CV ECHO MEAS - LV MASS(C)D: 175 GRAMS
BH CV ECHO MEAS - LV SYSTOLIC VOL/BSA (12-30): 13.4 CM2
BH CV ECHO MEAS - LVIDD: 5.3 CM
BH CV ECHO MEAS - LVIDS: 4 CM
BH CV ECHO MEAS - LVOT AREA: 3.5 CM2
BH CV ECHO MEAS - LVOT DIAM: 2.1 CM
BH CV ECHO MEAS - LVPWD: 0.95 CM
BH CV ECHO MEAS - MED PEAK E' VEL: 8.4 CM/SEC
BH CV ECHO MEAS - PA ACC TIME: 0.08 SEC
BH CV ECHO MEAS - PA PR(ACCEL): 44.4 MMHG
BH CV ECHO MEAS - RAP SYSTOLE: 10 MMHG
BH CV ECHO MEAS - RVSP: 27.6 MMHG
BH CV ECHO MEAS - SI(MOD-SP4): 25.6 ML/M2
BH CV ECHO MEAS - SV(MOD-SP4): 46.4 ML
BH CV ECHO MEAS - TAPSE (>1.6): 2.5 CM
BH CV ECHO MEAS - TR MAX PG: 17.6 MMHG
BH CV ECHO MEAS - TR MAX VEL: 210 CM/SEC
LEFT ATRIUM VOLUME INDEX: 17.7 ML/M2
MAXIMAL PREDICTED HEART RATE: 148 BPM
STRESS TARGET HR: 126 BPM

## 2022-09-30 PROCEDURE — 93306 TTE W/DOPPLER COMPLETE: CPT | Performed by: INTERNAL MEDICINE

## 2022-09-30 PROCEDURE — 71250 CT THORAX DX C-: CPT | Performed by: RADIOLOGY

## 2022-09-30 PROCEDURE — 71250 CT THORAX DX C-: CPT

## 2022-09-30 PROCEDURE — 93306 TTE W/DOPPLER COMPLETE: CPT

## 2022-10-07 DIAGNOSIS — J98.4 CAVITARY LESION OF LUNG: Primary | ICD-10-CM

## 2022-10-07 NOTE — PROGRESS NOTES
Discussed CT chest with patient.  Cavitary lesion is still noted however it has decreased in size.  We will repeat CT chest in 6 months for further follow-up.

## 2022-10-10 ENCOUNTER — TELEPHONE (OUTPATIENT)
Dept: CARDIOLOGY | Facility: CLINIC | Age: 72
End: 2022-10-10

## 2022-10-10 NOTE — TELEPHONE ENCOUNTER
----- Message from BILLY Gonzalez sent at 10/7/2022  2:37 PM EDT -----  Please call patient.  Normal results.  The echocardiogram indicates that patient's pumping function of his heart has improved since his last echocardiogram.    Thanks, Licha

## 2023-03-28 ENCOUNTER — HOSPITAL ENCOUNTER (OUTPATIENT)
Dept: CT IMAGING | Facility: HOSPITAL | Age: 73
Discharge: HOME | End: 2023-03-28
Admitting: NURSE PRACTITIONER
Payer: MEDICARE

## 2023-03-28 DIAGNOSIS — J98.4 CAVITARY LESION OF LUNG: ICD-10-CM

## 2023-03-28 PROCEDURE — 71250 CT THORAX DX C-: CPT | Performed by: RADIOLOGY

## 2023-03-28 PROCEDURE — 71250 CT THORAX DX C-: CPT

## 2023-04-15 ENCOUNTER — APPOINTMENT (OUTPATIENT)
Dept: GENERAL RADIOLOGY | Facility: HOSPITAL | Age: 73
End: 2023-04-15
Payer: MEDICARE

## 2023-04-15 ENCOUNTER — APPOINTMENT (OUTPATIENT)
Dept: CT IMAGING | Facility: HOSPITAL | Age: 73
End: 2023-04-15
Payer: MEDICARE

## 2023-04-15 ENCOUNTER — HOSPITAL ENCOUNTER (OUTPATIENT)
Facility: HOSPITAL | Age: 73
LOS: 2 days | Discharge: HOME | End: 2023-04-17
Attending: EMERGENCY MEDICINE | Admitting: HOSPITALIST
Payer: MEDICARE

## 2023-04-15 DIAGNOSIS — F17.200 TOBACCO USE DISORDER: ICD-10-CM

## 2023-04-15 DIAGNOSIS — R41.89 UNRESPONSIVE EPISODE: Primary | ICD-10-CM

## 2023-04-15 DIAGNOSIS — R07.2 PRECORDIAL CHEST PAIN: ICD-10-CM

## 2023-04-15 DIAGNOSIS — R91.1 PULMONARY NODULE: ICD-10-CM

## 2023-04-15 PROBLEM — E78.5 HLD (HYPERLIPIDEMIA): Status: ACTIVE | Noted: 2023-04-15

## 2023-04-15 PROBLEM — Z78.9 ALCOHOL USE: Status: ACTIVE | Noted: 2023-04-15

## 2023-04-15 PROBLEM — R07.9 CHEST PAIN: Status: ACTIVE | Noted: 2023-04-15

## 2023-04-15 PROBLEM — R40.4 UNRESPONSIVE EPISODE: Status: ACTIVE | Noted: 2023-04-15

## 2023-04-15 LAB
ALBUMIN SERPL-MCNC: 3.9 G/DL (ref 3.5–5.2)
ALBUMIN/GLOB SERPL: 1.6 G/DL
ALP SERPL-CCNC: 63 U/L (ref 39–117)
ALT SERPL W P-5'-P-CCNC: 12 U/L (ref 1–41)
ALT SERPL W P-5'-P-CCNC: 13 U/L (ref 1–41)
ANION GAP SERPL CALCULATED.3IONS-SCNC: 19 MMOL/L (ref 5–15)
APTT PPP: 32.7 SECONDS (ref 22–39)
AST SERPL-CCNC: 22 U/L (ref 1–40)
AST SERPL-CCNC: 24 U/L (ref 1–40)
BASOPHILS # BLD AUTO: 0.06 10*3/MM3 (ref 0–0.2)
BASOPHILS NFR BLD AUTO: 0.9 % (ref 0–1.5)
BILIRUB SERPL-MCNC: 1.3 MG/DL (ref 0–1.2)
BUN BLDA-MCNC: 14 MG/DL (ref 8–26)
BUN SERPL-MCNC: 13 MG/DL (ref 8–23)
BUN/CREAT SERPL: 15.5 (ref 7–25)
CA-I BLDA-SCNC: 1.09 MMOL/L (ref 1.2–1.32)
CALCIUM SPEC-SCNC: 8.8 MG/DL (ref 8.6–10.5)
CHLORIDE BLDA-SCNC: 99 MMOL/L (ref 98–109)
CHLORIDE SERPL-SCNC: 100 MMOL/L (ref 98–107)
CO2 BLDA-SCNC: 22 MMOL/L (ref 24–29)
CO2 SERPL-SCNC: 21 MMOL/L (ref 22–29)
CREAT BLDA-MCNC: 1 MG/DL (ref 0.6–1.3)
CREAT SERPL-MCNC: 0.84 MG/DL (ref 0.76–1.27)
D DIMER PPP FEU-MCNC: 1.03 MCGFEU/ML (ref 0–0.72)
D-LACTATE SERPL-SCNC: 3.1 MMOL/L (ref 0.5–2)
DEPRECATED RDW RBC AUTO: 49.7 FL (ref 37–54)
EGFRCR SERPLBLD CKD-EPI 2021: 80 ML/MIN/1.73
EGFRCR SERPLBLD CKD-EPI 2021: 92.7 ML/MIN/1.73
EOSINOPHIL # BLD AUTO: 0.06 10*3/MM3 (ref 0–0.4)
EOSINOPHIL NFR BLD AUTO: 0.9 % (ref 0.3–6.2)
ERYTHROCYTE [DISTWIDTH] IN BLOOD BY AUTOMATED COUNT: 14.1 % (ref 12.3–15.4)
ETHANOL BLD-MCNC: 143 MG/DL (ref 0–10)
ETHANOL BLD-MCNC: 236 MG/DL (ref 0–10)
GLOBULIN UR ELPH-MCNC: 2.4 GM/DL
GLUCOSE BLDC GLUCOMTR-MCNC: 128 MG/DL (ref 70–130)
GLUCOSE SERPL-MCNC: 128 MG/DL (ref 65–99)
HCT VFR BLD AUTO: 40.3 % (ref 37.5–51)
HCT VFR BLDA CALC: 43 % (ref 38–51)
HGB BLD-MCNC: 13.9 G/DL (ref 13–17.7)
HGB BLDA-MCNC: 14.6 G/DL (ref 12–17)
HOLD SPECIMEN: NORMAL
IMM GRANULOCYTES # BLD AUTO: 0.02 10*3/MM3 (ref 0–0.05)
IMM GRANULOCYTES NFR BLD AUTO: 0.3 % (ref 0–0.5)
INR PPP: 1.3 (ref 0.8–1.2)
LYMPHOCYTES # BLD AUTO: 1.86 10*3/MM3 (ref 0.7–3.1)
LYMPHOCYTES NFR BLD AUTO: 27.4 % (ref 19.6–45.3)
MCH RBC QN AUTO: 33.2 PG (ref 26.6–33)
MCHC RBC AUTO-ENTMCNC: 34.5 G/DL (ref 31.5–35.7)
MCV RBC AUTO: 96.2 FL (ref 79–97)
MONOCYTES # BLD AUTO: 0.68 10*3/MM3 (ref 0.1–0.9)
MONOCYTES NFR BLD AUTO: 10 % (ref 5–12)
NEUTROPHILS NFR BLD AUTO: 4.1 10*3/MM3 (ref 1.7–7)
NEUTROPHILS NFR BLD AUTO: 60.5 % (ref 42.7–76)
NRBC BLD AUTO-RTO: 0 /100 WBC (ref 0–0.2)
PLATELET # BLD AUTO: 210 10*3/MM3 (ref 140–450)
PMV BLD AUTO: 10.2 FL (ref 6–12)
POTASSIUM BLDA-SCNC: 3.4 MMOL/L (ref 3.5–4.9)
POTASSIUM SERPL-SCNC: 3.6 MMOL/L (ref 3.5–5.2)
PROT SERPL-MCNC: 6.3 G/DL (ref 6–8.5)
PROTHROMBIN TIME: 15.1 SECONDS (ref 12.8–15.2)
RBC # BLD AUTO: 4.19 10*6/MM3 (ref 4.14–5.8)
SODIUM BLD-SCNC: 139 MMOL/L (ref 138–146)
SODIUM SERPL-SCNC: 140 MMOL/L (ref 136–145)
TROPONIN T SERPL HS-MCNC: 12 NG/L
TROPONIN T SERPL HS-MCNC: 16 NG/L
WBC NRBC COR # BLD: 6.78 10*3/MM3 (ref 3.4–10.8)
WHOLE BLOOD HOLD COAG: NORMAL
WHOLE BLOOD HOLD SPECIMEN: NORMAL

## 2023-04-15 PROCEDURE — 70450 CT HEAD/BRAIN W/O DYE: CPT

## 2023-04-15 PROCEDURE — 85379 FIBRIN DEGRADATION QUANT: CPT | Performed by: EMERGENCY MEDICINE

## 2023-04-15 PROCEDURE — 70496 CT ANGIOGRAPHY HEAD: CPT

## 2023-04-15 PROCEDURE — 0042T HC CT CEREBRAL PERFUSION W/WO CONTRAST: CPT

## 2023-04-15 PROCEDURE — 99222 1ST HOSP IP/OBS MODERATE 55: CPT | Performed by: NURSE PRACTITIONER

## 2023-04-15 PROCEDURE — 25510000001 IOPAMIDOL PER 1 ML

## 2023-04-15 PROCEDURE — 71275 CT ANGIOGRAPHY CHEST: CPT

## 2023-04-15 PROCEDURE — 85730 THROMBOPLASTIN TIME PARTIAL: CPT | Performed by: EMERGENCY MEDICINE

## 2023-04-15 PROCEDURE — 70498 CT ANGIOGRAPHY NECK: CPT

## 2023-04-15 PROCEDURE — 83605 ASSAY OF LACTIC ACID: CPT | Performed by: INTERNAL MEDICINE

## 2023-04-15 PROCEDURE — 71045 X-RAY EXAM CHEST 1 VIEW: CPT

## 2023-04-15 PROCEDURE — 85610 PROTHROMBIN TIME: CPT

## 2023-04-15 PROCEDURE — 80053 COMPREHEN METABOLIC PANEL: CPT | Performed by: EMERGENCY MEDICINE

## 2023-04-15 PROCEDURE — 82077 ASSAY SPEC XCP UR&BREATH IA: CPT | Performed by: EMERGENCY MEDICINE

## 2023-04-15 PROCEDURE — 99285 EMERGENCY DEPT VISIT HI MDM: CPT

## 2023-04-15 PROCEDURE — 84425 ASSAY OF VITAMIN B-1: CPT | Performed by: NURSE PRACTITIONER

## 2023-04-15 PROCEDURE — 25510000001 IOPAMIDOL PER 1 ML: Performed by: EMERGENCY MEDICINE

## 2023-04-15 PROCEDURE — 83880 ASSAY OF NATRIURETIC PEPTIDE: CPT | Performed by: INTERNAL MEDICINE

## 2023-04-15 PROCEDURE — 80047 BASIC METABLC PNL IONIZED CA: CPT

## 2023-04-15 PROCEDURE — 84484 ASSAY OF TROPONIN QUANT: CPT | Performed by: EMERGENCY MEDICINE

## 2023-04-15 PROCEDURE — 84145 PROCALCITONIN (PCT): CPT | Performed by: INTERNAL MEDICINE

## 2023-04-15 PROCEDURE — 85025 COMPLETE CBC W/AUTO DIFF WBC: CPT | Performed by: EMERGENCY MEDICINE

## 2023-04-15 PROCEDURE — 36415 COLL VENOUS BLD VENIPUNCTURE: CPT

## 2023-04-15 PROCEDURE — 93005 ELECTROCARDIOGRAM TRACING: CPT | Performed by: EMERGENCY MEDICINE

## 2023-04-15 PROCEDURE — 85014 HEMATOCRIT: CPT

## 2023-04-15 PROCEDURE — 82607 VITAMIN B-12: CPT | Performed by: NURSE PRACTITIONER

## 2023-04-15 RX ORDER — LORAZEPAM 0.5 MG/1
0.5 TABLET ORAL
Status: DISCONTINUED | OUTPATIENT
Start: 2023-04-15 | End: 2023-04-17 | Stop reason: HOSPADM

## 2023-04-15 RX ORDER — SODIUM CHLORIDE 0.9 % (FLUSH) 0.9 %
10 SYRINGE (ML) INJECTION AS NEEDED
Status: DISCONTINUED | OUTPATIENT
Start: 2023-04-15 | End: 2023-04-17 | Stop reason: HOSPADM

## 2023-04-15 RX ORDER — LORAZEPAM 2 MG/ML
0.5 INJECTION INTRAMUSCULAR
Status: DISCONTINUED | OUTPATIENT
Start: 2023-04-15 | End: 2023-04-17 | Stop reason: HOSPADM

## 2023-04-15 RX ORDER — LORAZEPAM 2 MG/ML
1 INJECTION INTRAMUSCULAR
Status: DISCONTINUED | OUTPATIENT
Start: 2023-04-15 | End: 2023-04-17 | Stop reason: HOSPADM

## 2023-04-15 RX ORDER — ASPIRIN 81 MG/1
81 TABLET, CHEWABLE ORAL DAILY
Status: DISCONTINUED | OUTPATIENT
Start: 2023-04-15 | End: 2023-04-17 | Stop reason: HOSPADM

## 2023-04-15 RX ORDER — LORAZEPAM 1 MG/1
1 TABLET ORAL
Status: DISCONTINUED | OUTPATIENT
Start: 2023-04-15 | End: 2023-04-17 | Stop reason: HOSPADM

## 2023-04-15 RX ORDER — NICOTINE 21 MG/24HR
1 PATCH, TRANSDERMAL 24 HOURS TRANSDERMAL NIGHTLY
Status: DISCONTINUED | OUTPATIENT
Start: 2023-04-15 | End: 2023-04-17 | Stop reason: HOSPADM

## 2023-04-15 RX ORDER — ASPIRIN 300 MG/1
300 SUPPOSITORY RECTAL DAILY
Status: DISCONTINUED | OUTPATIENT
Start: 2023-04-15 | End: 2023-04-17 | Stop reason: HOSPADM

## 2023-04-15 RX ADMIN — IOPAMIDOL 115 ML: 755 INJECTION, SOLUTION INTRAVENOUS at 19:34

## 2023-04-15 RX ADMIN — IOPAMIDOL 80 ML: 755 INJECTION, SOLUTION INTRAVENOUS at 22:11

## 2023-04-15 RX ADMIN — ASPIRIN 81 MG CHEWABLE TABLET 81 MG: 81 TABLET CHEWABLE at 20:18
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
## 2023-04-15 NOTE — Clinical Note
Level of Care: Telemetry [5]   Diagnosis: Unresponsive episode [694127]   Admitting Physician: YONI MOTA [[id number]]   Attending Physician: YONI MOTA [[id number]]   Isolate for COVID?: No [0]   Bed Request Comments: tele   Certification: I Certify That Inpatient Hospital Services Are Medically Necessary For Greater Than 2 Midnights
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
## 2023-04-16 ENCOUNTER — APPOINTMENT (OUTPATIENT)
Dept: CT IMAGING | Facility: HOSPITAL | Age: 73
End: 2023-04-16
Payer: MEDICARE

## 2023-04-16 ENCOUNTER — APPOINTMENT (OUTPATIENT)
Dept: GENERAL RADIOLOGY | Facility: HOSPITAL | Age: 73
End: 2023-04-16
Payer: MEDICARE

## 2023-04-16 PROBLEM — I10 HTN (HYPERTENSION): Status: ACTIVE | Noted: 2023-04-16

## 2023-04-16 PROBLEM — E87.20 LACTIC ACIDOSIS: Status: ACTIVE | Noted: 2023-04-16

## 2023-04-16 PROBLEM — Z72.0 TOBACCO ABUSE: Status: ACTIVE | Noted: 2023-04-16

## 2023-04-16 PROBLEM — M25.512 SHOULDER PAIN, LEFT: Status: ACTIVE | Noted: 2023-04-16

## 2023-04-16 PROBLEM — K21.9 GERD WITHOUT ESOPHAGITIS: Status: ACTIVE | Noted: 2023-04-16

## 2023-04-16 LAB
AMPHET+METHAMPHET UR QL: NEGATIVE
AMPHETAMINES UR QL: NEGATIVE
ANION GAP SERPL CALCULATED.3IONS-SCNC: 16 MMOL/L (ref 5–15)
B PARAPERT DNA SPEC QL NAA+PROBE: NOT DETECTED
B PERT DNA SPEC QL NAA+PROBE: NOT DETECTED
BARBITURATES UR QL SCN: NEGATIVE
BASOPHILS # BLD AUTO: 0.07 10*3/MM3 (ref 0–0.2)
BASOPHILS NFR BLD AUTO: 1.2 % (ref 0–1.5)
BENZODIAZ UR QL SCN: NEGATIVE
BILIRUB UR QL STRIP: NEGATIVE
BUN SERPL-MCNC: 13 MG/DL (ref 8–23)
BUN/CREAT SERPL: 21.3 (ref 7–25)
BUPRENORPHINE SERPL-MCNC: NEGATIVE NG/ML
C PNEUM DNA NPH QL NAA+NON-PROBE: NOT DETECTED
CALCIUM SPEC-SCNC: 8.9 MG/DL (ref 8.6–10.5)
CANNABINOIDS SERPL QL: NEGATIVE
CHLORIDE SERPL-SCNC: 100 MMOL/L (ref 98–107)
CHOLEST SERPL-MCNC: 169 MG/DL (ref 0–200)
CLARITY UR: CLEAR
CO2 SERPL-SCNC: 23 MMOL/L (ref 22–29)
COCAINE UR QL: NEGATIVE
COLOR UR: YELLOW
CREAT SERPL-MCNC: 0.61 MG/DL (ref 0.76–1.27)
D-LACTATE SERPL-SCNC: 1.4 MMOL/L (ref 0.5–2)
D-LACTATE SERPL-SCNC: 2.8 MMOL/L (ref 0.5–2)
DEPRECATED RDW RBC AUTO: 49.8 FL (ref 37–54)
EGFRCR SERPLBLD CKD-EPI 2021: 102.1 ML/MIN/1.73
EOSINOPHIL # BLD AUTO: 0.1 10*3/MM3 (ref 0–0.4)
EOSINOPHIL NFR BLD AUTO: 1.7 % (ref 0.3–6.2)
ERYTHROCYTE [DISTWIDTH] IN BLOOD BY AUTOMATED COUNT: 14 % (ref 12.3–15.4)
FLUAV SUBTYP SPEC NAA+PROBE: NOT DETECTED
FLUBV RNA ISLT QL NAA+PROBE: NOT DETECTED
GLUCOSE BLDC GLUCOMTR-MCNC: 103 MG/DL (ref 70–130)
GLUCOSE BLDC GLUCOMTR-MCNC: 111 MG/DL (ref 70–130)
GLUCOSE BLDC GLUCOMTR-MCNC: 78 MG/DL (ref 70–130)
GLUCOSE SERPL-MCNC: 82 MG/DL (ref 65–99)
GLUCOSE UR STRIP-MCNC: NEGATIVE MG/DL
HADV DNA SPEC NAA+PROBE: NOT DETECTED
HBA1C MFR BLD: 5.3 % (ref 4.8–5.6)
HCOV 229E RNA SPEC QL NAA+PROBE: NOT DETECTED
HCOV HKU1 RNA SPEC QL NAA+PROBE: NOT DETECTED
HCOV NL63 RNA SPEC QL NAA+PROBE: NOT DETECTED
HCOV OC43 RNA SPEC QL NAA+PROBE: NOT DETECTED
HCT VFR BLD AUTO: 41.7 % (ref 37.5–51)
HDLC SERPL-MCNC: 78 MG/DL (ref 40–60)
HGB BLD-MCNC: 14.2 G/DL (ref 13–17.7)
HGB UR QL STRIP.AUTO: NEGATIVE
HMPV RNA NPH QL NAA+NON-PROBE: NOT DETECTED
HPIV1 RNA ISLT QL NAA+PROBE: NOT DETECTED
HPIV2 RNA SPEC QL NAA+PROBE: NOT DETECTED
HPIV3 RNA NPH QL NAA+PROBE: NOT DETECTED
HPIV4 P GENE NPH QL NAA+PROBE: NOT DETECTED
IMM GRANULOCYTES # BLD AUTO: 0.02 10*3/MM3 (ref 0–0.05)
IMM GRANULOCYTES NFR BLD AUTO: 0.3 % (ref 0–0.5)
INR PPP: 0.95 (ref 0.84–1.13)
KETONES UR QL STRIP: ABNORMAL
LDLC SERPL CALC-MCNC: 79 MG/DL (ref 0–100)
LDLC/HDLC SERPL: 1.01 {RATIO}
LEUKOCYTE ESTERASE UR QL STRIP.AUTO: NEGATIVE
LYMPHOCYTES # BLD AUTO: 1.47 10*3/MM3 (ref 0.7–3.1)
LYMPHOCYTES NFR BLD AUTO: 24.9 % (ref 19.6–45.3)
M PNEUMO IGG SER IA-ACNC: NOT DETECTED
MAGNESIUM SERPL-MCNC: 1.8 MG/DL (ref 1.6–2.4)
MCH RBC QN AUTO: 32.9 PG (ref 26.6–33)
MCHC RBC AUTO-ENTMCNC: 34.1 G/DL (ref 31.5–35.7)
MCV RBC AUTO: 96.5 FL (ref 79–97)
METHADONE UR QL SCN: NEGATIVE
MONOCYTES # BLD AUTO: 0.73 10*3/MM3 (ref 0.1–0.9)
MONOCYTES NFR BLD AUTO: 12.4 % (ref 5–12)
NEUTROPHILS NFR BLD AUTO: 3.52 10*3/MM3 (ref 1.7–7)
NEUTROPHILS NFR BLD AUTO: 59.5 % (ref 42.7–76)
NITRITE UR QL STRIP: NEGATIVE
NRBC BLD AUTO-RTO: 0 /100 WBC (ref 0–0.2)
NT-PROBNP SERPL-MCNC: 42.6 PG/ML (ref 0–900)
OPIATES UR QL: NEGATIVE
OXYCODONE UR QL SCN: NEGATIVE
PCP UR QL SCN: NEGATIVE
PH UR STRIP.AUTO: <=5 [PH] (ref 5–8)
PLATELET # BLD AUTO: 181 10*3/MM3 (ref 140–450)
PMV BLD AUTO: 10.7 FL (ref 6–12)
POTASSIUM SERPL-SCNC: 3.9 MMOL/L (ref 3.5–5.2)
PROCALCITONIN SERPL-MCNC: 0.04 NG/ML (ref 0–0.25)
PROPOXYPH UR QL: NEGATIVE
PROT UR QL STRIP: ABNORMAL
PROTHROMBIN TIME: 12.6 SECONDS (ref 11.4–14.4)
QT INTERVAL: 436 MS
QT INTERVAL: 466 MS
QTC INTERVAL: 441 MS
QTC INTERVAL: 460 MS
RBC # BLD AUTO: 4.32 10*6/MM3 (ref 4.14–5.8)
RHINOVIRUS RNA SPEC NAA+PROBE: NOT DETECTED
RSV RNA NPH QL NAA+NON-PROBE: NOT DETECTED
SARS-COV-2 RNA NPH QL NAA+NON-PROBE: NOT DETECTED
SODIUM SERPL-SCNC: 139 MMOL/L (ref 136–145)
SP GR UR STRIP: 1.08 (ref 1–1.03)
TRICYCLICS UR QL SCN: NEGATIVE
TRIGL SERPL-MCNC: 63 MG/DL (ref 0–150)
TROPONIN T SERPL HS-MCNC: 11 NG/L
TROPONIN T SERPL HS-MCNC: 14 NG/L
TSH SERPL DL<=0.05 MIU/L-ACNC: 3.07 UIU/ML (ref 0.27–4.2)
UROBILINOGEN UR QL STRIP: ABNORMAL
VIT B12 BLD-MCNC: 335 PG/ML (ref 211–946)
VLDLC SERPL-MCNC: 12 MG/DL (ref 5–40)
WBC NRBC COR # BLD: 5.91 10*3/MM3 (ref 3.4–10.8)

## 2023-04-16 PROCEDURE — 83036 HEMOGLOBIN GLYCOSYLATED A1C: CPT | Performed by: NURSE PRACTITIONER

## 2023-04-16 PROCEDURE — 25010000002 DIGOXIN PER 500 MCG: Performed by: NURSE PRACTITIONER

## 2023-04-16 PROCEDURE — 74176 CT ABD & PELVIS W/O CONTRAST: CPT

## 2023-04-16 PROCEDURE — 73030 X-RAY EXAM OF SHOULDER: CPT

## 2023-04-16 PROCEDURE — 92523 SPEECH SOUND LANG COMPREHEN: CPT

## 2023-04-16 PROCEDURE — 82962 GLUCOSE BLOOD TEST: CPT

## 2023-04-16 PROCEDURE — 96374 THER/PROPH/DIAG INJ IV PUSH: CPT

## 2023-04-16 PROCEDURE — 97165 OT EVAL LOW COMPLEX 30 MIN: CPT

## 2023-04-16 PROCEDURE — 80306 DRUG TEST PRSMV INSTRMNT: CPT | Performed by: NURSE PRACTITIONER

## 2023-04-16 PROCEDURE — 81003 URINALYSIS AUTO W/O SCOPE: CPT | Performed by: NURSE PRACTITIONER

## 2023-04-16 PROCEDURE — 85025 COMPLETE CBC W/AUTO DIFF WBC: CPT | Performed by: NURSE PRACTITIONER

## 2023-04-16 PROCEDURE — 70450 CT HEAD/BRAIN W/O DYE: CPT

## 2023-04-16 PROCEDURE — 83605 ASSAY OF LACTIC ACID: CPT | Performed by: INTERNAL MEDICINE

## 2023-04-16 PROCEDURE — 93005 ELECTROCARDIOGRAM TRACING: CPT | Performed by: NURSE PRACTITIONER

## 2023-04-16 PROCEDURE — 84443 ASSAY THYROID STIM HORMONE: CPT | Performed by: NURSE PRACTITIONER

## 2023-04-16 PROCEDURE — 85610 PROTHROMBIN TIME: CPT | Performed by: NURSE PRACTITIONER

## 2023-04-16 PROCEDURE — 97535 SELF CARE MNGMENT TRAINING: CPT

## 2023-04-16 PROCEDURE — 83735 ASSAY OF MAGNESIUM: CPT | Performed by: NURSE PRACTITIONER

## 2023-04-16 PROCEDURE — 84484 ASSAY OF TROPONIN QUANT: CPT | Performed by: NURSE PRACTITIONER

## 2023-04-16 PROCEDURE — 80048 BASIC METABOLIC PNL TOTAL CA: CPT | Performed by: NURSE PRACTITIONER

## 2023-04-16 PROCEDURE — 80061 LIPID PANEL: CPT | Performed by: NURSE PRACTITIONER

## 2023-04-16 PROCEDURE — 25010000002 THIAMINE PER 100 MG: Performed by: INTERNAL MEDICINE

## 2023-04-16 PROCEDURE — 97161 PT EVAL LOW COMPLEX 20 MIN: CPT

## 2023-04-16 PROCEDURE — 0202U NFCT DS 22 TRGT SARS-COV-2: CPT | Performed by: NURSE PRACTITIONER

## 2023-04-16 RX ORDER — SODIUM CHLORIDE 0.9 % (FLUSH) 0.9 %
10 SYRINGE (ML) INJECTION AS NEEDED
Status: DISCONTINUED | OUTPATIENT
Start: 2023-04-16 | End: 2023-04-16

## 2023-04-16 RX ORDER — DIGOXIN 0.25 MG/ML
250 INJECTION INTRAMUSCULAR; INTRAVENOUS ONCE
Status: COMPLETED | OUTPATIENT
Start: 2023-04-16 | End: 2023-04-16

## 2023-04-16 RX ORDER — ACETAMINOPHEN 325 MG/1
650 TABLET ORAL EVERY 6 HOURS PRN
Status: DISCONTINUED | OUTPATIENT
Start: 2023-04-16 | End: 2023-04-17 | Stop reason: HOSPADM

## 2023-04-16 RX ORDER — FERROUS SULFATE 325(65) MG
325 TABLET ORAL
Status: DISCONTINUED | OUTPATIENT
Start: 2023-04-16 | End: 2023-04-17 | Stop reason: HOSPADM

## 2023-04-16 RX ORDER — METHOCARBAMOL 750 MG/1
750 TABLET, FILM COATED ORAL 4 TIMES DAILY PRN
Status: DISCONTINUED | OUTPATIENT
Start: 2023-04-16 | End: 2023-04-17 | Stop reason: HOSPADM

## 2023-04-16 RX ORDER — METOPROLOL SUCCINATE 25 MG/1
12.5 TABLET, EXTENDED RELEASE ORAL DAILY
Status: DISCONTINUED | OUTPATIENT
Start: 2023-04-16 | End: 2023-04-17 | Stop reason: HOSPADM

## 2023-04-16 RX ORDER — SODIUM CHLORIDE 9 MG/ML
40 INJECTION, SOLUTION INTRAVENOUS AS NEEDED
Status: DISCONTINUED | OUTPATIENT
Start: 2023-04-16 | End: 2023-04-17 | Stop reason: HOSPADM

## 2023-04-16 RX ORDER — SODIUM CHLORIDE 9 MG/ML
40 INJECTION, SOLUTION INTRAVENOUS AS NEEDED
Status: DISCONTINUED | OUTPATIENT
Start: 2023-04-16 | End: 2023-04-16

## 2023-04-16 RX ORDER — ATORVASTATIN CALCIUM 40 MG/1
80 TABLET, FILM COATED ORAL NIGHTLY
Status: DISCONTINUED | OUTPATIENT
Start: 2023-04-16 | End: 2023-04-17 | Stop reason: HOSPADM

## 2023-04-16 RX ORDER — PANTOPRAZOLE SODIUM 40 MG/1
40 TABLET, DELAYED RELEASE ORAL DAILY
Status: DISCONTINUED | OUTPATIENT
Start: 2023-04-16 | End: 2023-04-17 | Stop reason: HOSPADM

## 2023-04-16 RX ORDER — SODIUM CHLORIDE 0.9 % (FLUSH) 0.9 %
10 SYRINGE (ML) INJECTION EVERY 12 HOURS SCHEDULED
Status: DISCONTINUED | OUTPATIENT
Start: 2023-04-16 | End: 2023-04-16

## 2023-04-16 RX ADMIN — ASPIRIN 81 MG CHEWABLE TABLET 81 MG: 81 TABLET CHEWABLE at 09:18

## 2023-04-16 RX ADMIN — METOPROLOL SUCCINATE 12.5 MG: 25 TABLET, EXTENDED RELEASE ORAL at 10:56

## 2023-04-16 RX ADMIN — Medication 1 PATCH: at 00:39

## 2023-04-16 RX ADMIN — ATORVASTATIN CALCIUM 80 MG: 40 TABLET, FILM COATED ORAL at 21:22

## 2023-04-16 RX ADMIN — ACETAMINOPHEN 325MG 650 MG: 325 TABLET ORAL at 04:56

## 2023-04-16 RX ADMIN — DIGOXIN 250 MCG: 0.25 INJECTION INTRAMUSCULAR; INTRAVENOUS at 05:58

## 2023-04-16 RX ADMIN — ACETAMINOPHEN 325MG 650 MG: 325 TABLET ORAL at 10:56

## 2023-04-16 RX ADMIN — ATORVASTATIN CALCIUM 80 MG: 40 TABLET, FILM COATED ORAL at 01:37

## 2023-04-16 RX ADMIN — Medication 1 PATCH: at 21:24

## 2023-04-16 RX ADMIN — PANTOPRAZOLE SODIUM 40 MG: 40 TABLET, DELAYED RELEASE ORAL at 09:19

## 2023-04-16 RX ADMIN — SODIUM CHLORIDE 500 ML: 9 INJECTION, SOLUTION INTRAVENOUS at 05:59

## 2023-04-16 RX ADMIN — Medication 10 ML: at 00:39

## 2023-04-16 RX ADMIN — FERROUS SULFATE TAB 325 MG (65 MG ELEMENTAL FE) 325 MG: 325 (65 FE) TAB at 09:19

## 2023-04-16 RX ADMIN — SODIUM CHLORIDE 500 ML: 9 INJECTION, SOLUTION INTRAVENOUS at 05:00

## 2023-04-16 RX ADMIN — THIAMINE HYDROCHLORIDE 100 ML/HR: 100 INJECTION, SOLUTION INTRAMUSCULAR; INTRAVENOUS at 00:38

## 2023-04-16 RX ADMIN — Medication 10 ML: at 09:19

## 2023-04-16 NOTE — PLAN OF CARE
Goal Outcome Evaluation:  Plan of Care Reviewed With: patient           Outcome Evaluation: Pt presents with decreased balance and endurance compared to baseline contributing to impairments in ambulation and overall fxnl mobility. Pt will benefit from PT to address aforementioned deficits and return to PLOF. PT rec home with OP PT upon dc.

## 2023-04-16 NOTE — PLAN OF CARE
Goal Outcome Evaluation:  Plan of Care Reviewed With: patient, family        Progress: no change  Outcome Evaluation: Pt presents below his functional baseline with deficits including generalized weakness, L shoulder dislocation with reduction, impaired balance, and decreased activity tolerance warranting skilled OT services. Issued sling for LUE and educated on wearing with mobility for support and how to don/doff. No ortho consult at this time. Rec home with 24/7 assist and OP therapy services at [place].

## 2023-04-16 NOTE — PLAN OF CARE
Goal Outcome Evaluation:              Outcome Evaluation: Completed cognitive communication evaluation. Pt appears to be at baseline.  No further ST indicated.
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
## 2023-04-16 NOTE — CONSULTS
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 determine if it is an Acute Myocardial Infarction or myocardial injury due to an underlying chronic condition.         CBC & Differential [[id number]]  (Abnormal) Collected: 04/15/23 1931    Specimen: Blood Updated: 04/15/23 1940    Narrative:      The following orders were created for panel order CBC & Differential.  Procedure                               Abnormality         Status                     ---------                               -----------         ------                     CBC Auto Differential[[id number]]        Abnormal            Final result                 Please view results for these tests on the individual orders.    CBC Auto Differential [[id number]]  (Abnormal) Collected: 04/15/23 1931    Specimen: Blood Updated: 04/15/23 1940     WBC 6.78 10*3/mm3      RBC 4.19 10*6/mm3      Hemoglobin 13.9 g/dL      Hematocrit 40.3 %      MCV 96.2 fL      MCH 33.2 pg      MCHC 34.5 g/dL      RDW 14.1 %      RDW-SD 49.7 fl      MPV 10.2 fL      Platelets 210 10*3/mm3      Neutrophil % 60.5 %      Lymphocyte % 27.4 %      Monocyte % 10.0 %      Eosinophil % 0.9 %      Basophil % 0.9 %      Immature Grans % 0.3 %      Neutrophils, Absolute 4.10 10*3/mm3      Lymphocytes, Absolute 1.86 10*3/mm3      Monocytes,  Absolute 0.68 10*3/mm3      Eosinophils, Absolute 0.06 10*3/mm3      Basophils, Absolute 0.06 10*3/mm3      Immature Grans, Absolute 0.02 10*3/mm3      nRBC 0.0 /100 WBC     Vitamin B12 [[id number]] Collected: 04/15/23 1931    Specimen: Blood Updated: 04/15/23 1940    Vitamin B1, Whole Blood [[id number]] Collected: 04/15/23 1931    Specimen: Blood Updated: 04/15/23 1937    POC CHEM 8 [[id number]]  (Abnormal) Collected: 04/15/23 1933    Specimen: Blood Updated: 04/15/23 1936     Glucose 128 mg/dL      BUN 14 mg/dL      Creatinine 1.00 mg/dL      Sodium 139 mmol/L      POC Potassium 3.4 mmol/L      Chloride 99 mmol/L      Total CO2 22 mmol/L      Hemoglobin 14.6 g/dL      Comment: Serial Number: [id number]:  [id number]        Hematocrit 43 %      Ionized Calcium 1.09 mmol/L      eGFR 80.0 mL/min/1.73     POC Protime / INR [[id number]]  (Abnormal) Collected: 04/15/23 1932    Specimen: Blood Updated: 04/15/23 1935     Protime 15.1 seconds      INR 1.3     Comment: Serial Number: [id number]:  [id number]           ECG/EMG Results (last 24 hours)     Procedure Component Value Units Date/Time    ECG 12 Lead ED Triage Standing Order; Acute Stroke (Onset <24 hrs) [[id number]] Collected: 04/15/23 2015     Updated: 04/15/23 2016     QT Interval 436 ms      QTC Interval 460 ms     Narrative:      Test Reason : ED Triage Standing Order~  Blood Pressure :   */*   mmHG  Vent. Rate :  67 BPM     Atrial Rate :  67 BPM     P-R Int : 160 ms          QRS Dur :  86 ms      QT Int : 436 ms       P-R-T Axes :  47  10  58 degrees     QTc Int : 460 ms    Normal sinus rhythm  Normal ECG  When compared with ECG of 17-MAY-2022 22:34,  No significant change was found    Referred By: EDMD           Confirmed By:     ECG 12 Lead Altered Mental Status [[id number]] Collected: 04/15/23 2351     Updated: 04/15/23 2351     QT Interval 466 ms      QTC Interval 441 ms     Narrative:      Test Reason : Altered Mental Status  Blood Pressure :   */*   mmHG  Vent.  Rate :  54 BPM     Atrial Rate :  54 BPM     P-R Int : 154 ms          QRS Dur :  88 ms      QT Int : 466 ms       P-R-T Axes :  -3  13  57 degrees     QTc Int : 441 ms    Sinus bradycardia with premature atrial complexes  Otherwise normal ECG  When compared with ECG of 15-APR-2023 20:15, (Unconfirmed)  premature atrial complexes are now present    Referred By: EDMD           Confirmed By:     ECG 12 Lead Chest Pain [[id number]] Collected: 04/16/23 0446     Updated: 04/16/23 0446     QT Interval 342 ms      QTC Interval 448 ms     Narrative:      Test Reason : Chest Pain  Blood Pressure :   */*   mmHG  Vent. Rate : 103 BPM     Atrial Rate : 103 BPM     P-R Int : 150 ms          QRS Dur :  84 ms      QT Int : 342 ms       P-R-T Axes :  58  -7  50 degrees     QTc Int : 448 ms    Sinus tachycardia  Nonspecific ST abnormality  Abnormal ECG  When compared with ECG of 15-APR-2023 23:51, (Unconfirmed)  premature atrial complexes are no longer present  Vent. rate has increased BY  49 BPM    Referred By:            Confirmed By:                Unresponsive episode    A-fib (HCC)    Chronic systolic congestive heart failure (HCC)    Pulmonary nodule    Alcohol use    Chest pain    HLD (hyperlipidemia)    Lactic acidosis    Tobacco abuse    Shoulder pain, left    HTN (hypertension)    GERD without esophagitis        ASSESSMENT/PLAN:  Syncope with recent palpitations historical atrial fibrillation/flutter with conversion pauses.  Observe overnight 1 week E patch follow-up with [place] [facility]  Flat troponin, nonischemic EKG, no prolonged QT  Follow-up echocardiogram previously ordered    Reiterated that alcohol intake will greatly increase his likelihood of recurrent atrial arrhythmia    Smoking cessation counseling greater than 5 minutes          Khai Tellez MD   13:10 EDT 4/16/2023
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
## 2023-04-16 NOTE — SIGNIFICANT NOTE
Attested the consult note.  Less likely an ischemic vascular event of the brain.  Likely cardiac or metabolic in etiology.  Okay to resume full dose of Eliquis.

## 2023-04-16 NOTE — H&P
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 w/ mild generalized abdominal tenderness on exam)  -holding routine diuretics for now     **Left shoulder pain   -s/p fall this past Tuesday (fell through mi); seen at [facility] and dx w/ dislocation- placed under sedation and shoulder reduced but pt w/ continued pain and decreased ROM   -repeat STAT XRAYS pending   -symptom mgt   -pt,ot consult   -consider Ortho consult vs CT vs MRI if pain persists     **Alcohol use   -EMR reports alcohol abuse and hospitalization for alcoholic pancreatitis  -Pt reports that he only consumes alcohol every 2-3 weeks; when he does drink he consumes ~4-5 whiskey shots   -pt drank 4-5 shots Saturday afternoon prior to chest pain/events   -ethanol level 236 in ED  -UDS pending   -CIWA scoring w/ PRN ativan   -thiamine     **Pulmonary nodule  -per CTA Chest- Small right lower lobe pulmonary nodule. Follow-up in one year is recommended.    **Tobacco use  -cessation education   -nicotine patch     **GERD  -continue Protonix     DVT prophylaxis:  Mechanical (Eliquis on hold pending repeat CT head)    CODE STATUS:    Code Status (Patient has no pulse and is not breathing): CPR (Attempt to Resuscitate)  Medical Interventions (Patient has pulse or is breathing): Full Support    Expected Discharge  Expected Discharge Date and Time     " Expected Discharge Date Expected Discharge Time    Apr 18, 2023         This note has been completed as part of a split-shared workflow.     Signature: Electronically signed by BILLY Vieira, 04/16/23, 1:06 AM EDT.    Time APC spent: 55 minutes  Total attending time spent: 30 minutes      Attending   Admission Attestation       I have performed an independent face-to-face diagnostic evaluation including performing an independent physical examination as documented here.  The documented plan of care above was reviewed and developed with the advanced practice clinician (APC).      Brief Summary Statement:   Yared Mckeon is a 72 y.o. male with a PMH significant for systolic/diastolic CHF, HTN, HLD, paroxysmal atrial fibrillation on Eliquis, history of alcohol abuse with alcoholic pancreatitis, tobacco abuse who comes to the ED due to chest pain and unresponsiveness.  Earlier on Saturday patient developed severe midsternal chest pain while at rest with radiation to bilateral arms.  He reports associated shortness of breath.  EMS was called and in route to the hospital patient had a spell of unresponsiveness lasting around 8-9 minutes.  Remainder of detailed HPI is as noted by APC and has been reviewed and/or edited by me for completeness.    Attending Physical Exam:  Temp:  [98.1 °F (36.7 °C)-98.5 °F (36.9 °C)] 98.1 °F (36.7 °C)  Heart Rate:  [56-80] 79  Resp:  [18-20] 20  BP: (143)/(66-82) 143/82    Constitutional: Awake, alert  Eyes: PERRLA, sclerae anicteric, no conjunctival injection  HENT: NCAT, mucous membranes moist  Neck: Supple, no thyromegaly, no lymphadenopathy, trachea midline  Respiratory: Clear to auscultation bilaterally, nonlabored respirations   Cardiovascular: RRR, no murmurs, rubs, or gallops, palpable pedal pulses bilaterally  Gastrointestinal: Positive bowel sounds, soft, nontender, nondistended  Musculoskeletal: No bilateral ankle edema, no clubbing or cyanosis to extremities  Psychiatric:  Appropriate affect, cooperative  Neurologic: Oriented x 3, strength symmetric in all extremities, Cranial Nerves grossly intact to confrontation, speech clear  Skin: No rashes      Brief Assessment/Plan :  See detailed assessment and plan developed with APC which I have reviewed and/or edited for completeness.            Zuleika Conner,   04/16/23

## 2023-04-17 ENCOUNTER — READMISSION MANAGEMENT (OUTPATIENT)
Dept: CALL CENTER | Facility: HOSPITAL | Age: 73
End: 2023-04-17
Payer: MEDICARE

## 2023-04-17 ENCOUNTER — APPOINTMENT (OUTPATIENT)
Dept: CARDIOLOGY | Facility: HOSPITAL | Age: 73
End: 2023-04-17
Payer: MEDICARE

## 2023-04-17 ENCOUNTER — APPOINTMENT (OUTPATIENT)
Dept: NEUROLOGY | Facility: HOSPITAL | Age: 73
End: 2023-04-17
Payer: MEDICARE

## 2023-04-17 VITALS
BODY MASS INDEX: 23.86 KG/M2 | TEMPERATURE: 98.5 F | WEIGHT: 152 LBS | SYSTOLIC BLOOD PRESSURE: 146 MMHG | OXYGEN SATURATION: 96 % | HEIGHT: 67 IN | RESPIRATION RATE: 18 BRPM | DIASTOLIC BLOOD PRESSURE: 70 MMHG | HEART RATE: 57 BPM

## 2023-04-17 PROBLEM — R40.4 UNRESPONSIVE EPISODE: Status: RESOLVED | Noted: 2023-04-15 | Resolved: 2023-04-17

## 2023-04-17 PROBLEM — R55 SYNCOPE AND COLLAPSE: Status: RESOLVED | Noted: 2023-04-17 | Resolved: 2023-04-17

## 2023-04-17 PROBLEM — R41.89 UNRESPONSIVE EPISODE: Status: RESOLVED | Noted: 2023-04-15 | Resolved: 2023-04-17

## 2023-04-17 PROBLEM — R55 SYNCOPE AND COLLAPSE: Status: ACTIVE | Noted: 2023-04-17

## 2023-04-17 PROBLEM — E87.20 LACTIC ACIDOSIS: Status: RESOLVED | Noted: 2023-04-16 | Resolved: 2023-04-17

## 2023-04-17 PROBLEM — M25.512 SHOULDER PAIN, LEFT: Status: RESOLVED | Noted: 2023-04-16 | Resolved: 2023-04-17

## 2023-04-17 PROBLEM — R07.9 CHEST PAIN: Status: RESOLVED | Noted: 2023-04-15 | Resolved: 2023-04-17

## 2023-04-17 LAB
ASCENDING AORTA: 3.7 CM
BH CV ECHO MEAS - AI P1/2T: 730.3 MSEC
BH CV ECHO MEAS - AO MAX PG: 8.2 MMHG
BH CV ECHO MEAS - AO MEAN PG: 4 MMHG
BH CV ECHO MEAS - AO ROOT AREA (BSA CORRECTED): 1.9 CM2
BH CV ECHO MEAS - AO ROOT DIAM: 3.4 CM
BH CV ECHO MEAS - AO V2 MAX: 143 CM/SEC
BH CV ECHO MEAS - AO V2 VTI: 30.1 CM
BH CV ECHO MEAS - AVA(I,D): 2.6 CM2
BH CV ECHO MEAS - EDV(CUBED): 205.4 ML
BH CV ECHO MEAS - EDV(MOD-SP2): 101 ML
BH CV ECHO MEAS - EDV(MOD-SP4): 105 ML
BH CV ECHO MEAS - EF(MOD-BP): 63 %
BH CV ECHO MEAS - EF(MOD-SP2): 67.2 %
BH CV ECHO MEAS - EF(MOD-SP4): 60.2 %
BH CV ECHO MEAS - ESV(CUBED): 32.8 ML
BH CV ECHO MEAS - ESV(MOD-SP2): 33.1 ML
BH CV ECHO MEAS - ESV(MOD-SP4): 41.8 ML
BH CV ECHO MEAS - FS: 45.8 %
BH CV ECHO MEAS - IVS/LVPW: 1 CM
BH CV ECHO MEAS - IVSD: 0.8 CM
BH CV ECHO MEAS - LA DIMENSION: 3.9 CM
BH CV ECHO MEAS - LAT PEAK E' VEL: 11.1 CM/SEC
BH CV ECHO MEAS - LV DIASTOLIC VOL/BSA (35-75): 58.4 CM2
BH CV ECHO MEAS - LV MASS(C)D: 180.7 GRAMS
BH CV ECHO MEAS - LV MAX PG: 5.4 MMHG
BH CV ECHO MEAS - LV MEAN PG: 2 MMHG
BH CV ECHO MEAS - LV SYSTOLIC VOL/BSA (12-30): 23.2 CM2
BH CV ECHO MEAS - LV V1 MAX: 116 CM/SEC
BH CV ECHO MEAS - LV V1 VTI: 24.5 CM
BH CV ECHO MEAS - LVIDD: 5.9 CM
BH CV ECHO MEAS - LVIDS: 3.2 CM
BH CV ECHO MEAS - LVOT AREA: 3.1 CM2
BH CV ECHO MEAS - LVOT DIAM: 2 CM
BH CV ECHO MEAS - LVPWD: 0.8 CM
BH CV ECHO MEAS - MED PEAK E' VEL: 10.3 CM/SEC
BH CV ECHO MEAS - MR MAX PG: 83.2 MMHG
BH CV ECHO MEAS - MR MAX VEL: 456 CM/SEC
BH CV ECHO MEAS - MR MEAN PG: 55 MMHG
BH CV ECHO MEAS - MR MEAN VEL: 349 CM/SEC
BH CV ECHO MEAS - MR VTI: 174 CM
BH CV ECHO MEAS - MV A MAX VEL: 71.3 CM/SEC
BH CV ECHO MEAS - MV DEC SLOPE: 312.5 CM/SEC2
BH CV ECHO MEAS - MV DEC TIME: 0.21 MSEC
BH CV ECHO MEAS - MV E MAX VEL: 65 CM/SEC
BH CV ECHO MEAS - MV E/A: 0.91
BH CV ECHO MEAS - MV MAX PG: 2.38 MMHG
BH CV ECHO MEAS - MV MEAN PG: 1 MMHG
BH CV ECHO MEAS - MV P1/2T: 78.2 MSEC
BH CV ECHO MEAS - MV V2 VTI: 20.8 CM
BH CV ECHO MEAS - MVA(P1/2T): 2.8 CM2
BH CV ECHO MEAS - MVA(VTI): 3.7 CM2
BH CV ECHO MEAS - PA ACC TIME: 0.09 SEC
BH CV ECHO MEAS - PA PR(ACCEL): 37.2 MMHG
BH CV ECHO MEAS - PA V2 MAX: 96.5 CM/SEC
BH CV ECHO MEAS - PI END-D VEL: 102.7 CM/SEC
BH CV ECHO MEAS - RF(MV,LVOT)(1DIAM): 0.4 CM
BH CV ECHO MEAS - SI(MOD-SP2): 37.7 ML/M2
BH CV ECHO MEAS - SI(MOD-SP4): 35.1 ML/M2
BH CV ECHO MEAS - SV(LVOT): 77 ML
BH CV ECHO MEAS - SV(MOD-SP2): 67.9 ML
BH CV ECHO MEAS - SV(MOD-SP4): 63.2 ML
BH CV ECHO MEAS - TAPSE (>1.6): 2.22 CM
BH CV ECHO MEASUREMENTS AVERAGE E/E' RATIO: 6.07
BH CV VAS BP RIGHT ARM: NORMAL MMHG
BH CV XLRA - RV BASE: 3.5 CM
BH CV XLRA - RV LENGTH: 7.3 CM
BH CV XLRA - RV MID: 2.3 CM
BH CV XLRA - TDI S': 16 CM/SEC
DEPRECATED RDW RBC AUTO: 48.2 FL (ref 37–54)
ERYTHROCYTE [DISTWIDTH] IN BLOOD BY AUTOMATED COUNT: 13.7 % (ref 12.3–15.4)
HCT VFR BLD AUTO: 38.4 % (ref 37.5–51)
HEMOCCULT STL QL: NEGATIVE
HGB BLD-MCNC: 13.1 G/DL (ref 13–17.7)
IVRT: 102 MSEC
MAXIMAL PREDICTED HEART RATE: 148 BPM
MCH RBC QN AUTO: 32.4 PG (ref 26.6–33)
MCHC RBC AUTO-ENTMCNC: 34.1 G/DL (ref 31.5–35.7)
MCV RBC AUTO: 95 FL (ref 79–97)
PLATELET # BLD AUTO: 166 10*3/MM3 (ref 140–450)
PMV BLD AUTO: 10.9 FL (ref 6–12)
QT INTERVAL: 342 MS
QTC INTERVAL: 448 MS
RBC # BLD AUTO: 4.04 10*6/MM3 (ref 4.14–5.8)
STRESS TARGET HR: 126 BPM
WBC NRBC COR # BLD: 6.68 10*3/MM3 (ref 3.4–10.8)

## 2023-04-17 PROCEDURE — G0378 HOSPITAL OBSERVATION PER HR: HCPCS

## 2023-04-17 PROCEDURE — 85027 COMPLETE CBC AUTOMATED: CPT | Performed by: HOSPITALIST

## 2023-04-17 PROCEDURE — 95816 EEG AWAKE AND DROWSY: CPT

## 2023-04-17 PROCEDURE — 25010000002 THIAMINE PER 100 MG: Performed by: INTERNAL MEDICINE

## 2023-04-17 PROCEDURE — 93306 TTE W/DOPPLER COMPLETE: CPT

## 2023-04-17 PROCEDURE — 82272 OCCULT BLD FECES 1-3 TESTS: CPT | Performed by: HOSPITALIST

## 2023-04-17 RX ORDER — FUROSEMIDE 20 MG/1
20 TABLET ORAL 2 TIMES DAILY PRN
Start: 2023-04-17

## 2023-04-17 RX ORDER — ASPIRIN 81 MG/1
81 TABLET, CHEWABLE ORAL DAILY
Qty: 120 TABLET | Refills: 0 | Status: SHIPPED | OUTPATIENT
Start: 2023-04-17

## 2023-04-17 RX ORDER — ATORVASTATIN CALCIUM 80 MG/1
80 TABLET, FILM COATED ORAL NIGHTLY
Qty: 90 TABLET | Refills: 0 | Status: SHIPPED | OUTPATIENT
Start: 2023-04-17

## 2023-04-17 RX ADMIN — METOPROLOL SUCCINATE 12.5 MG: 25 TABLET, EXTENDED RELEASE ORAL at 09:01

## 2023-04-17 RX ADMIN — PANTOPRAZOLE SODIUM 40 MG: 40 TABLET, DELAYED RELEASE ORAL at 09:01

## 2023-04-17 RX ADMIN — APIXABAN 5 MG: 5 TABLET, FILM COATED ORAL at 13:28

## 2023-04-17 RX ADMIN — Medication 10 ML: at 09:02

## 2023-04-17 RX ADMIN — ASPIRIN 81 MG CHEWABLE TABLET 81 MG: 81 TABLET CHEWABLE at 09:01

## 2023-04-17 RX ADMIN — THIAMINE HYDROCHLORIDE 100 ML/HR: 100 INJECTION, SOLUTION INTRAMUSCULAR; INTRAVENOUS at 09:00

## 2023-04-17 RX ADMIN — FERROUS SULFATE TAB 325 MG (65 MG ELEMENTAL FE) 325 MG: 325 (65 FE) TAB at 09:02

## 2023-04-17 RX ADMIN — SACUBITRIL AND VALSARTAN 1 TABLET: 24; 26 TABLET, FILM COATED ORAL at 08:59

## 2023-04-17 RX ADMIN — ACETAMINOPHEN 325MG 650 MG: 325 TABLET ORAL at 09:00

## 2023-04-17 NOTE — DISCHARGE SUMMARY
[facility] Medicine Services  DISCHARGE SUMMARY    Patient Name: Yared Mckeon  : 1950  MRN: [id number]    Date of Admission: 4/15/2023  7:19 PM  Date of Discharge:  23  Primary Care Physician: Tiera Tan APRN    Consults     Date and Time Order Name Status Description    2023 12:36 AM Inpatient Cardiology Consult Completed     4/15/2023  7:20 PM Inpatient Neurology Consult Stroke            Hospital Course     Presenting Problem:   Unresponsive episode [R41.89]  Syncope and collapse [R55]    Active Hospital Problems    Diagnosis  POA   • Tobacco abuse [Z72.0]  Yes   • HTN (hypertension) [I10]  Yes   • GERD without esophagitis [K21.9]  Yes   • Pulmonary nodule [R91.1]  Yes   • Alcohol use [Z78.9]  Yes   • HLD (hyperlipidemia) [E78.5]  Yes   • Chronic systolic congestive heart failure (HCC) [I50.22]  Yes   • A-fib (HCC) [I48.91]  Yes      Resolved Hospital Problems    Diagnosis Date Resolved POA   • **Unresponsive episode [R41.89] 2023 Yes   • Syncope and collapse [R55] 2023 Yes   • Lactic acidosis [E87.20] 2023 Yes   • Shoulder pain, left [M25.512] 2023 Yes   • Chest pain [R07.9] 2023 Yes          Hospital Course:  Yared Mckeon is a 72 y.o. male with hx of chronic systolic and diastolic CHF, HTN, HLD, PAF on Eliquis, tachy-rancho syndrome, tobacco abuse, and alcohol abuse who presents due to an episode of unresponsiveness. CT head, CT perfusion, CTA head/neck negative. EEG negative. Neurology saw the patient and did not feel he had an acute neurologic event. Patient had reported chest pain prior to the event as well as palpitations x several weeks. CTA chest negative for PE. EKG was non-ischemic, troponins were flat. Ischemic evaluation deferred. Cardiology felt he likely had PAF that led to syncope. They have evaluated his medications. Echo was fairly unremarkable. He has an appointment with his cardiologist on 23. Nurse  reported possible black tarry stool today however FOBT negative and Hb is stable. Could be secondary to his PO iron. Patient will be encouraged to monitor this at home and follow up closely with his PCP. Stable for discharge home today.    CTA chest did show a small RLL pulmonary nodule, follow up in 1 year is recommended. Will make ambulatory referral to Lung Nodule clinic at discharge. ADDENDUM 14:34 EDT: Patient is already followed by Pulmonology/Laurence CERVANTES and Dr. Steven sOuna, and just had a CT chest done 3/28/23 to follow a cavitary lung lesion; a previous PET scan showed no hypermetabolic activity. Will advise patient to follow up in their office in the next 3 months to follow up on the RLL nodule seen on CT scan this admission.    Discharge Follow Up Recommendations for outpatient labs/diagnostics:  F/U with PCP in 1 week  F/U with cardiologist as scheduled    Day of Discharge     HPI:   Patient seen this morning, no complaints.     Review of Systems  Gen-no fevers, no chills  CV-no chest pain, no palpitations  Resp-no cough, no dyspnea  GI-no N/V/D, no abd pain      Vital Signs:   Temp:  [97.9 °F (36.6 °C)-98.5 °F (36.9 °C)] 98.5 °F (36.9 °C)  Heart Rate:  [] 57  Resp:  [16-20] 18  BP: (143-160)/(70-78) 146/70      Physical Exam:  Gen-no acute distress  HENT-NCAT, mucous membranes moist  CV-RRR, S1 S2 normal, no m/r/g  Resp-CTAB, no wheezes or rales  Abd-soft, NT, ND, +BS  Ext-no edema  Neuro-A&Ox3, no focal deficits  Skin-no rashes  Psych-appropriate mood      Pertinent  and/or Most Recent Results     LAB RESULTS:      Lab 04/17/23  1140 04/16/23  1142 04/16/23  0456 04/16/23  0220 04/15/23  2330 04/15/23  2152 04/15/23  1933 04/15/23  1932 04/15/23  1931   WBC 6.68  --   --  5.91  --   --   --   --  6.78   HEMOGLOBIN 13.1  --   --  14.2  --   --   --   --  13.9   HEMOGLOBIN, POC  --   --   --   --   --   --  14.6  --   --    HEMATOCRIT 38.4  --   --  41.7  --   --   --   --  40.3    HEMATOCRIT POC  --   --   --   --   --   --  43  --   --    PLATELETS 166  --   --  181  --   --   --   --  210   NEUTROS ABS  --   --   --  3.52  --   --   --   --  4.10   IMMATURE GRANS (ABS)  --   --   --  0.02  --   --   --   --  0.02   LYMPHS ABS  --   --   --  1.47  --   --   --   --  1.86   MONOS ABS  --   --   --  0.73  --   --   --   --  0.68   EOS ABS  --   --   --  0.10  --   --   --   --  0.06   MCV 95.0  --   --  96.5  --   --   --   --  96.2   PROCALCITONIN  --   --   --   --  0.04  --   --   --   --    LACTATE  --  1.4 2.8* 2.8* 2.8* 3.1*  --   --   --    PROTIME  --   --   --  12.6  --   --   --  15.1  --    APTT  --   --   --   --   --   --   --   --  32.7   D DIMER QUANT  --   --   --   --   --   --   --   --  1.03*         Lab 04/16/23  0220 04/15/23  1933 04/15/23  1931   SODIUM 139  --  140   POTASSIUM 3.9  --  3.6   CHLORIDE 100  --  100   CO2 23.0  --  21.0*   ANION GAP 16.0*  --  19.0*   BUN 13  --  13   CREATININE 0.61* 1.00 0.84   EGFR 102.1 80.0 92.7   GLUCOSE 82  --  128*   CALCIUM 8.9  --  8.8   MAGNESIUM 1.8  --   --    HEMOGLOBIN A1C 5.30  --   --    TSH 3.070  --   --          Lab 04/15/23  1931   TOTAL PROTEIN 6.3   ALBUMIN 3.9   GLOBULIN 2.4   ALT (SGPT) 12  13   AST (SGOT) 22  24   BILIRUBIN 1.3*   ALK PHOS 63         Lab 04/16/23  0456 04/16/23  0220 04/15/23  2330 04/15/23  1932 04/15/23  1931   PROBNP  --   --  42.6  --   --    HSTROP T 14 11 12  --  16*   PROTIME  --  12.6  --  15.1  --    INR  --  0.95  --  1.3*  --          Lab 04/16/23  0220   CHOLESTEROL 169   LDL CHOL 79   HDL CHOL 78*   TRIGLYCERIDES 63         Lab 04/15/23  1931   VITAMIN B 12 335         Brief Urine Lab Results  (Last result in the past 365 days)      Color   Clarity   Blood   Leuk Est   Nitrite   Protein   CREAT   Urine HCG        04/16/23 0709 Yellow   Clear   Negative   Negative   Negative   Trace               Microbiology Results (last 10 days)     Procedure Component Value - Date/Time     Respiratory Panel PCR w/COVID-19(SARS-CoV-2) KIRSTEN/DAVIDA/RADHA/PAD/COR/MAD/RICKI In-House, NP Swab in UTM/VTM, 3-4 HR TAT - Swab, Nasopharynx [[id number]]  (Normal) Collected: 04/16/23 0154    Lab Status: Final result Specimen: Swab from Nasopharynx Updated: 04/16/23 0245     ADENOVIRUS, PCR Not Detected     Coronavirus 229E Not Detected     Coronavirus HKU1 Not Detected     Coronavirus NL63 Not Detected     Coronavirus OC43 Not Detected     COVID19 Not Detected     Human Metapneumovirus Not Detected     Human Rhinovirus/Enterovirus Not Detected     Influenza A PCR Not Detected     Influenza B PCR Not Detected     Parainfluenza Virus 1 Not Detected     Parainfluenza Virus 2 Not Detected     Parainfluenza Virus 3 Not Detected     Parainfluenza Virus 4 Not Detected     RSV, PCR Not Detected     Bordetella pertussis pcr Not Detected     Bordetella parapertussis PCR Not Detected     Chlamydophila pneumoniae PCR Not Detected     Mycoplasma pneumo by PCR Not Detected    Narrative:      In the setting of a positive respiratory panel with a viral infection PLUS a negative procalcitonin without other underlying concern for bacterial infection, consider observing off antibiotics or discontinuation of antibiotics and continue supportive care. If the respiratory panel is positive for atypical bacterial infection (Bordetella pertussis, Chlamydophila pneumoniae, or Mycoplasma pneumoniae), consider antibiotic de-escalation to target atypical bacterial infection.          Adult Transthoracic Echo Complete W/ Cont if Necessary Per Protocol (With Agitated Saline)    Result Date: 4/17/2023  •  Left ventricular systolic function is normal. Left ventricular ejection fraction appears to be 61 - 65%. •  Normal left atrial size and volume noted •  No evidence of a patent foramen ovale. Saline test results are negative for right to left atrial level shunt. •  Mild aortic valve regurgitation is present. •  Mild to moderate mitral valve regurgitation  is present.     EEG    Result Date: 4/17/2023  Reason for referral: 72 y.o.male with syncope Technical Summary:  A 19 channel digital EEG was performed using the international 10-20 placement system, including eye leads and EKG leads. Duration: 20 minutes Findings: The awake tracing shows a well-regulated medium amplitude 9 Hz posterior rhythm present symmetrically over the occipital and posterior parietal leads.  Low to medium amplitude intermixed theta and alpha activity are seen anteriorly, along with EMG and eye blink artifact.  Sleep is not seen.  No focal features or epileptiform activity are present.  Hyperventilation and photic stimulation are not performed. Video: Available Technical quality: Superior EKG: Regular, 50-60 bpm SUMMARY: Normal EEG in the awake state No focal features or epileptiform activity are seen     Normal study This report is transcribed using the Dragon dictation system.      CT Abdomen Pelvis Without Contrast    Result Date: 4/16/2023  EXAMINATION: CT ABDOMEN AND PELVIS WITHOUT IV CONTRAST   DATE OF EXAMINATION: 4/16/2023. COMPARISON: 9/20/2021. INDICATION: Generalized abdominal tenderness. PROCEDURE:   Axial CT of the abdomen and pelvis was performed with sagittal and coronal reformatted images without contrast enhancement. CT dose lowering techniques were used, to include: automated exposure control, adjustment for patient size, and/or use of iterative reconstruction. The exam is limited because some types of pathology may not be adequately demonstrated due to lack of contrast enhancement. FINDINGS: LOWER CHEST :  The visualized lung bases are clear.  There are no pleural or pericardial effusions. ABDOMEN: Liver and Biliary system:  Normal. Adrenal glands:  Normal. Kidneys and ureters:  There is a 1.8 cm cyst in the upper pole the left kidney. There is residual contrast seen within the renal collecting systems, ureters and bladder from the recent CTA of the chest. Spleen:  Normal.  Pancreas:  Normal. Gallbladder:  Normal. Lymph nodes, Peritoneum and mesentery:  There is no mesenteric or retroperitoneal lymphadenopathy. Gastrointestinal tract:  There are no dilated loops of bowel or free intraperitoneal air.  . The appendix is normal. There is mild descending colonic and sigmoid colonic diverticulosis without evidence of diverticulitis. Aorta/IVC:   There is moderate vascular calcification throughout the abdominal aorta without evidence of aneurysmal dilation.  IVC normal. Abdominal wall:  Normal. PELVIS: Fluid: There is no free fluid in the pelvis. Lymph Nodes:  There is no pelvic or inguinal lymphadenopathy.. Urinary bladder:  Normal. BONES:  There are multilevel degenerative disc changes seen throughout the spine. No aggressive osseous lesions are present. ADDITIONAL  SIGNIFICANT FINDINGS:  None.     1. Residual contrast within the renal collecting systems, ureters and bladder from a recent contrast enhanced CT of the chest. No gross hydronephrosis is seen. 2. Diverticulosis without evidence of diverticulitis. 3. No bowel obstruction or appendicitis. Electronically signed by:  Randy Rhodes D.O.  4/16/2023 12:39 AM Mountain Time    XR Shoulder 2+ View Left    Result Date: 4/16/2023  EXAMINATION: XR SHOULDER 2+ VW LEFT  DATE OF EXAM: 4/16/2023 1:44 AM HISTORY: Recent fall with injury, pain, decreased range of motion COMPARISON: None. FINDINGS: Left shoulder three views: No acute osseous abnormality. Moderate acromioclavicular and minor glenohumeral osteoarthritis. No radiopaque foreign body.     1.  No acute osseous abnormality. 2.  Moderate acromioclavicular and minor glenohumeral osteoarthritis. Electronically signed by:  Niraj Barry M.D.  4/16/2023 12:26 AM Mountain Time    CT Head Without Contrast    Result Date: 4/16/2023  CT HEAD WO CONTRAST Date of Exam: 4/16/2023 7:34 AM EDT Indication: Stroke, follow up. Comparison: 4/15/2023 Technique: Axial CT images were obtained of the head  without contrast administration.  Reconstructed coronal and sagittal images were also obtained. Automated exposure control and iterative construction methods were used. Findings: No intra or extra-axial fluid collections, masses, or areas of hemorrhage. No midline shift or mass effect is identified. Ventricles and sulci are age-appropriate. Orbits paranasal sinuses and mastoid air cells are unremarkable. There are postsurgical changes along the left frontal sinus.     Impression: 1. Age-appropriate atrophy. No acute intracranial pathology. Electronically Signed: Chang Anderson  4/16/2023 7:45 AM EDT  Workstation ID: [id number]    CT Angiogram Neck    Result Date: 4/15/2023  CT ANGIOGRAM NECK, CT ANGIOGRAM HEAD W AI ANALYSIS OF LVO Date of Exam: 4/15/2023 7:23 PM EDT Indication: Neuro deficit, acute stroke suspected. Comparison: CT head 4/15/2023 Technique: CTA of the head and neck was performed before and after the uneventful intravenous administration of 115 cc Isovue-370 . Reconstructed coronal and sagittal images were also obtained. In addition, a 3-D volume rendered image was created for interpretation. Automated exposure control and iterative reconstruction methods were used. Findings: CTA neck: Aortic arch and origins of the great vessels are within normal limits. There is minimal calcified plaque at origins of the great vessels but without significant stenosis. Right brachiocephalic and right subclavian artery are patent without focal stenosis. Right common, internal, and external carotid arteries are patent without focal stenosis. Left common, internal, and external carotid arteries are patent without focal stenosis. Left subclavian artery is patent without focal stenosis. Left vertebral artery is dominant. Vertebral arteries are patent without focal stenosis or occlusion. The soft tissues of the neck appear within normal limits. There is parenchymal scarring within the right lung apex. Visualized lung apices  are otherwise clear. No lytic or sclerotic bony lesions are identified. There are degenerative changes throughout spine. CTA HEAD: Intracranial internal carotid arteries are patent without focal stenosis. Bilateral anterior cerebral arteries are patent without focal stenosis. Bilateral middle cerebral arteries are patent without focal stenosis or occlusion. No anterior circulation aneurysm identified. Vertebral arteries are patent at skull base. Basilar artery is patent without focal stenosis. Bilateral posterior cerebral arteries and superior cerebellar arteries are patent without focal stenosis. No posterior circulation aneurysm identified. No pathologic intracranial contrast enhancement. Globes and orbits appear within normal limits. Visualized paranasal sinuses and mastoid air cells are clear. There are multiple radiopaque foreign bodies within the scalp overlying the left frontal bone.      Impression: 1. No evidence of carotid or vertebral artery stenosis. 2. No intracranial vascular stenosis, occlusion, or aneurysm. 3. No pathologic intracranial contrast enhancement. Electronically Signed: Chang Rizo  4/15/2023 8:54 PM EDT  Workstation ID: [id number]    XR Chest 1 View    Result Date: 4/15/2023  XR CHEST 1 VW Date of Exam: 4/15/2023 7:50 PM EDT Indication: Acute Stroke Protocol (onset < 12 hrs). Comparison: 5/17/2022 FINDINGS: No new consolidations or pleural effusions are observed. The cardiac silhouette and mediastinum are stable. No acute osseous abnormalities are identified.     There is no significant change when compared to the prior study. There is no evidence for acute cardiopulmonary process. Electronically Signed: Wero Burk  4/15/2023 8:30 PM EDT  Workstation ID: [id number]    CT Angiogram Chest Pulmonary Embolism    Result Date: 4/15/2023  Exam: CT Angiography of the Chest. Date: 4/15/2023. Comparison: None History: Shortness of breath with elevated d-dimer. Technique: CT examination of the  chest was performed following the intravenous administration of 80 mL of Isovue-370. Sagittal, coronal and 3-D reformatted images were provided. CT dose lowering techniques were used, to include: automated exposure control, adjustment for patient size, and/or use of iterative reconstruction. FINDINGS: Mediastinum and Smiley: There is no axillary, mediastinal or hilar lymphadenopathy. Pleural and Pericardial spaces: There are no pleural or pericardial effusions. Upper Abdomen: Partially included a small cyst is seen within the upper pole the left kidney. The visualized upper abdomen otherwise appears unremarkable. Cardiovascular: There is moderate vascular calcification throughout the thoracic aorta without evidence of aneurysmal dilation or dissection. Pulmonary Artery: There are no filling defects in the pulmonary arteries. Lung Parenchyma and Airways: There is linear bands of opacity in the right lung apex which likely relates to scarring. There is mild upper lobe predominant centrilobular emphysema. There is a 2 mm nodule in the right lower lobe on series 5 image 59. Bones: No fracture or aggressive osseous lesion.     1. No evidence of pulmonary embolism. 2. No evidence of thoracic aortic aneurysm or dissection. 3. No evidence of pneumonia, pleural or pericardial effusions. 4. Small right lower lobe pulmonary nodule. Follow-up in one year is recommended. 4. Mild emphysema. Electronically signed by:  Randy Rhodes D.O.  4/15/2023 9:01 PM Mountain Time    CT Head Without Contrast Stroke Protocol    Result Date: 4/15/2023  CT HEAD WO CONTRAST STROKE PROTOCOL Date of Exam: 4/15/2023 7:20 PM EDT Indication: Neuro deficit, acute, stroke suspected Neuro deficit, acute stroke suspected. Comparison: 11/4/2021 Technique: Axial CT images were obtained of the head without contrast administration.  Reconstructed coronal and sagittal images were also obtained. Automated exposure control and iterative construction methods were  used. Scan Time:  7:19 PM Results discussed with the stroke team at 7:27 PM. Findings: Parenchyma:No acute intraparenchymal hemorrhage. No loss of gray-white differentiation to suggest large territory infarct. Mild parenchymal volume loss. Scattered periventricular and subcortical white matter hypodensities, nonspecific, but most often consistent with small vessel ischemic changes. No midline shift or herniation. Ventricles and extra axial spaces:Prominent ventricles and sulci secondary to volume loss. No extra axial fluid collection seen. Other:Lens replacements. Scattered paranasal sinus mucosal thickening. Mastoid air cells are clear. Calvarium is intact. Intracranial atherosclerotic calcification is present. Multiple metallic fragments seen within the left frontal scalp.     Impression: No evidence of acute intracranial hemorrhage or large territorial infarct. Electronically Signed: Gentry Rivera  4/15/2023 7:32 PM EDT  Workstation ID: [id number]    CT Angiogram Head w AI Analysis of LVO    Result Date: 4/15/2023  CT ANGIOGRAM NECK, CT ANGIOGRAM HEAD W AI ANALYSIS OF LVO Date of Exam: 4/15/2023 7:23 PM EDT Indication: Neuro deficit, acute stroke suspected. Comparison: CT head 4/15/2023 Technique: CTA of the head and neck was performed before and after the uneventful intravenous administration of 115 cc Isovue-370 . Reconstructed coronal and sagittal images were also obtained. In addition, a 3-D volume rendered image was created for interpretation. Automated exposure control and iterative reconstruction methods were used. Findings: CTA neck: Aortic arch and origins of the great vessels are within normal limits. There is minimal calcified plaque at origins of the great vessels but without significant stenosis. Right brachiocephalic and right subclavian artery are patent without focal stenosis. Right common, internal, and external carotid arteries are patent without focal stenosis. Left common, internal, and  external carotid arteries are patent without focal stenosis. Left subclavian artery is patent without focal stenosis. Left vertebral artery is dominant. Vertebral arteries are patent without focal stenosis or occlusion. The soft tissues of the neck appear within normal limits. There is parenchymal scarring within the right lung apex. Visualized lung apices are otherwise clear. No lytic or sclerotic bony lesions are identified. There are degenerative changes throughout spine. CTA HEAD: Intracranial internal carotid arteries are patent without focal stenosis. Bilateral anterior cerebral arteries are patent without focal stenosis. Bilateral middle cerebral arteries are patent without focal stenosis or occlusion. No anterior circulation aneurysm identified. Vertebral arteries are patent at skull base. Basilar artery is patent without focal stenosis. Bilateral posterior cerebral arteries and superior cerebellar arteries are patent without focal stenosis. No posterior circulation aneurysm identified. No pathologic intracranial contrast enhancement. Globes and orbits appear within normal limits. Visualized paranasal sinuses and mastoid air cells are clear. There are multiple radiopaque foreign bodies within the scalp overlying the left frontal bone.      Impression: 1. No evidence of carotid or vertebral artery stenosis. 2. No intracranial vascular stenosis, occlusion, or aneurysm. 3. No pathologic intracranial contrast enhancement. Electronically Signed: Chang Rizo  4/15/2023 8:54 PM EDT  Workstation ID: [id number]    CT CEREBRAL PERFUSION WITH & WITHOUT CONTRAST    Result Date: 4/15/2023  CT CEREBRAL PERFUSION W WO CONTRAST Date of Exam: 4/15/2023 7:23 PM EDT Indication: Neuro deficit, acute stroke suspected.  Comparison: None available. Technique: Axial CT images of the brain were obtained prior to and after the administration of 115 mL Isovue-370. Core blood volume, core blood flow, mean transit time, and Tmax images  were obtained utilizing the Rapid software protocol. A limited CT angiogram of the head was also performed to measure the blood vessel density. The radiation dose reduction device was turned on for each scan per the ALARA (As Low as Reasonably Achievable) protocol. Findings: Symmetric and preserved CBV and CBF. No evidence of substantially elevated Tmax. Mildly elevated T. Max in the right watershed regions suggestive of mild border zone hypoperfusion.     Impression: No CT perfusion evidence of infarct or ischemia. Electronically Signed: Gentry ConwayMiguel  4/15/2023 7:48 PM EDT  Workstation ID: [id number]              Results for orders placed during the hospital encounter of 04/15/23    Adult Transthoracic Echo Complete W/ Cont if Necessary Per Protocol (With Agitated Saline)    Interpretation Summary  •  Left ventricular systolic function is normal. Left ventricular ejection fraction appears to be 61 - 65%.  •  Normal left atrial size and volume noted  •  No evidence of a patent foramen ovale. Saline test results are negative for right to left atrial level shunt.  •  Mild aortic valve regurgitation is present.  •  Mild to moderate mitral valve regurgitation is present.        Pending Labs     Order Current Status    Vitamin B1, Whole Blood In process        Discharge Details        Discharge Medications      New Medications      Instructions Start Date   aspirin 81 MG chewable tablet   81 mg, Oral, Daily      atorvastatin 80 MG tablet  Commonly known as: LIPITOR   80 mg, Oral, Nightly         Changes to Medications      Instructions Start Date   furosemide 20 MG tablet  Commonly known as: Lasix  What changed:   · when to take this  · reasons to take this  · additional instructions   20 mg, Oral, 2 Times Daily PRN, ONLY TAKE IF noticeably swelling or gaining 2 lbs in <24 hours         Continue These Medications      Instructions Start Date   albuterol sulfate  (90 Base) MCG/ACT inhaler  Commonly known as:  PROVENTIL HFA;VENTOLIN HFA;PROAIR HFA   2 puffs, Inhalation, Every 4 Hours PRN      Breztri Aerosphere 160-9-4.8 MCG/ACT aerosol inhaler  Generic drug: Budeson-Glycopyrrol-Formoterol   2 puffs, Inhalation, 2 Times Daily      Eliquis 5 MG tablet tablet  Generic drug: apixaban   Take 1 tablet by mouth Every 12 (Twelve) Hours.      Entresto 24-26 MG tablet  Generic drug: sacubitril-valsartan   1 tablet, Oral, 2 Times Daily      ferrous gluconate 324 MG tablet  Commonly known as: FERGON   1 tablet, Oral, Every Night at Bedtime      methocarbamol 750 MG tablet  Commonly known as: ROBAXIN   750 mg, Oral, 4 Times Daily PRN      metoprolol succinate XL 25 MG 24 hr tablet  Commonly known as: TOPROL-XL   12.5 mg, Oral, Daily      pantoprazole 40 MG EC tablet  Commonly known as: PROTONIX   40 mg, Oral, Daily      spironolactone 25 MG tablet  Commonly known as: ALDACTONE   Take 1/2 tablet by mouth Daily.             No Known Allergies      Discharge Disposition:  Home or Self Care    Diet:  Hospital:  Diet Order   Procedures   • Diet: Cardiac Diets; Healthy Heart (2-3 Na+); Texture: Regular Texture (IDDSI 7); Fluid Consistency: Thin (IDDSI 0)       Activity:  Activity Instructions     Activity as Tolerated                 CODE STATUS:    Code Status and Medical Interventions:   Ordered at: 04/16/23 0036     Code Status (Patient has no pulse and is not breathing):    CPR (Attempt to Resuscitate)     Medical Interventions (Patient has pulse or is breathing):    Full Support       Future Appointments   Date Time Provider Department Center   5/4/2023 11:00 AM Licha Valdes APRN [facility]       Additional Instructions for the Follow-ups that You Need to Schedule     Ambulatory Referral to Physical Therapy Evaluate and treat   As directed      Specialty needed: Evaluate and treat    Follow-up needed: Yes         Discharge Follow-up with PCP   As directed       Currently Documented PCP:    Tiera Tan APRN    PCP  Phone Number:    [phone]     Follow Up Details: 1 week                     Senia Moe MD  04/17/23      Time Spent on Discharge:  I spent  20 minutes on this discharge activity which included: face-to-face encounter with the patient, reviewing the data in the system, coordination of the care with the nursing staff as well as consultants, documentation, and entering orders.

## 2023-04-17 NOTE — PROGRESS NOTES
"  [facility]  PROGRESS NOTE    Date of Admission: 4/15/2023  Date of Service: 04/17/23    Primary Care Physician: Tiera Tan APRN    Chief Complaint: loss of consciousness      Subjective      HPI: Patient reports no recurrence of presyncopal symptoms or chest pain, shortness of rbeath or palpitations. He has been walking in the halls without lightheadedness or other complaints.       Objective   Vitals: /73 (BP Location: Right arm, Patient Position: Sitting)   Pulse 56   Temp 98.3 °F (36.8 °C) (Oral)   Resp 20   Ht 170.2 cm (67\")   Wt 69.1 kg (152 lb 6.4 oz)   SpO2 98%   BMI 23.87 kg/m²     Physical Exam:   GENERAL: Alert, cooperative, in no acute distress.   HEART: Regular rate and rhythm; no murmurs, rubs or gallops  LUNGS: Clear to auscultation bilaterally. No wheezing, rales or rhonchi. Nonlabored breathing  NEUROLOGIC: No gross focal abnormalities  EXTREMITIES: No obvious deformities, cyanosis, or edema noted.   PSYCH: normal mood, behavior    Results:  Results from last 7 days   Lab Units 04/16/23  0220 04/15/23  1933 04/15/23  1931   WBC 10*3/mm3 5.91  --  6.78   HEMOGLOBIN g/dL 14.2  --  13.9   HEMOGLOBIN, POC g/dL  --  14.6  --    HEMATOCRIT % 41.7  --  40.3   HEMATOCRIT POC %  --  43  --    PLATELETS 10*3/mm3 181  --  210     Results from last 7 days   Lab Units 04/16/23  0220 04/15/23  1933 04/15/23  1931   SODIUM mmol/L 139  --  140   POTASSIUM mmol/L 3.9  --  3.6   CHLORIDE mmol/L 100  --  100   CO2 mmol/L 23.0  --  21.0*   BUN mg/dL 13  --  13   CREATININE mg/dL 0.61* 1.00 0.84   GLUCOSE mg/dL 82  --  128*      Lab Results   Component Value Date    CHOL 169 04/16/2023    TRIG 63 04/16/2023    HDL 78 (H) 04/16/2023    LDL 79 04/16/2023    AST 24 04/15/2023    AST 22 04/15/2023    ALT 13 04/15/2023    ALT 12 04/15/2023     Results from last 7 days   Lab Units 04/16/23  0220   HEMOGLOBIN A1C % 5.30     Results from last 7 days   Lab Units " 04/16/23  0220   CHOLESTEROL mg/dL 169   TRIGLYCERIDES mg/dL 63   HDL CHOL mg/dL 78*   LDL CHOL mg/dL 79     Results from last 7 days   Lab Units 04/16/23  0220   TSH uIU/mL 3.070         Results from last 7 days   Lab Units 04/16/23 0220 04/15/23  1932 04/15/23  1931   PROTIME Seconds 12.6 15.1  --    INR  0.95 1.3*  --    APTT seconds  --   --  32.7     Results from last 7 days   Lab Units 04/16/23  0456 04/16/23  0220 04/15/23  2330   HSTROP T ng/L 14 11 12     Results from last 7 days   Lab Units 04/15/23  2330   PROBNP pg/mL 42.6         Intake/Output Summary (Last 24 hours) at 4/17/2023 0816  Last data filed at 4/17/2023 0500  Gross per 24 hour   Intake 1000 ml   Output --   Net 1000 ml       Tele: sinus bradycardia    Radiology Data: no new data    Current Medications:  apixaban, 5 mg, Oral, Q12H  aspirin, 81 mg, Oral, Daily   Or  aspirin, 300 mg, Rectal, Daily  atorvastatin, 80 mg, Oral, Nightly  ferrous sulfate, 325 mg, Oral, Daily With Breakfast  metoprolol succinate XL, 12.5 mg, Oral, Daily  nicotine, 1 patch, Transdermal, Nightly  pantoprazole, 40 mg, Oral, Daily  IV Fluids 1000 mL + additives, 100 mL/hr, Intravenous, Daily           Assessment  Active Hospital Problems    Diagnosis  POA   • **Unresponsive episode [R41.89]  Yes   • Lactic acidosis [E87.20]  Yes   • Tobacco abuse [Z72.0]  Yes   • Shoulder pain, left [M25.512]  Yes   • HTN (hypertension) [I10]  Yes   • GERD without esophagitis [K21.9]  Yes   • Pulmonary nodule [R91.1]  Yes   • Alcohol use [Z78.9]  Yes   • Chest pain [R07.9]  Yes   • HLD (hyperlipidemia) [E78.5]  Yes   • Chronic systolic congestive heart failure (HCC) [I50.22]  Yes     · 7/24/2020 TTE: LVEF 36 to 40%  · 9/30/2022 TTE: LVEF 56 to 60%, grade 1 diastolic dysfunction     • A-fib (HCC) [I48.91]  Yes         Plan  · Paroxysmal Atrial Fibrillation/Syncope in setting of palpitations x several weeks- Resume Eliquis.  · ACS ruled out with flat troponin, nonischemic EKG, no prolonged  QT. Defer ischemic evaluation.  · Echo pending. Patient acceptable for discharge if no drastic change to echocardiogram.  · Reinitiate home Entresto this morning. Can reinitiate home Aldactone after discharge. Can switch back to metoprolol succinate at discharge. Hold scheduled Lasix at home unless noticeably swelling or gaining 2 lbs in <24 hours. Cardiac D/c medrec complete. 7d Epatch at discharge.  · Patient has appointment with his cardiologist in early May.     Bigg Ospina PA-C

## 2023-04-17 NOTE — DISCHARGE PLACEMENT REQUEST
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 control/reduction;functional balance retraining;occupation/activity based interventions;patient/caregiver education/training;transfer/mobility retraining;strengthening exercise;ROM/therapeutic exercise  -AN           User Key  (r) = Recorded By, (t) = Taken By, (c) = Cosigned By    Initials Name Provider Type    Armida Garza OT Occupational Therapist               Clinical Impression     Row Name 04/16/23 1539          Pain Assessment    Pretreatment Pain Rating 0/10 - no pain  -AN     Posttreatment Pain Rating 0/10 - no pain  -AN     Pre/Posttreatment Pain Comment asymptomatic  -AN     Pain Intervention(s) Repositioned;Ambulation/increased activity  -AN     Row Name 04/16/23 1539          Plan of Care Review    Plan of Care Reviewed With patient;family  -AN     Progress no change  -AN     Outcome Evaluation Pt presents below his functional baseline with deficits including generalized weakness, L shoulder dislocation with reduction,  impaired balance, and decreased activity tolerance warranting skilled OT services. Issued sling for LUE and educated on wearing with mobility for support and how to don/doff. No ortho consult at this time. Rec home with 24/7 assist and OP therapy services at [place].  -AN     Row Name 04/16/23 1539          Therapy Assessment/Plan (OT)    Patient/Family Therapy Goal Statement (OT) Return to PLOF  -AN     Rehab Potential (OT) good, to achieve stated therapy goals  -AN     Criteria for Skilled Therapeutic Interventions Met (OT) yes;skilled treatment is necessary  -AN     Therapy Frequency (OT) daily  -AN     Row Name 04/16/23 1539          Therapy Plan Review/Discharge Plan (OT)    Anticipated Discharge Disposition (OT) home with 24/7 care;home with outpatient therapy services  -AN     Row Name 04/16/23 1539          Vital Signs    Pre Systolic BP Rehab --  VSS  -AN     O2 Delivery Pre Treatment room air  -AN     O2 Delivery Intra Treatment room air  -AN     O2 Delivery Post Treatment room air  -AN     Pre Patient Position Sitting  -AN     Intra Patient Position Standing  -AN     Post Patient Position Sitting  -AN     Row Name 04/16/23 1539          Positioning and Restraints    Pre-Treatment Position sitting in chair/recliner  -AN     Post Treatment Position chair  -AN     In Chair notified nsg;sitting;call light within reach;encouraged to call for assist;exit alarm on;with family/caregiver;with brace  -AN           User Key  (r) = Recorded By, (t) = Taken By, (c) = Cosigned By    Initials Name Provider Type    Armida Garza, OT Occupational Therapist               Outcome Measures     Row Name 04/16/23 1544          How much help from another is currently needed...    Putting on and taking off regular lower body clothing? 3  -AN     Bathing (including washing, rinsing, and drying) 3  -AN     Toileting (which includes using toilet bed pan or urinal) 3  -AN     Putting on and taking off regular upper body clothing 3   -AN     Taking care of personal grooming (such as brushing teeth) 3  -AN     Eating meals 3  -AN     AM-PAC 6 Clicks Score (OT) 18  -AN     Row Name 04/16/23 1012 04/16/23 0800       How much help from another person do you currently need...    Turning from your back to your side while in flat bed without using bedrails? 4  -KR 4  -HS    Moving from lying on back to sitting on the side of a flat bed without bedrails? 4  -KR 4  -HS    Moving to and from a bed to a chair (including a wheelchair)? 3  -KR 3  -HS    Standing up from a chair using your arms (e.g., wheelchair, bedside chair)? 3  -KR 3  -HS    Climbing 3-5 steps with a railing? 3  -KR 3  -HS    To walk in hospital room? 3  -KR 3  -HS    AM-PAC 6 Clicks Score (PT) 20  -KR 20  -HS    Highest level of mobility 6 --> Walked 10 steps or more  -KR 6 --> Walked 10 steps or more  -HS    Row Name 04/16/23 1544 04/16/23 1012       Modified [place] Scale    Pre-Stroke Modified [place] Scale 6 - Unable to determine (UTD) from the medical record documentation  -AN 6 - Unable to determine (UTD) from the medical record documentation  -KR    Modified [place] Scale 2 - Slight disability.  Unable to carry out all previous activities but able to look after own affairs without assistance.  -AN 2 - Slight disability.  Unable to carry out all previous activities but able to look after own affairs without assistance.  -KR    Row Name 04/16/23 1544 04/16/23 1012       Functional Assessment    Outcome Measure Options AM-PAC 6 Clicks Daily Activity (OT);Modified [place]  -AN AM-PAC 6 Clicks Basic Mobility (PT);Modified [place]  -KR          User Key  (r) = Recorded By, (t) = Taken By, (c) = Cosigned By    Initials Name Provider Type    Allie Bradley RN Registered Nurse    Armida Garza OT Occupational Therapist    Christal Garcia, PT Physical Therapist                Occupational Therapy Education     Title: PT OT SLP Therapies (In Progress)     Topic: Occupational  Therapy (Done)     Point: ADL training (Done)     Description:   Instruct learner(s) on proper safety adaptation and remediation techniques during self care or transfers.   Instruct in proper use of assistive devices.              Learning Progress Summary           Patient Acceptance, E, VU by AN at 4/16/2023 1545   Family Acceptance, E, VU by AN at 4/16/2023 1545                   Point: Home exercise program (Done)     Description:   Instruct learner(s) on appropriate technique for monitoring, assisting and/or progressing therapeutic exercises/activities.              Learning Progress Summary           Patient Acceptance, E, VU by AN at 4/16/2023 1545   Family Acceptance, E, VU by AN at 4/16/2023 1545                   Point: Precautions (Done)     Description:   Instruct learner(s) on prescribed precautions during self-care and functional transfers.              Learning Progress Summary           Patient Acceptance, E, VU by AN at 4/16/2023 1545   Family Acceptance, E, VU by AN at 4/16/2023 1545                   Point: Body mechanics (Done)     Description:   Instruct learner(s) on proper positioning and spine alignment during self-care, functional mobility activities and/or exercises.              Learning Progress Summary           Patient Acceptance, E, VU by AN at 4/16/2023 1545   Family Acceptance, E, VU by AN at 4/16/2023 1545                               User Key     Initials Effective Dates Name Provider Type Discipline     09/21/21 -  Armida Corona OT Occupational Therapist OT              OT Recommendation and Plan  Planned Therapy Interventions (OT): activity tolerance training, adaptive equipment training, BADL retraining, edema control/reduction, functional balance retraining, occupation/activity based interventions, patient/caregiver education/training, transfer/mobility retraining, strengthening exercise, ROM/therapeutic exercise  Therapy Frequency (OT): daily  Plan of Care Review  Plan  of Care Reviewed With: patient, family  Progress: no change  Outcome Evaluation: Pt presents below his functional baseline with deficits including generalized weakness, L shoulder dislocation with reduction, impaired balance, and decreased activity tolerance warranting skilled OT services. Issued sling for LUE and educated on wearing with mobility for support and how to don/doff. No ortho consult at this time. Rec home with 24/7 assist and OP therapy services at [place].     Time Calculation:    Time Calculation- OT     Row Name 04/16/23 1548             Time Calculation- OT    OT Start Time 1315  -AN      OT Received On 04/16/23  -AN      OT Goal Re-Cert Due Date 04/26/23  -AN         Timed Charges    60452 - OT Self Care/Mgmt Minutes 10  -AN         Untimed Charges    OT Eval/Re-eval Minutes 46  -AN         Total Minutes    Timed Charges Total Minutes 10  -AN      Untimed Charges Total Minutes 46  -AN       Total Minutes 56  -AN            User Key  (r) = Recorded By, (t) = Taken By, (c) = Cosigned By    Initials Name Provider Type    AN Armida Corona OT Occupational Therapist              Therapy Charges for Today     Code Description Service Date Service Provider Modifiers Qty    [id number]  OT SELF CARE/MGMT/TRAIN EA 15 MIN 4/16/2023 Amrida Corona OT GO 1    [id number]  OT EVAL LOW COMPLEXITY 4 4/16/2023 Armida Corona OT GO 1               Armida Corona OT  4/16/2023    Electronically signed by Armida Corona OT at 04/16/23 3066

## 2023-04-17 NOTE — CONSULTS
Order noted for diabetes education per stroke protocol. A1c 5.3%. No history of diabetes noted per chart review. Does not qualify for OP stroke/diabetes class. Thank you.

## 2023-04-17 NOTE — CASE MANAGEMENT/SOCIAL WORK
Discharge Planning Assessment  [facility]     Patient Name: Yared Mckeon  MRN: [id number]  Today's Date: 4/17/2023    Admit Date: 4/15/2023    Plan: Home   Discharge Needs Assessment     Row Name 04/17/23 1052       Living Environment    People in Home alone    Current Living Arrangements home    Potentially Unsafe Housing Conditions unable to assess    Primary Care Provided by self    Provides Primary Care For no one    Family Caregiver if Needed sibling(s)    Family Caregiver Names Amber, sister    Quality of Family Relationships unable to assess    Able to Return to Prior Arrangements yes       Resource/Environmental Concerns    Resource/Environmental Concerns none    Transportation Concerns none       Transition Planning    Patient/Family Anticipates Transition to home with family    Patient/Family Anticipated Services at Transition none    Transportation Anticipated family or friend will provide       Discharge Needs Assessment    Equipment Currently Used at Home none    Concerns to be Addressed denies needs/concerns at this time;no discharge needs identified    Anticipated Changes Related to Illness none    Outpatient/Agency/Support Group Needs outpatient therapy               Discharge Plan     Row Name 04/17/23 1053       Plan    Plan Home    Patient/Family in Agreement with Plan yes    Plan Comments Spoke with patient at bedside to initiate discharge planning.  Patient reports living in [facility]; PCP is Tiera Tan; primary insurance is [facility] and [facility] [employer]  Patient states he is independent with ADLs and mobility; he denies DME and HH use.  Patient has family that will transport at discharge.  Patient is agreeable with therapy recommendations for OP PT and requests referral to [facility]; order and referral faxed to [phone].  No other needs noted.    Final Discharge Disposition Code 01 - home or self-care              Continued Care and  Services - Admitted Since 4/15/2023    Coordination has not been started for this encounter.       Expected Discharge Date and Time     Expected Discharge Date Expected Discharge Time    Apr 17, 2023          Demographic Summary     Row Name 04/17/23 1051       General Information    Arrived From home    Referral Source admission list    Reason for Consult discharge planning    Preferred Language English       Contact Information    Permission Granted to Share Info With                Functional Status     Row Name 04/17/23 1052       Functional Status    Usual Activity Tolerance good    Current Activity Tolerance moderate       Functional Status, IADL    Medications independent    Meal Preparation independent    Housekeeping independent    Laundry independent    Shopping independent               Psychosocial    No documentation.                Abuse/Neglect    No documentation.                Legal    No documentation.                Substance Abuse    No documentation.                Patient Forms    No documentation.                   Verona Bowen RN

## 2023-04-18 NOTE — OUTREACH NOTE
Prep Survey    Flowsheet Row Responses   [facility] facility patient discharged from? [facility]   Is LACE score < 7 ? No   Eligibility Readm Mgmt   Discharge diagnosis Unresponsive episode    Does the patient have one of the following disease processes/diagnoses(primary or secondary)? Other   Does the patient have Home health ordered? No   Is there a DME ordered? No   Prep survey completed? Yes          DOYLE DC - Registered Nurse

## 2023-04-19 LAB — VIT B1 BLD-SCNC: 107.4 NMOL/L (ref 66.5–200)

## 2023-04-20 ENCOUNTER — DOCUMENTATION (OUTPATIENT)
Dept: PULMONOLOGY | Facility: CLINIC | Age: 73
End: 2023-04-20
Payer: MEDICARE

## 2023-04-20 ENCOUNTER — READMISSION MANAGEMENT (OUTPATIENT)
Dept: CALL CENTER | Facility: HOSPITAL | Age: 73
End: 2023-04-20
Payer: MEDICARE

## 2023-04-20 NOTE — OUTREACH NOTE
Medical Week 1 Survey    Flowsheet Row Responses   [facility] patient discharged from? [facility]   Does the patient have one of the following disease processes/diagnoses(primary or secondary)? Other   Week 1 attempt successful? Yes   Call start time 1335   Call end time 1336   Discharge diagnosis Unresponsive episode    Meds reviewed with patient/caregiver? Yes   Is the patient having any side effects they believe may be caused by any medication additions or changes? No   Does the patient have all medications ordered at discharge? Yes   Is the patient taking all medications as directed (includes completed medication regime)? Yes   Does the patient have a primary care provider?  Yes   Does the patient have an appointment with their PCP within 7 days of discharge? Yes   Comments regarding PCP next week   Has the patient kept scheduled appointments due by today? N/A   Has home health visited the patient within 72 hours of discharge? N/A   Psychosocial issues? No   Did the patient receive a copy of their discharge instructions? Yes   Nursing interventions Reviewed instructions with patient   What is the patient's perception of their health status since discharge? Improving   Is the patient/caregiver able to teach back signs and symptoms related to disease process for when to call PCP? Yes   Is the patient/caregiver able to teach back signs and symptoms related to disease process for when to call 911? Yes   Is the patient/caregiver able to teach back the hierarchy of who to call/visit for symptoms/problems? PCP, Specialist, Home health nurse, Urgent Care, ED, 911 Yes   Week 1 call completed? Yes   Wrap up additional comments Pt reports he is improving. Pt reports he was happy with his care.           CESAR DUBON - Registered Nurse

## 2023-04-20 NOTE — PROGRESS NOTES
Attempted to call patient in regards to CT Chest results.  There was no answer, and I was unable to leave a voicemail for call back.  Pleural scarring is noted to be stable on CT chest.  We will follow up in one year.

## 2023-05-04 ENCOUNTER — OFFICE VISIT (OUTPATIENT)
Dept: CARDIOLOGY | Facility: CLINIC | Age: 73
End: 2023-05-04
Payer: MEDICARE

## 2023-05-04 DIAGNOSIS — I10 PRIMARY HYPERTENSION: Chronic | ICD-10-CM

## 2023-05-04 DIAGNOSIS — E78.2 MIXED HYPERLIPIDEMIA: Chronic | ICD-10-CM

## 2023-05-04 DIAGNOSIS — I50.22 CHRONIC SYSTOLIC CONGESTIVE HEART FAILURE: Chronic | ICD-10-CM

## 2023-05-04 DIAGNOSIS — I48.0 PAROXYSMAL ATRIAL FIBRILLATION: Primary | Chronic | ICD-10-CM

## 2023-05-04 PROBLEM — E78.5 HLD (HYPERLIPIDEMIA): Chronic | Status: ACTIVE | Noted: 2023-04-15

## 2023-05-04 RX ORDER — TRAMADOL HYDROCHLORIDE 50 MG/1
50 TABLET ORAL AS NEEDED
COMMUNITY
Start: 2023-04-25

## 2023-05-05 VITALS
HEART RATE: 54 BPM | SYSTOLIC BLOOD PRESSURE: 125 MMHG | BODY MASS INDEX: 24.33 KG/M2 | HEIGHT: 67 IN | OXYGEN SATURATION: 100 % | DIASTOLIC BLOOD PRESSURE: 76 MMHG | WEIGHT: 155 LBS

## 2023-05-05 PROBLEM — I50.22 CHRONIC SYSTOLIC CONGESTIVE HEART FAILURE: Chronic | Status: ACTIVE | Noted: 2019-07-12

## 2024-01-18 ENCOUNTER — APPOINTMENT (OUTPATIENT)
Dept: CT IMAGING | Facility: HOSPITAL | Age: 74
End: 2024-01-18
Payer: MEDICARE

## 2024-01-18 ENCOUNTER — APPOINTMENT (OUTPATIENT)
Dept: ULTRASOUND IMAGING | Facility: HOSPITAL | Age: 74
End: 2024-01-18
Payer: MEDICARE

## 2024-01-18 ENCOUNTER — APPOINTMENT (OUTPATIENT)
Dept: GENERAL RADIOLOGY | Facility: HOSPITAL | Age: 74
End: 2024-01-18
Payer: MEDICARE

## 2024-01-18 ENCOUNTER — TRANSCRIBE ORDERS (OUTPATIENT)
Dept: ADMINISTRATIVE | Facility: HOSPITAL | Age: 74
End: 2024-01-18
Payer: MEDICARE

## 2024-01-18 ENCOUNTER — HOSPITAL ENCOUNTER (EMERGENCY)
Facility: HOSPITAL | Age: 74
Discharge: HOME | End: 2024-01-18
Attending: EMERGENCY MEDICINE | Admitting: EMERGENCY MEDICINE
Payer: MEDICARE

## 2024-01-18 VITALS
WEIGHT: 151 LBS | RESPIRATION RATE: 18 BRPM | SYSTOLIC BLOOD PRESSURE: 138 MMHG | HEART RATE: 47 BPM | OXYGEN SATURATION: 100 % | BODY MASS INDEX: 23.7 KG/M2 | TEMPERATURE: 99.1 F | HEIGHT: 67 IN | DIASTOLIC BLOOD PRESSURE: 81 MMHG

## 2024-01-18 DIAGNOSIS — R07.9 CHEST PAIN, UNSPECIFIED TYPE: Primary | ICD-10-CM

## 2024-01-18 DIAGNOSIS — K82.9 GALLBLADDER DISEASE: ICD-10-CM

## 2024-01-18 LAB
ALBUMIN SERPL-MCNC: 3.3 G/DL (ref 3.5–5.2)
ALBUMIN/GLOB SERPL: 1.1 G/DL
ALP SERPL-CCNC: 76 U/L (ref 39–117)
ALT SERPL W P-5'-P-CCNC: 22 U/L (ref 1–41)
AMYLASE SERPL-CCNC: 81 U/L (ref 28–100)
ANION GAP SERPL CALCULATED.3IONS-SCNC: 10.9 MMOL/L (ref 5–15)
APTT PPP: 34.7 SECONDS (ref 26.5–34.5)
AST SERPL-CCNC: 22 U/L (ref 1–40)
BASOPHILS # BLD AUTO: 0.08 10*3/MM3 (ref 0–0.2)
BASOPHILS NFR BLD AUTO: 1 % (ref 0–1.5)
BILIRUB SERPL-MCNC: 0.7 MG/DL (ref 0–1.2)
BUN SERPL-MCNC: 10 MG/DL (ref 8–23)
BUN/CREAT SERPL: 16.1 (ref 7–25)
CALCIUM SPEC-SCNC: 8.6 MG/DL (ref 8.6–10.5)
CHLORIDE SERPL-SCNC: 100 MMOL/L (ref 98–107)
CO2 SERPL-SCNC: 27.1 MMOL/L (ref 22–29)
CREAT SERPL-MCNC: 0.62 MG/DL (ref 0.76–1.27)
CRP SERPL-MCNC: 2.78 MG/DL (ref 0–0.5)
D DIMER PPP FEU-MCNC: 1.56 MCGFEU/ML (ref 0–0.73)
D-LACTATE SERPL-SCNC: 1.6 MMOL/L (ref 0.5–2)
DEPRECATED RDW RBC AUTO: 51.4 FL (ref 37–54)
EGFRCR SERPLBLD CKD-EPI 2021: 100.9 ML/MIN/1.73
EOSINOPHIL # BLD AUTO: 0.03 10*3/MM3 (ref 0–0.4)
EOSINOPHIL NFR BLD AUTO: 0.4 % (ref 0.3–6.2)
ERYTHROCYTE [DISTWIDTH] IN BLOOD BY AUTOMATED COUNT: 14.5 % (ref 12.3–15.4)
ERYTHROCYTE [SEDIMENTATION RATE] IN BLOOD: 49 MM/HR (ref 0–20)
FLUAV RNA RESP QL NAA+PROBE: NOT DETECTED
FLUBV RNA RESP QL NAA+PROBE: NOT DETECTED
GEN 5 2HR TROPONIN T REFLEX: 11 NG/L
GLOBULIN UR ELPH-MCNC: 3 GM/DL
GLUCOSE SERPL-MCNC: 124 MG/DL (ref 65–99)
HCT VFR BLD AUTO: 39.6 % (ref 37.5–51)
HGB BLD-MCNC: 13.3 G/DL (ref 13–17.7)
HOLD SPECIMEN: NORMAL
HOLD SPECIMEN: NORMAL
IMM GRANULOCYTES # BLD AUTO: 0.08 10*3/MM3 (ref 0–0.05)
IMM GRANULOCYTES NFR BLD AUTO: 1 % (ref 0–0.5)
INR PPP: 0.99 (ref 0.9–1.1)
LIPASE SERPL-CCNC: 46 U/L (ref 13–60)
LYMPHOCYTES # BLD AUTO: 0.95 10*3/MM3 (ref 0.7–3.1)
LYMPHOCYTES NFR BLD AUTO: 12 % (ref 19.6–45.3)
MAGNESIUM SERPL-MCNC: 1.7 MG/DL (ref 1.6–2.4)
MCH RBC QN AUTO: 32.2 PG (ref 26.6–33)
MCHC RBC AUTO-ENTMCNC: 33.6 G/DL (ref 31.5–35.7)
MCV RBC AUTO: 95.9 FL (ref 79–97)
MONOCYTES # BLD AUTO: 0.86 10*3/MM3 (ref 0.1–0.9)
MONOCYTES NFR BLD AUTO: 10.9 % (ref 5–12)
NEUTROPHILS NFR BLD AUTO: 5.91 10*3/MM3 (ref 1.7–7)
NEUTROPHILS NFR BLD AUTO: 74.7 % (ref 42.7–76)
NRBC BLD AUTO-RTO: 0 /100 WBC (ref 0–0.2)
NT-PROBNP SERPL-MCNC: 1175 PG/ML (ref 0–900)
PLATELET # BLD AUTO: 245 10*3/MM3 (ref 140–450)
PMV BLD AUTO: 10.7 FL (ref 6–12)
POTASSIUM SERPL-SCNC: 3.4 MMOL/L (ref 3.5–5.2)
PROCALCITONIN SERPL-MCNC: 0.05 NG/ML (ref 0–0.25)
PROT SERPL-MCNC: 6.3 G/DL (ref 6–8.5)
PROTHROMBIN TIME: 13.6 SECONDS (ref 12.1–14.7)
QT INTERVAL: 486 MS
QTC INTERVAL: 473 MS
RBC # BLD AUTO: 4.13 10*6/MM3 (ref 4.14–5.8)
SARS-COV-2 RNA RESP QL NAA+PROBE: NOT DETECTED
SODIUM SERPL-SCNC: 138 MMOL/L (ref 136–145)
TROPONIN T DELTA: 0 NG/L
TROPONIN T SERPL HS-MCNC: 11 NG/L
WBC NRBC COR # BLD AUTO: 7.91 10*3/MM3 (ref 3.4–10.8)
WHOLE BLOOD HOLD COAG: NORMAL
WHOLE BLOOD HOLD SPECIMEN: NORMAL

## 2024-01-18 PROCEDURE — 85730 THROMBOPLASTIN TIME PARTIAL: CPT | Performed by: PHYSICIAN ASSISTANT

## 2024-01-18 PROCEDURE — 83880 ASSAY OF NATRIURETIC PEPTIDE: CPT | Performed by: PHYSICIAN ASSISTANT

## 2024-01-18 PROCEDURE — 82150 ASSAY OF AMYLASE: CPT | Performed by: PHYSICIAN ASSISTANT

## 2024-01-18 PROCEDURE — 71275 CT ANGIOGRAPHY CHEST: CPT

## 2024-01-18 PROCEDURE — 80053 COMPREHEN METABOLIC PANEL: CPT | Performed by: PHYSICIAN ASSISTANT

## 2024-01-18 PROCEDURE — 85610 PROTHROMBIN TIME: CPT | Performed by: PHYSICIAN ASSISTANT

## 2024-01-18 PROCEDURE — 76705 ECHO EXAM OF ABDOMEN: CPT | Performed by: RADIOLOGY

## 2024-01-18 PROCEDURE — 84145 PROCALCITONIN (PCT): CPT | Performed by: PHYSICIAN ASSISTANT

## 2024-01-18 PROCEDURE — 36415 COLL VENOUS BLD VENIPUNCTURE: CPT

## 2024-01-18 PROCEDURE — 83735 ASSAY OF MAGNESIUM: CPT | Performed by: PHYSICIAN ASSISTANT

## 2024-01-18 PROCEDURE — 83605 ASSAY OF LACTIC ACID: CPT | Performed by: PHYSICIAN ASSISTANT

## 2024-01-18 PROCEDURE — 96374 THER/PROPH/DIAG INJ IV PUSH: CPT

## 2024-01-18 PROCEDURE — 84484 ASSAY OF TROPONIN QUANT: CPT | Performed by: PHYSICIAN ASSISTANT

## 2024-01-18 PROCEDURE — 25510000001 IOPAMIDOL PER 1 ML: Performed by: EMERGENCY MEDICINE

## 2024-01-18 PROCEDURE — 71045 X-RAY EXAM CHEST 1 VIEW: CPT

## 2024-01-18 PROCEDURE — 86140 C-REACTIVE PROTEIN: CPT | Performed by: PHYSICIAN ASSISTANT

## 2024-01-18 PROCEDURE — 71045 X-RAY EXAM CHEST 1 VIEW: CPT | Performed by: RADIOLOGY

## 2024-01-18 PROCEDURE — 76705 ECHO EXAM OF ABDOMEN: CPT

## 2024-01-18 PROCEDURE — 93005 ELECTROCARDIOGRAM TRACING: CPT | Performed by: PHYSICIAN ASSISTANT

## 2024-01-18 PROCEDURE — 85652 RBC SED RATE AUTOMATED: CPT | Performed by: PHYSICIAN ASSISTANT

## 2024-01-18 PROCEDURE — 85379 FIBRIN DEGRADATION QUANT: CPT | Performed by: PHYSICIAN ASSISTANT

## 2024-01-18 PROCEDURE — 71275 CT ANGIOGRAPHY CHEST: CPT | Performed by: RADIOLOGY

## 2024-01-18 PROCEDURE — 83690 ASSAY OF LIPASE: CPT | Performed by: PHYSICIAN ASSISTANT

## 2024-01-18 PROCEDURE — 87040 BLOOD CULTURE FOR BACTERIA: CPT | Performed by: PHYSICIAN ASSISTANT

## 2024-01-18 PROCEDURE — 99285 EMERGENCY DEPT VISIT HI MDM: CPT

## 2024-01-18 PROCEDURE — 87636 SARSCOV2 & INF A&B AMP PRB: CPT | Performed by: PHYSICIAN ASSISTANT

## 2024-01-18 PROCEDURE — 93010 ELECTROCARDIOGRAM REPORT: CPT | Performed by: SPECIALIST

## 2024-01-18 PROCEDURE — 85025 COMPLETE CBC W/AUTO DIFF WBC: CPT | Performed by: PHYSICIAN ASSISTANT

## 2024-01-18 RX ORDER — POTASSIUM CHLORIDE 20 MEQ/1
40 TABLET, EXTENDED RELEASE ORAL ONCE
Status: COMPLETED | OUTPATIENT
Start: 2024-01-18 | End: 2024-01-18

## 2024-01-18 RX ORDER — FAMOTIDINE 10 MG/ML
20 INJECTION, SOLUTION INTRAVENOUS ONCE
Status: COMPLETED | OUTPATIENT
Start: 2024-01-18 | End: 2024-01-18

## 2024-01-18 RX ORDER — ALUMINA, MAGNESIA, AND SIMETHICONE 2400; 2400; 240 MG/30ML; MG/30ML; MG/30ML
15 SUSPENSION ORAL ONCE
Status: COMPLETED | OUTPATIENT
Start: 2024-01-18 | End: 2024-01-18

## 2024-01-18 RX ORDER — SODIUM CHLORIDE 0.9 % (FLUSH) 0.9 %
10 SYRINGE (ML) INJECTION AS NEEDED
Status: DISCONTINUED | OUTPATIENT
Start: 2024-01-18 | End: 2024-01-18 | Stop reason: HOSPADM

## 2024-01-18 RX ADMIN — POTASSIUM CHLORIDE 40 MEQ: 1500 TABLET, EXTENDED RELEASE ORAL at 10:15

## 2024-01-18 RX ADMIN — IOPAMIDOL 70 ML: 755 INJECTION, SOLUTION INTRAVENOUS at 11:05

## 2024-01-18 RX ADMIN — ALUMINUM HYDROXIDE, MAGNESIUM HYDROXIDE, AND DIMETHICONE 15 ML: 400; 400; 40 SUSPENSION ORAL at 13:20

## 2024-01-18 RX ADMIN — FAMOTIDINE 20 MG: 10 INJECTION, SOLUTION INTRAVENOUS at 13:20

## 2024-01-18 NOTE — DISCHARGE INSTRUCTIONS
You are being scheduled for an outpatient stress test.  Scheduling will call you with the time and date for your test.  Follow-up with your heart doctor in the office after having your stress test.  There were some abnormalities today with your gallbladder.  This may be the cause of your pain.  Please follow-up with the general surgery office at the next available appointment to be seen for this.  Stick to a bland, low-fat diet until you follow-up.  You can also follow-up with your family doctor in the office at the next available appointment.  Return to the ED at any time if symptoms change or worsen.
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
## 2024-01-23 ENCOUNTER — OFFICE VISIT (OUTPATIENT)
Dept: SURGERY | Facility: CLINIC | Age: 74
End: 2024-01-23
Payer: MEDICARE

## 2024-01-23 VITALS
WEIGHT: 154 LBS | DIASTOLIC BLOOD PRESSURE: 70 MMHG | SYSTOLIC BLOOD PRESSURE: 120 MMHG | BODY MASS INDEX: 24.17 KG/M2 | HEIGHT: 67 IN

## 2024-01-23 DIAGNOSIS — K82.8 THICKENING OF WALL OF GALLBLADDER WITH PERICHOLECYSTIC FLUID: Primary | ICD-10-CM

## 2024-01-23 LAB
BACTERIA SPEC AEROBE CULT: NORMAL
BACTERIA SPEC AEROBE CULT: NORMAL

## 2024-01-23 PROCEDURE — 99214 OFFICE O/P EST MOD 30 MIN: CPT

## 2024-01-23 PROCEDURE — 1160F RVW MEDS BY RX/DR IN RCRD: CPT

## 2024-01-23 PROCEDURE — 3074F SYST BP LT 130 MM HG: CPT

## 2024-01-23 PROCEDURE — 1159F MED LIST DOCD IN RCRD: CPT

## 2024-01-23 PROCEDURE — 3078F DIAST BP <80 MM HG: CPT

## 2024-01-23 RX ORDER — SODIUM CHLORIDE 0.9 % (FLUSH) 0.9 %
10 SYRINGE (ML) INJECTION AS NEEDED
OUTPATIENT
Start: 2024-01-23

## 2024-01-23 RX ORDER — SODIUM CHLORIDE 0.9 % (FLUSH) 0.9 %
10 SYRINGE (ML) INJECTION EVERY 12 HOURS SCHEDULED
OUTPATIENT
Start: 2024-01-23

## 2024-01-23 RX ORDER — AMOXICILLIN AND CLAVULANATE POTASSIUM 875; 125 MG/1; MG/1
1 TABLET, FILM COATED ORAL 2 TIMES DAILY
Qty: 20 TABLET | Refills: 0 | Status: SHIPPED | OUTPATIENT
Start: 2024-01-23 | End: 2024-02-02

## 2024-01-23 RX ORDER — SODIUM CHLORIDE 9 MG/ML
40 INJECTION, SOLUTION INTRAVENOUS AS NEEDED
OUTPATIENT
Start: 2024-01-23

## 2024-01-23 RX ORDER — ONDANSETRON 4 MG/1
4 TABLET, FILM COATED ORAL EVERY 8 HOURS PRN
Qty: 30 TABLET | Refills: 1 | Status: SHIPPED | OUTPATIENT
Start: 2024-01-23 | End: 2025-01-22
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
## 2024-01-24 ENCOUNTER — OFFICE VISIT (OUTPATIENT)
Dept: CARDIOLOGY | Facility: CLINIC | Age: 74
End: 2024-01-24
Payer: MEDICARE

## 2024-01-24 VITALS
SYSTOLIC BLOOD PRESSURE: 155 MMHG | HEART RATE: 56 BPM | DIASTOLIC BLOOD PRESSURE: 78 MMHG | HEIGHT: 67 IN | BODY MASS INDEX: 24.17 KG/M2 | OXYGEN SATURATION: 95 % | WEIGHT: 154 LBS

## 2024-01-24 DIAGNOSIS — I50.22 CHRONIC SYSTOLIC CONGESTIVE HEART FAILURE: Primary | Chronic | ICD-10-CM

## 2024-01-24 DIAGNOSIS — I48.0 PAROXYSMAL ATRIAL FIBRILLATION: Chronic | ICD-10-CM

## 2024-01-24 DIAGNOSIS — E78.2 MIXED HYPERLIPIDEMIA: Chronic | ICD-10-CM

## 2024-01-24 DIAGNOSIS — I10 PRIMARY HYPERTENSION: Chronic | ICD-10-CM

## 2024-01-24 PROBLEM — Z72.0 TOBACCO ABUSE: Chronic | Status: ACTIVE | Noted: 2023-04-16

## 2024-01-24 PROCEDURE — 1160F RVW MEDS BY RX/DR IN RCRD: CPT | Performed by: NURSE PRACTITIONER

## 2024-01-24 PROCEDURE — 99214 OFFICE O/P EST MOD 30 MIN: CPT | Performed by: NURSE PRACTITIONER

## 2024-01-24 PROCEDURE — 3078F DIAST BP <80 MM HG: CPT | Performed by: NURSE PRACTITIONER

## 2024-01-24 PROCEDURE — 1159F MED LIST DOCD IN RCRD: CPT | Performed by: NURSE PRACTITIONER

## 2024-01-24 PROCEDURE — 3077F SYST BP >= 140 MM HG: CPT | Performed by: NURSE PRACTITIONER

## 2024-01-24 PROCEDURE — 93000 ELECTROCARDIOGRAM COMPLETE: CPT | Performed by: NURSE PRACTITIONER
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
## 2024-01-24 NOTE — PATIENT INSTRUCTIONS
Please stop your Eliquis before your surgery.  Your last dose will be the Sunday January 28 evening dose.

## 2024-01-29 ENCOUNTER — HOSPITAL ENCOUNTER (OUTPATIENT)
Dept: NUCLEAR MEDICINE | Facility: HOSPITAL | Age: 74
Discharge: HOME | End: 2024-01-29
Payer: MEDICARE

## 2024-01-29 ENCOUNTER — HOSPITAL ENCOUNTER (OUTPATIENT)
Dept: CARDIOLOGY | Facility: HOSPITAL | Age: 74
Discharge: HOME | End: 2024-01-29
Payer: MEDICARE

## 2024-01-29 DIAGNOSIS — R07.9 CHEST PAIN, UNSPECIFIED TYPE: ICD-10-CM

## 2024-01-29 LAB
BH CV NUCLEAR PRIOR STUDY: 3
BH CV REST NUCLEAR ISOTOPE DOSE: 10.6 MCI
BH CV STRESS BP STAGE 1: NORMAL
BH CV STRESS COMMENTS STAGE 1: NORMAL
BH CV STRESS DOSE REGADENOSON STAGE 1: 0.4
BH CV STRESS DURATION MIN STAGE 1: 3
BH CV STRESS DURATION SEC STAGE 1: 0
BH CV STRESS GRADE STAGE 1: 10
BH CV STRESS HR STAGE 1: 67
BH CV STRESS METS STAGE 1: 5
BH CV STRESS NUCLEAR ISOTOPE DOSE: 30.4 MCI
BH CV STRESS PROTOCOL 1: NORMAL
BH CV STRESS PROTOCOL 2 BP STAGE 1: NORMAL
BH CV STRESS PROTOCOL 2 COMMENTS STAGE 1: NORMAL
BH CV STRESS PROTOCOL 2 DOSE REGADENOSON STAGE 1: 0.4
BH CV STRESS PROTOCOL 2 DURATION MIN STAGE 1: 0
BH CV STRESS PROTOCOL 2 DURATION SEC STAGE 1: 10
BH CV STRESS PROTOCOL 2 HR STAGE 1: 48
BH CV STRESS PROTOCOL 2 STAGE 1: 1
BH CV STRESS PROTOCOL 2: NORMAL
BH CV STRESS RECOVERY BP: NORMAL MMHG
BH CV STRESS RECOVERY HR: 85 BPM
BH CV STRESS SPEED STAGE 1: 1.7
BH CV STRESS STAGE 1: 1
LV EF NUC BP: 56 %
MAXIMAL PREDICTED HEART RATE: 147 BPM
PERCENT MAX PREDICTED HR: 45.58 %
STRESS BASELINE BP: NORMAL MMHG
STRESS BASELINE HR: 49 BPM
STRESS PERCENT HR: 54 %
STRESS POST ESTIMATED WORKLOAD: 1.6 METS
STRESS POST EXERCISE DUR SEC: 10 SEC
STRESS POST PEAK BP: NORMAL MMHG
STRESS POST PEAK HR: 67 BPM
STRESS TARGET HR: 125 BPM

## 2024-01-29 PROCEDURE — A9500 TC99M SESTAMIBI: HCPCS | Performed by: SPECIALIST

## 2024-01-29 PROCEDURE — 25010000002 REGADENOSON 0.4 MG/5ML SOLUTION: Performed by: SPECIALIST

## 2024-01-29 PROCEDURE — 93017 CV STRESS TEST TRACING ONLY: CPT

## 2024-01-29 PROCEDURE — 0 TECHNETIUM SESTAMIBI: Performed by: SPECIALIST

## 2024-01-29 PROCEDURE — 78452 HT MUSCLE IMAGE SPECT MULT: CPT

## 2024-01-29 PROCEDURE — 78452 HT MUSCLE IMAGE SPECT MULT: CPT | Performed by: SPECIALIST

## 2024-01-29 PROCEDURE — 93018 CV STRESS TEST I&R ONLY: CPT | Performed by: SPECIALIST

## 2024-01-29 RX ORDER — REGADENOSON 0.08 MG/ML
0.4 INJECTION, SOLUTION INTRAVENOUS
Status: COMPLETED | OUTPATIENT
Start: 2024-01-29 | End: 2024-01-29

## 2024-01-29 RX ADMIN — TECHNETIUM TC 99M SESTAMIBI 1 DOSE: 1 INJECTION INTRAVENOUS at 09:16

## 2024-01-29 RX ADMIN — TECHNETIUM TC 99M SESTAMIBI 1 DOSE: 1 INJECTION INTRAVENOUS at 07:55

## 2024-01-29 RX ADMIN — REGADENOSON 0.4 MG: 0.08 INJECTION, SOLUTION INTRAVENOUS at 09:16

## 2024-01-30 PROBLEM — K82.8 THICKENING OF WALL OF GALLBLADDER WITH PERICHOLECYSTIC FLUID: Status: ACTIVE | Noted: 2024-01-23

## 2024-01-31 ENCOUNTER — ANESTHESIA EVENT (OUTPATIENT)
Dept: PERIOP | Facility: HOSPITAL | Age: 74
End: 2024-01-31
Payer: MEDICARE

## 2024-01-31 ENCOUNTER — APPOINTMENT (OUTPATIENT)
Dept: GENERAL RADIOLOGY | Facility: HOSPITAL | Age: 74
End: 2024-01-31
Payer: MEDICARE

## 2024-01-31 ENCOUNTER — HOSPITAL ENCOUNTER (OUTPATIENT)
Facility: HOSPITAL | Age: 74
Setting detail: HOSPITAL OUTPATIENT SURGERY
Discharge: HOME | End: 2024-01-31
Attending: SURGERY | Admitting: SURGERY
Payer: MEDICARE

## 2024-01-31 ENCOUNTER — ANESTHESIA (OUTPATIENT)
Dept: PERIOP | Facility: HOSPITAL | Age: 74
End: 2024-01-31
Payer: MEDICARE

## 2024-01-31 VITALS
TEMPERATURE: 97.2 F | RESPIRATION RATE: 18 BRPM | WEIGHT: 150 LBS | HEIGHT: 67 IN | HEART RATE: 53 BPM | SYSTOLIC BLOOD PRESSURE: 137 MMHG | OXYGEN SATURATION: 96 % | DIASTOLIC BLOOD PRESSURE: 70 MMHG | BODY MASS INDEX: 23.54 KG/M2

## 2024-01-31 DIAGNOSIS — K82.8 THICKENING OF WALL OF GALLBLADDER WITH PERICHOLECYSTIC FLUID: ICD-10-CM

## 2024-01-31 PROCEDURE — 25010000002 GLYCOPYRROLATE 0.4 MG/2ML SOLUTION: Performed by: NURSE ANESTHETIST, CERTIFIED REGISTERED

## 2024-01-31 PROCEDURE — 25010000002 AMPICILLIN-SULBACTAM PER 1.5 G

## 2024-01-31 PROCEDURE — 25010000002 NEOSTIGMINE 10 MG/10ML SOLUTION: Performed by: NURSE ANESTHETIST, CERTIFIED REGISTERED

## 2024-01-31 PROCEDURE — 25010000002 DEXAMETHASONE PER 1 MG: Performed by: NURSE ANESTHETIST, CERTIFIED REGISTERED

## 2024-01-31 PROCEDURE — 25010000002 PROPOFOL 200 MG/20ML EMULSION: Performed by: NURSE ANESTHETIST, CERTIFIED REGISTERED

## 2024-01-31 PROCEDURE — 25010000002 ROPIVACAINE PER 1 MG: Performed by: NURSE ANESTHETIST, CERTIFIED REGISTERED

## 2024-01-31 PROCEDURE — 25010000002 BUPRENORPHINE PER 0.1 MG: Performed by: NURSE ANESTHETIST, CERTIFIED REGISTERED

## 2024-01-31 PROCEDURE — 47562 LAPAROSCOPIC CHOLECYSTECTOMY: CPT | Performed by: SURGERY

## 2024-01-31 PROCEDURE — 25810000003 LACTATED RINGERS PER 1000 ML: Performed by: NURSE ANESTHETIST, CERTIFIED REGISTERED

## 2024-01-31 PROCEDURE — 25810000003 LACTATED RINGERS PER 1000 ML: Performed by: ANESTHESIOLOGY

## 2024-01-31 PROCEDURE — 25010000002 FENTANYL CITRATE (PF) 50 MCG/ML SOLUTION: Performed by: NURSE ANESTHETIST, CERTIFIED REGISTERED

## 2024-01-31 PROCEDURE — 25010000002 ONDANSETRON PER 1 MG: Performed by: NURSE ANESTHETIST, CERTIFIED REGISTERED

## 2024-01-31 PROCEDURE — 88304 TISSUE EXAM BY PATHOLOGIST: CPT

## 2024-01-31 DEVICE — CLIP APPLIER
Type: IMPLANTABLE DEVICE | Site: BILE DUCT | Status: FUNCTIONAL
Brand: ENDO CLIP

## 2024-01-31 RX ORDER — MEPERIDINE HYDROCHLORIDE 25 MG/ML
12.5 INJECTION INTRAMUSCULAR; INTRAVENOUS; SUBCUTANEOUS
Status: DISCONTINUED | OUTPATIENT
Start: 2024-01-31 | End: 2024-01-31 | Stop reason: HOSPADM

## 2024-01-31 RX ORDER — SODIUM CHLORIDE, SODIUM LACTATE, POTASSIUM CHLORIDE, CALCIUM CHLORIDE 600; 310; 30; 20 MG/100ML; MG/100ML; MG/100ML; MG/100ML
INJECTION, SOLUTION INTRAVENOUS CONTINUOUS PRN
Status: DISCONTINUED | OUTPATIENT
Start: 2024-01-31 | End: 2024-01-31 | Stop reason: SURG

## 2024-01-31 RX ORDER — SODIUM CHLORIDE 0.9 % (FLUSH) 0.9 %
10 SYRINGE (ML) INJECTION EVERY 12 HOURS SCHEDULED
Status: DISCONTINUED | OUTPATIENT
Start: 2024-01-31 | End: 2024-01-31 | Stop reason: HOSPADM

## 2024-01-31 RX ORDER — SODIUM CHLORIDE, SODIUM LACTATE, POTASSIUM CHLORIDE, CALCIUM CHLORIDE 600; 310; 30; 20 MG/100ML; MG/100ML; MG/100ML; MG/100ML
125 INJECTION, SOLUTION INTRAVENOUS ONCE
Status: COMPLETED | OUTPATIENT
Start: 2024-01-31 | End: 2024-01-31

## 2024-01-31 RX ORDER — SODIUM CHLORIDE 0.9 % (FLUSH) 0.9 %
10 SYRINGE (ML) INJECTION AS NEEDED
Status: DISCONTINUED | OUTPATIENT
Start: 2024-01-31 | End: 2024-01-31 | Stop reason: HOSPADM

## 2024-01-31 RX ORDER — SODIUM CHLORIDE 9 MG/ML
40 INJECTION, SOLUTION INTRAVENOUS AS NEEDED
Status: DISCONTINUED | OUTPATIENT
Start: 2024-01-31 | End: 2024-01-31 | Stop reason: HOSPADM

## 2024-01-31 RX ORDER — MIDAZOLAM HYDROCHLORIDE 1 MG/ML
0.5 INJECTION INTRAMUSCULAR; INTRAVENOUS
Status: DISCONTINUED | OUTPATIENT
Start: 2024-01-31 | End: 2024-01-31 | Stop reason: HOSPADM

## 2024-01-31 RX ORDER — NEOSTIGMINE METHYLSULFATE 1 MG/ML
INJECTION, SOLUTION INTRAVENOUS AS NEEDED
Status: DISCONTINUED | OUTPATIENT
Start: 2024-01-31 | End: 2024-01-31 | Stop reason: SURG

## 2024-01-31 RX ORDER — DEXAMETHASONE SODIUM PHOSPHATE 4 MG/ML
INJECTION, SOLUTION INTRA-ARTICULAR; INTRALESIONAL; INTRAMUSCULAR; INTRAVENOUS; SOFT TISSUE AS NEEDED
Status: DISCONTINUED | OUTPATIENT
Start: 2024-01-31 | End: 2024-01-31 | Stop reason: SURG

## 2024-01-31 RX ORDER — SODIUM CHLORIDE 9 MG/ML
INJECTION, SOLUTION INTRAVENOUS AS NEEDED
Status: DISCONTINUED | OUTPATIENT
Start: 2024-01-31 | End: 2024-01-31 | Stop reason: HOSPADM

## 2024-01-31 RX ORDER — IPRATROPIUM BROMIDE AND ALBUTEROL SULFATE 2.5; .5 MG/3ML; MG/3ML
3 SOLUTION RESPIRATORY (INHALATION) ONCE AS NEEDED
Status: DISCONTINUED | OUTPATIENT
Start: 2024-01-31 | End: 2024-01-31 | Stop reason: HOSPADM

## 2024-01-31 RX ORDER — PROPOFOL 10 MG/ML
INJECTION, EMULSION INTRAVENOUS AS NEEDED
Status: DISCONTINUED | OUTPATIENT
Start: 2024-01-31 | End: 2024-01-31 | Stop reason: SURG

## 2024-01-31 RX ORDER — ROPIVACAINE HYDROCHLORIDE 5 MG/ML
INJECTION, SOLUTION EPIDURAL; INFILTRATION; PERINEURAL AS NEEDED
Status: DISCONTINUED | OUTPATIENT
Start: 2024-01-31 | End: 2024-01-31 | Stop reason: SURG

## 2024-01-31 RX ORDER — MAGNESIUM HYDROXIDE 1200 MG/15ML
LIQUID ORAL AS NEEDED
Status: DISCONTINUED | OUTPATIENT
Start: 2024-01-31 | End: 2024-01-31 | Stop reason: HOSPADM

## 2024-01-31 RX ORDER — GLYCOPYRROLATE 0.2 MG/ML
INJECTION INTRAMUSCULAR; INTRAVENOUS AS NEEDED
Status: DISCONTINUED | OUTPATIENT
Start: 2024-01-31 | End: 2024-01-31 | Stop reason: SURG

## 2024-01-31 RX ORDER — OXYCODONE HYDROCHLORIDE AND ACETAMINOPHEN 5; 325 MG/1; MG/1
1 TABLET ORAL ONCE AS NEEDED
Status: COMPLETED | OUTPATIENT
Start: 2024-01-31 | End: 2024-01-31

## 2024-01-31 RX ORDER — FAMOTIDINE 10 MG/ML
INJECTION, SOLUTION INTRAVENOUS AS NEEDED
Status: DISCONTINUED | OUTPATIENT
Start: 2024-01-31 | End: 2024-01-31 | Stop reason: SURG

## 2024-01-31 RX ORDER — SODIUM CHLORIDE, SODIUM LACTATE, POTASSIUM CHLORIDE, CALCIUM CHLORIDE 600; 310; 30; 20 MG/100ML; MG/100ML; MG/100ML; MG/100ML
100 INJECTION, SOLUTION INTRAVENOUS ONCE AS NEEDED
Status: DISCONTINUED | OUTPATIENT
Start: 2024-01-31 | End: 2024-01-31 | Stop reason: HOSPADM

## 2024-01-31 RX ORDER — ONDANSETRON 2 MG/ML
4 INJECTION INTRAMUSCULAR; INTRAVENOUS AS NEEDED
Status: DISCONTINUED | OUTPATIENT
Start: 2024-01-31 | End: 2024-01-31 | Stop reason: HOSPADM

## 2024-01-31 RX ORDER — ROCURONIUM BROMIDE 10 MG/ML
INJECTION, SOLUTION INTRAVENOUS AS NEEDED
Status: DISCONTINUED | OUTPATIENT
Start: 2024-01-31 | End: 2024-01-31 | Stop reason: SURG

## 2024-01-31 RX ORDER — PHENYLEPHRINE HCL IN 0.9% NACL 1 MG/10 ML
SYRINGE (ML) INTRAVENOUS AS NEEDED
Status: DISCONTINUED | OUTPATIENT
Start: 2024-01-31 | End: 2024-01-31 | Stop reason: SURG

## 2024-01-31 RX ORDER — HYDROCODONE BITARTRATE AND ACETAMINOPHEN 10; 325 MG/1; MG/1
1 TABLET ORAL 4 TIMES DAILY PRN
Qty: 12 TABLET | Refills: 0 | Status: SHIPPED | OUTPATIENT
Start: 2024-01-31

## 2024-01-31 RX ORDER — FENTANYL CITRATE 50 UG/ML
INJECTION, SOLUTION INTRAMUSCULAR; INTRAVENOUS AS NEEDED
Status: DISCONTINUED | OUTPATIENT
Start: 2024-01-31 | End: 2024-01-31 | Stop reason: SURG

## 2024-01-31 RX ORDER — ONDANSETRON 2 MG/ML
INJECTION INTRAMUSCULAR; INTRAVENOUS AS NEEDED
Status: DISCONTINUED | OUTPATIENT
Start: 2024-01-31 | End: 2024-01-31 | Stop reason: SURG

## 2024-01-31 RX ORDER — BUPRENORPHINE HYDROCHLORIDE 0.32 MG/ML
INJECTION INTRAMUSCULAR; INTRAVENOUS AS NEEDED
Status: DISCONTINUED | OUTPATIENT
Start: 2024-01-31 | End: 2024-01-31 | Stop reason: SURG

## 2024-01-31 RX ORDER — FENTANYL CITRATE 50 UG/ML
50 INJECTION, SOLUTION INTRAMUSCULAR; INTRAVENOUS
Status: DISCONTINUED | OUTPATIENT
Start: 2024-01-31 | End: 2024-01-31 | Stop reason: HOSPADM

## 2024-01-31 RX ADMIN — DEXAMETHASONE SODIUM PHOSPHATE 8 MG: 4 INJECTION, SOLUTION INTRA-ARTICULAR; INTRALESIONAL; INTRAMUSCULAR; INTRAVENOUS; SOFT TISSUE at 09:10

## 2024-01-31 RX ADMIN — SODIUM CHLORIDE, POTASSIUM CHLORIDE, SODIUM LACTATE AND CALCIUM CHLORIDE: 600; 310; 30; 20 INJECTION, SOLUTION INTRAVENOUS at 08:08

## 2024-01-31 RX ADMIN — GLYCOPYRROLATE 0.6 MG: 0.2 INJECTION INTRAMUSCULAR; INTRAVENOUS at 09:57

## 2024-01-31 RX ADMIN — PROPOFOL 150 MG: 10 INJECTION, EMULSION INTRAVENOUS at 09:02

## 2024-01-31 RX ADMIN — FAMOTIDINE 20 MG: 10 INJECTION, SOLUTION INTRAVENOUS at 08:59

## 2024-01-31 RX ADMIN — ROCURONIUM BROMIDE 30 MG: 10 SOLUTION INTRAVENOUS at 09:02

## 2024-01-31 RX ADMIN — ONDANSETRON HYDROCHLORIDE 4 MG: 2 SOLUTION INTRAMUSCULAR; INTRAVENOUS at 08:59

## 2024-01-31 RX ADMIN — OXYCODONE AND ACETAMINOPHEN 1 TABLET: 5; 325 TABLET ORAL at 10:48

## 2024-01-31 RX ADMIN — SODIUM CHLORIDE, POTASSIUM CHLORIDE, SODIUM LACTATE AND CALCIUM CHLORIDE 125 ML/HR: 600; 310; 30; 20 INJECTION, SOLUTION INTRAVENOUS at 08:08

## 2024-01-31 RX ADMIN — Medication 100 MCG: at 09:15

## 2024-01-31 RX ADMIN — FENTANYL CITRATE 100 MCG: 50 INJECTION INTRAMUSCULAR; INTRAVENOUS at 08:59

## 2024-01-31 RX ADMIN — AMPICILLIN SODIUM AND SULBACTAM SODIUM 3 G: 2; 1 INJECTION, POWDER, FOR SOLUTION INTRAMUSCULAR; INTRAVENOUS at 09:05

## 2024-01-31 RX ADMIN — NEOSTIGMINE METHYLSULFATE 4 MG: 0.5 INJECTION INTRAVENOUS at 09:57

## 2024-01-31 RX ADMIN — ROPIVACAINE HYDROCHLORIDE 40 MG: 5 INJECTION, SOLUTION EPIDURAL; INFILTRATION; PERINEURAL at 09:10

## 2024-01-31 RX ADMIN — BUPRENORPHINE HYDROCHLORIDE 0.3 MG: 0.32 INJECTION INTRAMUSCULAR; INTRAVENOUS at 09:10

## 2024-01-31 NOTE — OP NOTE
Laparoscopic Cholecystectomy     Surgeon:  China Francois M.D., TAISHA    Assistant: Carmelita    Indications: This patient presents with symptomatic gallbladder disease and will undergo laparoscopic cholecystectomy.    Pre-operative Diagnosis: Gallbladder ultrasound demonstrating gallbladder thickening and pericholecystic fluid    Post-operative Diagnosis: same    Anesthesia: General with block    Procedure Details   After obtaining informed consent and with venous compression boots in place, patient was taken to the operating room and placed in the supine position. After induction of general anesthesia, antibiotic prophylaxis was administered. General endotracheal anesthesia was then administered and  the abdomen was prepped and draped in the usual sterile fashion.     An incision was made above the umbilicus and the Veress needle was inserted. Pneumoperitoneum was obtained to 15mmHg and the trocars were placed.  The camera was inserted, confirming position within the abdomen.  The patient was placed in reverse Trendelenburg and additional trocars were introduced under direct vision.    The gallbladder was identified, the fundus grasped and retracted cephalad. Adhesions were lysed and with the electrocautery where indicated, taking care not to injure any adjacent organs or viscus. The infundibulum was grasped and retracted laterally, exposing the peritoneum overlying the triangle of Calot. This was then divided and exposed in a blunt fashion. The cystic duct and cystic artery were clearly identified and dissected circumferentially.     The cystic duct was clipped proximally and divided.  The cystic artery was identified, dissected free, ligated with clips and divided as well.     The gallbladder was dissected from the liver bed in retrograde fashion with the electrocautery. The gallbladder was removed. The liver bed was irrigated and inspected. Hemostasis was achieved with the electrocautery.     Camera was switched  to the subxiphoid position, the gallbladder was placed within the Endo Catch and brought out through the umbilical port.  The umbilical fascia and subxiphoid fascia were closed.  The remaining trocars were removed and the skin was closed with a 4-0 Vicryl subcuticular stitch and a sterile dressing was applied.    Instrument, sponge, and needle counts were correct at closure and at the conclusion of the case.     Patient tolerated the procedure well, was taken to the recovery room in stable condition    Findings:  Chronically thickened gallbladder    Estimated Blood Loss: Minimal    Blood administered: None           Drains: None    Grafts and Implants: None           Total IV Fluids: Per anesthesia           Specimens: Gallbladder             Complications: None

## 2024-01-31 NOTE — ANESTHESIA PROCEDURE NOTES
Airway  Urgency: elective    Date/Time: 1/31/2024 9:03 AM  End Time:1/31/2024 9:03 AM  Airway not difficult    General Information and Staff    Patient location during procedure: OR  CRNA/CAA: Merritt Munoz CRNA    Indications and Patient Condition  Indications for airway management: airway protection    Preoxygenated: yes  MILS maintained throughout  Mask difficulty assessment: 0 - not attempted    Final Airway Details  Final airway type: endotracheal airway      Successful airway: ETT  Cuffed: yes   Successful intubation technique: direct laryngoscopy  Endotracheal tube insertion site: oral  Blade: Lucho  Blade size: 4  ETT size (mm): 7.5  Cormack-Lehane Classification: grade IIa - partial view of glottis  Placement verified by: chest auscultation, capnometry and palpation of cuff   Cuff volume (mL): 8  Measured from: lips  ETT/EBT  to lips (cm): 23  Number of attempts at approach: 1  Assessment: lips, teeth, and gum same as pre-op and atraumatic intubation

## 2024-01-31 NOTE — ANESTHESIA POSTPROCEDURE EVALUATION
Patient: Yared Mckeon    Procedure Summary       Date: 01/31/24 Room / Location: [facility] OR 01 /  [facility] OR    Anesthesia Start: 0859 Anesthesia Stop: 1007    Procedure: CHOLECYSTECTOMY LAPAROSCOPIC (Abdomen) Diagnosis:       Thickening of wall of gallbladder with pericholecystic fluid      (Thickening of wall of gallbladder with pericholecystic fluid [K82.8])    Surgeons: China Francois MD Provider: Pierre Poe MD    Anesthesia Type: general ASA Status: 3            Anesthesia Type: general    Vitals  Vitals Value Taken Time   /109 01/31/24 1008   Temp 97.4 °F (36.3 °C) 01/31/24 1008   Pulse 65 01/31/24 1015   Resp 22 01/31/24 1013   SpO2 99 % 01/31/24 1015   Vitals shown include unfiled device data.        Post Anesthesia Care and Evaluation    Patient location during evaluation: PHASE II  Patient participation: complete - patient participated  Level of consciousness: awake and alert  Pain score: 1  Pain management: adequate    Airway patency: patent  Anesthetic complications: No anesthetic complications  PONV Status: controlled  Cardiovascular status: acceptable  Respiratory status: acceptable  Hydration status: acceptable

## 2024-01-31 NOTE — ANESTHESIA PROCEDURE NOTES
"Peripheral Block      Patient reassessed immediately prior to procedure    Patient location during procedure: OR  Start time: 1/31/2024 9:05 AM  Stop time: 1/31/2024 9:10 AM  Reason for block: at surgeon's request and post-op pain management  Performed by  CRNA/CAA: Merritt Munoz CRNA  Preanesthetic Checklist  Completed: patient identified, IV checked, site marked, risks and benefits discussed, surgical consent, monitors and equipment checked, pre-op evaluation and timeout performed  Prep:  Pt Position: supine  Sterile barriers:cap, gloves, sterile barriers and mask  Prep: ChloraPrep  Patient monitoring: blood pressure monitoring, continuous pulse oximetry and EKG  Procedure    Nursing cardiac assessment comments yes: Sedation, GA, Spinal,Epidural   Performed under: general  Guidance:ultrasound guided    ULTRASOUND INTERPRETATION.  Using ultrasound guidance a 20 G (20g 4\" Stimuplex) gauge needle was placed in close proximity to the nerve, at which point, under ultrasound guidance anesthetic was injected in the area of the nerve and spread of the anesthesia was seen on ultrasound in close proximity thereto.  There were no abnormalities seen on ultrasound; a digital image was taken; and the patient tolerated the procedure with no complications. Images:still images obtained    Laterality:Bilateral  Block Type:TAP  Injection Technique:single-shot  Needle Type:short-bevel  Needle Gauge:20 G  Resistance on Injection: none          Medications  Comment:Block Injection:  Total volume divided equally between Right and Left block      Post Assessment  Injection Assessment: negative aspiration for heme, incremental injection and no paresthesia on injection  Patient Tolerance:comfortable throughout block  Complications:no  Additional Notes  The pt was in the supine position under general anesthesia    Under Ultrasound guidance, a Mars 4inch 360 degree needle was advanced with Normal Saline hydro dissection of " tissue.  The Rectus and Transversus Abdominus muscles where visualized.  The needle tip was placed between the Transversus Abdominus and rectus abdominus, local anesthetic spread was visualized in the Transversus Abdominus Plane. Injection was made incrementally with aspiration every 5 mls.  There was no  intravascular injection,  injection pressure was normal, there was no neural injection, and the procedure was completed without difficulty. The same procedure was completed for left and right sided subcostal tap blocks. Thank You.

## 2024-01-31 NOTE — ANESTHESIA PREPROCEDURE EVALUATION
Anesthesia Evaluation     Patient summary reviewed and Nursing notes reviewed   no history of anesthetic complications:   NPO Solid Status: > 8 hours  NPO Liquid Status: > 8 hours           Airway   Mallampati: II  TM distance: >3 FB  Neck ROM: full  No difficulty expected  Dental    (+) edentulous    Pulmonary     breath sounds clear to auscultation  (+) a smoker Current, Abstained day of surgery,  Cardiovascular     ECG reviewed  Rhythm: irregular  Rate: abnormal    (+) dysrhythmias Atrial Fib, CHF       Neuro/Psych- negative ROS  GI/Hepatic/Renal/Endo    (+) GERD    Musculoskeletal (-) negative ROS    Abdominal    Substance History - negative use     OB/GYN negative ob/gyn ROS         Other - negative ROS                         Anesthesia Plan    ASA 3     general   total IV anesthesia  intravenous induction     Anesthetic plan, risks, benefits, and alternatives have been provided, discussed and informed consent has been obtained with: patient.    Plan discussed with CRNA.

## 2024-02-02 LAB — REF LAB TEST METHOD: NORMAL

## 2024-02-08 ENCOUNTER — OFFICE VISIT (OUTPATIENT)
Dept: SURGERY | Facility: CLINIC | Age: 74
End: 2024-02-08
Payer: MEDICARE

## 2024-02-08 VITALS
WEIGHT: 153 LBS | BODY MASS INDEX: 24.01 KG/M2 | HEIGHT: 67 IN | DIASTOLIC BLOOD PRESSURE: 70 MMHG | SYSTOLIC BLOOD PRESSURE: 130 MMHG

## 2024-02-08 DIAGNOSIS — K82.8 THICKENING OF WALL OF GALLBLADDER WITH PERICHOLECYSTIC FLUID: Primary | ICD-10-CM

## 2024-02-08 DIAGNOSIS — Z09 POSTOP CHECK: ICD-10-CM

## 2024-02-10 NOTE — PROGRESS NOTES
Subjective   Yared Mckeon is a 73 y.o. male is here today for follow-up.    History of Present Illness  Mr. Mckeon was seen in the office today for his first postoperative visit following a laparoscopic cholecystectomy on 1/31/2024.  Patient states he is doing well.  Bowels are working.  No nausea or vomiting.  No fever or chills.  No Known Allergies      Current Outpatient Medications   Medication Sig Dispense Refill    albuterol sulfate  (90 Base) MCG/ACT inhaler Inhale 2 puffs Every 4 (Four) Hours As Needed for Wheezing. 18 g 11    apixaban (ELIQUIS) 5 MG tablet tablet Take 1 tablet by mouth Every 12 (Twelve) Hours. 60 tablet 0    aspirin 81 MG chewable tablet Chew 1 tablet Daily. 120 tablet 0    atorvastatin (LIPITOR) 80 MG tablet Take 1 tablet by mouth Every Night. 90 tablet 0    Budeson-Glycopyrrol-Formoterol (Breztri Aerosphere) 160-9-4.8 MCG/ACT aerosol inhaler Inhale 2 puffs 2 (Two) Times a Day. 10.7 g 5    ferrous gluconate (FERGON) 324 MG tablet Take 1 tablet by mouth every night at bedtime.      furosemide (Lasix) 20 MG tablet Take 1 tablet by mouth 2 (Two) Times a Day As Needed (swelling, weight gain). ONLY TAKE IF noticeably swelling or gaining 2 lbs in <24 hours      HYDROcodone-acetaminophen (Norco)  MG per tablet Take 1 tablet by mouth 4 (Four) Times a Day As Needed for Moderate Pain. 12 tablet 0    metoprolol succinate XL (TOPROL-XL) 25 MG 24 hr tablet Take 0.5 tablets by mouth Daily.      ondansetron (Zofran) 4 MG tablet Take 1 tablet by mouth Every 8 (Eight) Hours As Needed for Nausea or Vomiting. 30 tablet 1    pantoprazole (PROTONIX) 40 MG EC tablet Take 1 tablet by mouth Daily. 30 tablet 0    sacubitril-valsartan (Entresto) 24-26 MG tablet Take 1 tablet by mouth 2 (Two) Times a Day.      spironolactone (ALDACTONE) 25 MG tablet Take 1/2 tablet by mouth Daily. 30 tablet 0     No current facility-administered medications for this visit.       Objective   /70   Ht 170.2 cm  "(67.01\")   Wt 69.4 kg (153 lb)   BMI 23.96 kg/m²    Physical Exam  This is a well-developed well-nourished male in no acute distress  HEENT examination: Sclera are anicteric  Abdomen: Bowel sounds are present.  Abdomen is soft, nontender  Skin/incisions: Incision sites were inspected and demonstrate no drainage or erythema  Results/Data  Pathology results were reviewed and discussed with the patient    Procedures     Assessment & Plan   Stable course, status post laparoscopic cholecystectomy    Follow-up as needed  Level of activity and diet discussed       Discussion/Summary    BMI is within normal parameters. No other follow-up for BMI required.       Future Appointments   Date Time Provider Department Center   7/24/2024  2:00 PM Licha Valdes APRN [facility]         Please note that portions of this note were completed with a voice recognition program.  "

## 2024-06-28 ENCOUNTER — TELEPHONE (OUTPATIENT)
Dept: CARDIOLOGY | Facility: CLINIC | Age: 74
End: 2024-06-28
Payer: MEDICARE

## 2024-07-01 ENCOUNTER — APPOINTMENT (OUTPATIENT)
Dept: GENERAL RADIOLOGY | Facility: HOSPITAL | Age: 74
End: 2024-07-01
Payer: MEDICARE

## 2024-07-01 ENCOUNTER — HOSPITAL ENCOUNTER (EMERGENCY)
Facility: HOSPITAL | Age: 74
Discharge: LEFT BEFORE DISCHARGE | End: 2024-07-01
Attending: EMERGENCY MEDICINE | Admitting: EMERGENCY MEDICINE
Payer: MEDICARE

## 2024-07-01 VITALS
RESPIRATION RATE: 16 BRPM | WEIGHT: 153 LBS | SYSTOLIC BLOOD PRESSURE: 124 MMHG | TEMPERATURE: 98.1 F | DIASTOLIC BLOOD PRESSURE: 74 MMHG | BODY MASS INDEX: 24.01 KG/M2 | HEART RATE: 56 BPM | HEIGHT: 67 IN | OXYGEN SATURATION: 94 %

## 2024-07-01 DIAGNOSIS — R07.9 CHEST PAIN, UNSPECIFIED TYPE: Primary | ICD-10-CM

## 2024-07-01 LAB
ALBUMIN SERPL-MCNC: 3.6 G/DL (ref 3.5–5.2)
ALBUMIN/GLOB SERPL: 1.2 G/DL
ALP SERPL-CCNC: 72 U/L (ref 39–117)
ALT SERPL W P-5'-P-CCNC: 22 U/L (ref 1–41)
ANION GAP SERPL CALCULATED.3IONS-SCNC: 11.2 MMOL/L (ref 5–15)
AST SERPL-CCNC: 53 U/L (ref 1–40)
BASOPHILS # BLD AUTO: 0.08 10*3/MM3 (ref 0–0.2)
BASOPHILS NFR BLD AUTO: 1.1 % (ref 0–1.5)
BILIRUB SERPL-MCNC: 0.7 MG/DL (ref 0–1.2)
BUN SERPL-MCNC: 13 MG/DL (ref 8–23)
BUN/CREAT SERPL: 18.8 (ref 7–25)
CALCIUM SPEC-SCNC: 8.9 MG/DL (ref 8.6–10.5)
CHLORIDE SERPL-SCNC: 107 MMOL/L (ref 98–107)
CO2 SERPL-SCNC: 23.8 MMOL/L (ref 22–29)
CREAT SERPL-MCNC: 0.69 MG/DL (ref 0.76–1.27)
DEPRECATED RDW RBC AUTO: 54.1 FL (ref 37–54)
EGFRCR SERPLBLD CKD-EPI 2021: 97.1 ML/MIN/1.73
EOSINOPHIL # BLD AUTO: 0.09 10*3/MM3 (ref 0–0.4)
EOSINOPHIL NFR BLD AUTO: 1.2 % (ref 0.3–6.2)
ERYTHROCYTE [DISTWIDTH] IN BLOOD BY AUTOMATED COUNT: 14.3 % (ref 12.3–15.4)
GLOBULIN UR ELPH-MCNC: 3 GM/DL
GLUCOSE SERPL-MCNC: 84 MG/DL (ref 65–99)
HCT VFR BLD AUTO: 42.5 % (ref 37.5–51)
HGB BLD-MCNC: 13.4 G/DL (ref 13–17.7)
HOLD SPECIMEN: NORMAL
HOLD SPECIMEN: NORMAL
IMM GRANULOCYTES # BLD AUTO: 0.03 10*3/MM3 (ref 0–0.05)
IMM GRANULOCYTES NFR BLD AUTO: 0.4 % (ref 0–0.5)
LYMPHOCYTES # BLD AUTO: 1.62 10*3/MM3 (ref 0.7–3.1)
LYMPHOCYTES NFR BLD AUTO: 21.7 % (ref 19.6–45.3)
MCH RBC QN AUTO: 32.3 PG (ref 26.6–33)
MCHC RBC AUTO-ENTMCNC: 31.5 G/DL (ref 31.5–35.7)
MCV RBC AUTO: 102.4 FL (ref 79–97)
MONOCYTES # BLD AUTO: 0.86 10*3/MM3 (ref 0.1–0.9)
MONOCYTES NFR BLD AUTO: 11.5 % (ref 5–12)
NEUTROPHILS NFR BLD AUTO: 4.78 10*3/MM3 (ref 1.7–7)
NEUTROPHILS NFR BLD AUTO: 64.1 % (ref 42.7–76)
NRBC BLD AUTO-RTO: 0 /100 WBC (ref 0–0.2)
NT-PROBNP SERPL-MCNC: 236.7 PG/ML (ref 0–900)
PLATELET # BLD AUTO: 158 10*3/MM3 (ref 140–450)
PMV BLD AUTO: 11 FL (ref 6–12)
POTASSIUM SERPL-SCNC: 3.4 MMOL/L (ref 3.5–5.2)
PROT SERPL-MCNC: 6.6 G/DL (ref 6–8.5)
QT INTERVAL: 428 MS
QTC INTERVAL: 452 MS
RBC # BLD AUTO: 4.15 10*6/MM3 (ref 4.14–5.8)
SODIUM SERPL-SCNC: 142 MMOL/L (ref 136–145)
TROPONIN T SERPL HS-MCNC: 11 NG/L
WBC NRBC COR # BLD AUTO: 7.46 10*3/MM3 (ref 3.4–10.8)
WHOLE BLOOD HOLD COAG: NORMAL
WHOLE BLOOD HOLD SPECIMEN: NORMAL

## 2024-07-01 PROCEDURE — 84484 ASSAY OF TROPONIN QUANT: CPT | Performed by: EMERGENCY MEDICINE

## 2024-07-01 PROCEDURE — 71045 X-RAY EXAM CHEST 1 VIEW: CPT | Performed by: RADIOLOGY

## 2024-07-01 PROCEDURE — 71045 X-RAY EXAM CHEST 1 VIEW: CPT

## 2024-07-01 PROCEDURE — 36415 COLL VENOUS BLD VENIPUNCTURE: CPT

## 2024-07-01 PROCEDURE — 85025 COMPLETE CBC W/AUTO DIFF WBC: CPT | Performed by: EMERGENCY MEDICINE

## 2024-07-01 PROCEDURE — 83880 ASSAY OF NATRIURETIC PEPTIDE: CPT | Performed by: PHYSICIAN ASSISTANT

## 2024-07-01 PROCEDURE — 93010 ELECTROCARDIOGRAM REPORT: CPT | Performed by: INTERNAL MEDICINE

## 2024-07-01 PROCEDURE — 93005 ELECTROCARDIOGRAM TRACING: CPT | Performed by: EMERGENCY MEDICINE

## 2024-07-01 PROCEDURE — 99284 EMERGENCY DEPT VISIT MOD MDM: CPT

## 2024-07-01 PROCEDURE — 80053 COMPREHEN METABOLIC PANEL: CPT | Performed by: EMERGENCY MEDICINE

## 2024-07-01 RX ORDER — SODIUM CHLORIDE 0.9 % (FLUSH) 0.9 %
10 SYRINGE (ML) INJECTION AS NEEDED
Status: DISCONTINUED | OUTPATIENT
Start: 2024-07-01 | End: 2024-07-02 | Stop reason: HOSPADM

## 2024-07-01 RX ORDER — ASPIRIN 81 MG/1
324 TABLET, CHEWABLE ORAL ONCE
Status: DISCONTINUED | OUTPATIENT
Start: 2024-07-01 | End: 2024-07-02 | Stop reason: HOSPADM
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
## 2024-07-01 NOTE — TELEPHONE ENCOUNTER
No Answer/Busy - Unable to leave vm- patient needs to be reschedule from 7.24.24 appt- okay for [facility] to reschedule

## 2024-07-02 LAB
QT INTERVAL: 498 MS
QTC INTERVAL: 435 MS

## 2024-07-02 NOTE — ED NOTES
Provider is at bedside.  Patient reports that he is wanting to leave AMA at this time.  Provider informed the patient the risks of leaving AMA.  Patient verbalized understanding and continues to want to leave.

## (undated) DEVICE — TROCAR: Brand: KII FIOS FIRST ENTRY

## (undated) DEVICE — SI AVANTI+ 7F STD W/GW  NO OBT: Brand: AVANTI

## (undated) DEVICE — PATIENT RETURN ELECTRODE, SINGLE-USE, CONTACT QUALITY MONITORING, ADULT, WITH 9FT CORD, FOR PATIENTS WEIGING OVER 33LBS. (15KG): Brand: MEGADYNE

## (undated) DEVICE — SET PRIMARY GRVTY 10DP MALE LL 104IN

## (undated) DEVICE — ST EXT IV SMARTSITE 2VLV SP M LL 5ML IV1

## (undated) DEVICE — CATH EP SUPRM QUADPOLAR JSN 5F 5MM 120CM

## (undated) DEVICE — LIMB HOLDER, WRIST/ANKLE: Brand: DEROYAL

## (undated) DEVICE — ELECTRD BLD EDGE/INSUL1P SFTY SLV 2.75IN

## (undated) DEVICE — CATH EP INQUIRY H CRV 7F 1-7-1MM 110CM

## (undated) DEVICE — PK LAP GEN 70

## (undated) DEVICE — ST INF PRI SMRTSTE 20DRP 2VLV 24ML 117

## (undated) DEVICE — PENCL ES MEGADINE EZ/CLEAN BUTN W/HOLSTR 10FT

## (undated) DEVICE — TEMPERATURE ABLATION CATHETER: Brand: BLAZER® II XP

## (undated) DEVICE — TROCAR: Brand: KII SLEEVE

## (undated) DEVICE — SUT MNCRYL PLS ANTIB UD 4/0 PS2 18IN

## (undated) DEVICE — ENDOPOUCH RETRIEVER SPECIMEN RETRIEVAL BAGS: Brand: ENDOPOUCH RETRIEVER

## (undated) DEVICE — MONOPOLAR METZENBAUM SCISSOR TIP, DISPOSABLE: Brand: MONOPOLAR METZENBAUM SCISSOR TIP, DISPOSABLE

## (undated) DEVICE — ENDOPATH XCEL BLADELESS TROCARS WITH STABILITY SLEEVES: Brand: ENDOPATH XCEL

## (undated) DEVICE — INSUFFLATION NEEDLE TO ESTABLISH PNEUMOPERITONEUM.: Brand: INSUFFLATION NEEDLE

## (undated) DEVICE — LEX ELECTRO PHYSIOLOGY: Brand: MEDLINE INDUSTRIES, INC.

## (undated) DEVICE — TUBING, SUCTION, 1/4" X 20', STRAIGHT: Brand: MEDLINE INDUSTRIES, INC.

## (undated) DEVICE — DECANT BG O JET

## (undated) DEVICE — 2, DISPOSABLE SUCTION/IRRIGATOR WITH DISPOSABLE TIP: Brand: STRYKEFLOW

## (undated) DEVICE — AVANTI + 5F STD W/GW: Brand: AVANTI

## (undated) DEVICE — CANN NASL CO2 DIVIDED A/

## (undated) DEVICE — 40595 XL TRENDELENBURG POSITIONING KIT: Brand: 40595 XL TRENDELENBURG POSITIONING KIT

## (undated) DEVICE — SI AVANTI+ 8F STD W/GW  NO OBT: Brand: AVANTI

## (undated) DEVICE — SOL NACL 0.9PCT 1000ML

## (undated) DEVICE — UNDYED BRAIDED (POLYGLACTIN 910), SYNTHETIC ABSORBABLE SUTURE: Brand: COATED VICRYL

## (undated) DEVICE — GLV SURG PREMIERPRO MIC LTX PF SZ7 BRN